# Patient Record
Sex: MALE | Employment: STUDENT | URBAN - METROPOLITAN AREA
[De-identification: names, ages, dates, MRNs, and addresses within clinical notes are randomized per-mention and may not be internally consistent; named-entity substitution may affect disease eponyms.]

---

## 2017-03-16 ENCOUNTER — TELEPHONE (OUTPATIENT)
Dept: INTERNAL MEDICINE CLINIC | Age: 22
End: 2017-03-16

## 2017-03-16 NOTE — TELEPHONE ENCOUNTER
Spoke to patients mother Sharee Ge (On HIPPA). Patient is out of town at [facility]. Patient has c/o chest discomfort \"like when she had pneumonia\". This started yesterday per Mom. She is also having headaches. Patient has an appt scheduled for 3/20/17. Tra Vickers that we recommend ER/UC with c/o chest discomfort/pain. Sharee Ge acknowledged that is the option if patient becomes worse stating the pain is more so in patients back. Advised er/uc for concerns.

## 2017-03-20 ENCOUNTER — OFFICE VISIT (OUTPATIENT)
Dept: INTERNAL MEDICINE CLINIC | Age: 22
End: 2017-03-20

## 2017-03-20 VITALS
OXYGEN SATURATION: 100 % | DIASTOLIC BLOOD PRESSURE: 76 MMHG | BODY MASS INDEX: 37.18 KG/M2 | HEIGHT: 69 IN | RESPIRATION RATE: 18 BRPM | TEMPERATURE: 98.5 F | WEIGHT: 251 LBS | SYSTOLIC BLOOD PRESSURE: 121 MMHG | HEART RATE: 81 BPM

## 2017-03-20 DIAGNOSIS — R05.9 COUGH: ICD-10-CM

## 2017-03-20 DIAGNOSIS — J20.9 ACUTE BRONCHITIS, UNSPECIFIED ORGANISM: Primary | ICD-10-CM

## 2017-03-20 RX ORDER — AZITHROMYCIN 250 MG/1
TABLET, FILM COATED ORAL
Qty: 6 TAB | Refills: 0 | Status: SHIPPED | OUTPATIENT
Start: 2017-03-20 | End: 2017-06-08 | Stop reason: ALTCHOICE

## 2017-03-20 RX ORDER — BENZONATATE 100 MG/1
100 CAPSULE ORAL
Qty: 21 CAP | Refills: 0 | Status: SHIPPED | OUTPATIENT
Start: 2017-03-20 | End: 2017-03-27

## 2017-03-20 NOTE — MR AVS SNAPSHOT
Visit Information Date & Time Provider Department Dept. Phone Encounter #  
 3/20/2017 10:10 AM Natalie Carrero [phone] [id number] Follow-up Instructions Return if symptoms worsen or fail to improve, for Scheduled appointment 12/20/17. Your Appointments 12/20/2017  8:00 AM  
ROUTINE CARE with MD Natalie Carrero [facility]) Appt Note: 1yr f/u; Annual physical exam, fasting labs [address] [address] [phone]  
  
   
 [address] [place] [facility] [address] Upcoming Health Maintenance Date Due  
 PAP AKA CERVICAL CYTOLOGY 2/11/2016 DTaP/Tdap/Td series (2 - Td) 9/19/2016 Allergies as of 3/20/2017  Review Complete On: 3/20/2017 By: Maribel Tristan MD  
 No Known Allergies Current Immunizations  Reviewed on 4/17/2015 Name Date Human Papillomavirus 10/23/2012, 5/29/2012, 3/27/2012 Influenza Vaccine 10/30/2013 Influenza Vaccine PF 1/9/2013 Meningococcal Vaccine 3/27/2012 TDAP Vaccine 9/19/2006 Not reviewed this visit You Were Diagnosed With   
  
 Codes Comments Acute bronchitis, unspecified organism    -  Primary ICD-10-CM: J20.9 ICD-9-CM: 466.0 Cough     ICD-10-CM: R05 ICD-9-CM: 381. 2 Vitals BP Pulse Temp Resp Height(growth percentile) Weight(growth percentile) 121/76 (BP 1 Location: Left arm, BP Patient Position: Sitting) 81 98.5 °F (36.9 °C) (Oral) 18 5' 9\" (1.753 m) 251 lb (113.9 kg) LMP SpO2 BMI OB Status Smoking Status 03/05/2017 100% 37.07 kg/m2 Having regular periods Never Smoker BMI and BSA Data Body Mass Index Body Surface Area  
 37.07 kg/m 2 2.35 m 2 Preferred Pharmacy Pharmacy Name Phone [facility] [address] [place], [place] - [address] [place] [address] Claudio [phone] Your Updated Medication List  
  
   
 This list is accurate as of: 3/20/17 10:45 AM.  Always use your most recent med list.  
  
  
  
  
 azithromycin 250 mg tablet Commonly known as:  Kerriso Aguilar Take two tablets today then one tablet daily  
  
 benzonatate 100 mg capsule Commonly known as:  TESSALON Take 1 Cap by mouth three (3) times daily as needed for Cough for up to 7 days. fexofenadine 180 mg tablet Commonly known as:  Erin Peat Take 1 Tab by mouth daily. Indications: ALLERGIC RHINITIS  
  
 VITAMIN D3 1,000 unit Cap Generic drug:  cholecalciferol Take  by mouth daily. Prescriptions Sent to Pharmacy Refills  
 benzonatate (TESSALON) 100 mg capsule 0 Sig: Take 1 Cap by mouth three (3) times daily as needed for Cough for up to 7 days. Class: Normal  
 Pharmacy: [address] Ph #: [phone] Route: Oral  
 azithromycin (ZITHROMAX Z-KEVIN) 250 mg tablet 0 Sig: Take two tablets today then one tablet daily Class: Normal  
 Pharmacy: [address] Ph #: [phone] Follow-up Instructions Return if symptoms worsen or fail to improve, for Scheduled appointment 12/20/17. To-Do List   
 03/20/2017 Imaging:  XR CHEST PA LAT Patient Instructions Bronchitis: Care Instructions Your Care Instructions Bronchitis is inflammation of the bronchial tubes, which carry air to the lungs. The tubes swell and produce mucus, or phlegm. The mucus and inflamed bronchial tubes make you cough. You may have trouble breathing. Most cases of bronchitis are caused by viruses like those that cause colds. Antibiotics usually do not help and they may be harmful. Bronchitis usually develops rapidly and lasts about 2 to 3 weeks in otherwise healthy people. Follow-up care is a key part of your treatment and safety.  Be sure to make and go to all appointments, and call your doctor if you are having problems. It's also a good idea to know your test results and keep a list of the medicines you take. How can you care for yourself at home? · Take all medicines exactly as prescribed. Call your doctor if you think you are having a problem with your medicine. · Get some extra rest. 
· Take an over-the-counter pain medicine, such as acetaminophen (Tylenol), ibuprofen (Advil, Motrin), or naproxen (Aleve) to reduce fever and relieve body aches. Read and follow all instructions on the label. · Do not take two or more pain medicines at the same time unless the doctor told you to. Many pain medicines have acetaminophen, which is Tylenol. Too much acetaminophen (Tylenol) can be harmful. · Take an over-the-counter cough medicine that contains dextromethorphan to help quiet a dry, hacking cough so that you can sleep. Avoid cough medicines that have more than one active ingredient. Read and follow all instructions on the label. · Breathe moist air from a humidifier, hot shower, or sink filled with hot water. The heat and moisture will thin mucus so you can cough it out. · Do not smoke. Smoking can make bronchitis worse. If you need help quitting, talk to your doctor about stop-smoking programs and medicines. These can increase your chances of quitting for good. When should you call for help? Call 911 anytime you think you may need emergency care. For example, call if: 
· You have severe trouble breathing. Call your doctor now or seek immediate medical care if: 
· You have new or worse trouble breathing. · You cough up dark brown or bloody mucus (sputum). · You have a new or higher fever. · You have a new rash. Watch closely for changes in your health, and be sure to contact your doctor if: 
· You cough more deeply or more often, especially if you notice more mucus or a change in the color of your mucus. · You are not getting better as expected. Where can you learn more? Go to http://william-kory.info/. Enter H333 in the search box to learn more about \"Bronchitis: Care Instructions. \" Current as of: May 23, 2016 Content Version: 11.1 © 8976-3734 Healthwise, Incorporated. Care instructions adapted under license by Aug[employer] (which disclaims liability or warranty for this information). If you have questions about a medical condition or this instruction, always ask your healthcare professional. [address] any warranty or liability for your use of this information. Introducing [facility] & HEALTH SERVICES! Dear Billy Lemus: Thank you for requesting a Squareknot account. Our records indicate that you already have an active Squareknot account. You can access your account anytime at [email or web address] Did you know that you can access your hospital and ER discharge instructions at any time in Squareknot? You can also review all of your test results from your hospital stay or ER visit. Additional Information If you have questions, please visit the Frequently Asked Questions section of the Squareknot website at [email or web address] Remember, Squareknot is NOT to be used for urgent needs. For medical emergencies, dial 911. Now available from your iPhone and Android! Please provide this summary of care documentation to your next provider. Your primary care clinician is listed as Phan Boland. If you have any questions after today's visit, please call [phone].

## 2017-03-20 NOTE — PATIENT INSTRUCTIONS
Bronchitis: Care Instructions  Your Care Instructions    Bronchitis is inflammation of the bronchial tubes, which carry air to the lungs. The tubes swell and produce mucus, or phlegm. The mucus and inflamed bronchial tubes make you cough. You may have trouble breathing. Most cases of bronchitis are caused by viruses like those that cause colds. Antibiotics usually do not help and they may be harmful. Bronchitis usually develops rapidly and lasts about 2 to 3 weeks in otherwise healthy people. Follow-up care is a key part of your treatment and safety. Be sure to make and go to all appointments, and call your doctor if you are having problems. It's also a good idea to know your test results and keep a list of the medicines you take. How can you care for yourself at home? · Take all medicines exactly as prescribed. Call your doctor if you think you are having a problem with your medicine. · Get some extra rest.  · Take an over-the-counter pain medicine, such as acetaminophen (Tylenol), ibuprofen (Advil, Motrin), or naproxen (Aleve) to reduce fever and relieve body aches. Read and follow all instructions on the label. · Do not take two or more pain medicines at the same time unless the doctor told you to. Many pain medicines have acetaminophen, which is Tylenol. Too much acetaminophen (Tylenol) can be harmful. · Take an over-the-counter cough medicine that contains dextromethorphan to help quiet a dry, hacking cough so that you can sleep. Avoid cough medicines that have more than one active ingredient. Read and follow all instructions on the label. · Breathe moist air from a humidifier, hot shower, or sink filled with hot water. The heat and moisture will thin mucus so you can cough it out. · Do not smoke. Smoking can make bronchitis worse. If you need help quitting, talk to your doctor about stop-smoking programs and medicines. These can increase your chances of quitting for good.   When should you call for help? Call 911 anytime you think you may need emergency care. For example, call if:  · You have severe trouble breathing. Call your doctor now or seek immediate medical care if:  · You have new or worse trouble breathing. · You cough up dark brown or bloody mucus (sputum). · You have a new or higher fever. · You have a new rash. Watch closely for changes in your health, and be sure to contact your doctor if:  · You cough more deeply or more often, especially if you notice more mucus or a change in the color of your mucus. · You are not getting better as expected. Where can you learn more? Go to http://william-kory.info/. Enter H333 in the search box to learn more about \"Bronchitis: Care Instructions. \"  Current as of: May 23, 2016  Content Version: 11.1  © 1889-5650 Applied BioCode, Incorporated. Care instructions adapted under license by Photonic Materials (which disclaims liability or warranty for this information). If you have questions about a medical condition or this instruction, always ask your healthcare professional. [address] any warranty or liability for your use of this information.

## 2017-03-20 NOTE — LETTER
NOTIFICATION RETURN TO WORK / SCHOOL 
 
3/20/2017 10:47 AM 
 
Ms. Jasiel [address] 89 
[id number] [employer] To Whom It May Concern: 
 
Jasiel Orosco is currently under the care of [facility]. She missed class on 3/16/17 related to illness. If there are questions or concerns please have the patient contact our office. Sincerely, Turner Dial MD

## 2017-03-20 NOTE — PROGRESS NOTES
CC:  Chief Complaint   Patient presents with    Other     discomfort in chest and back/cough    Headache     HISTORY OF PRESENT ILLNESS  Claribel Hazel is a 25 y.o. female. She complains of 2 week history of fatigue, headaches, non-productive cough worse at night time, sore throat, and mild dyspnea when moving. Then 4 days ago, had back and chest pain that felt similar to when she had pneumonia last year. Denies fevers, chills, hoarseness, sinus congestion, runny nose, sneezing, ear pains, wheezing, hemoptysis, chest pain, myalgias, abdominal pain, and diarrhea. Treatment to date: ibuprofen. Patient reports no ill contacts. She did not receive a flu vaccine. Soc Hx  Single. No children. 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 Never smoker. Is not sexually active. Homosexual.     Patient Active Problem List   Diagnosis Code    Obesity (BMI 35.0-39.9 without comorbidity) (RUSTca 75.) E66.9    Mild depression F32.0    Raynaud's phenomenon I73.00    Allergic rhinitis J30.9     Past Medical History:   Diagnosis Date    Migraines 2007    Mild depression 3/10/2015    Obesity 2007    RAD (reactive airway disease) 1702,3297    Rhinitis 2005    Allergic & Seasonal     No Known Allergies  Current Outpatient Prescriptions   Medication Sig Dispense Refill    fexofenadine (ALLEGRA) 180 mg tablet Take 1 Tab by mouth daily. Indications: ALLERGIC RHINITIS 30 Tab 3    Cholecalciferol, Vitamin D3, (VITAMIN D3) 1,000 unit Cap Take  by mouth daily. PHYSICAL EXAM  Visit Vitals    /76 (BP 1 Location: Left arm, BP Patient Position: Sitting)    Pulse 81    Temp 98.5 °F (36.9 °C) (Oral)    Resp 18    Ht 5' 9\" (1.753 m)    Wt 251 lb (113.9 kg)    LMP 03/05/2017    SpO2 100%    BMI 37.07 kg/m2       General: Obese, no distress. HEENT:  Head normocephalic/atraumatic, no scleral icterus or conjunctival injection.  Oropharynx benign. No sinus tenderness. TM's and ear canals normal bilaterally. Neck: Supple. No lymphadenopathy. Lungs:  Clear to ausculation bilaterally. Good air movement. Heart:  Regular rate and rhythm, normal S1 and S2, no murmur, gallop, or rub  Extremities: No clubbing, cyanosis, or edema. Neurological: Alert and oriented. Psychiatric: Normal mood and affect. Behavior is normal.       ASSESSMENT AND PLAN    ICD-10-CM ICD-9-CM    1. Acute bronchitis, unspecified organism J20.9 466.0 XR CHEST PA LAT      benzonatate (TESSALON) 100 mg capsule      azithromycin (ZITHROMAX Z-KEVIN) 250 mg tablet   2. Cough R05 786.2        Darius Moore was seen today for other and headache. Diagnoses and all orders for this visit:    Acute bronchitis, unspecified organism  -     XR CHEST PA LAT; Future  -     benzonatate (TESSALON) 100 mg capsule; Take 1 Cap by mouth three (3) times daily as needed for Cough for up to 7 days. -     azithromycin (ZITHROMAX Z-KEVIN) 250 mg tablet; Take two tablets today then one tablet daily    Cough    Follow-up Disposition:  Return if symptoms worsen or fail to improve, for Scheduled appointment 12/20/17. Provided patient and/or family with advanced directive information and answered pertinent questions. Encouraged patient to provide a copy of advanced directive to the office when available. I have discussed the diagnosis with the patient and the intended plan as seen in the above orders. Patient is in agreement. The patient has received an after-visit summary and questions were answered concerning future plans. I have discussed medication side effects and warnings with the patient as well.

## 2017-03-20 NOTE — PROGRESS NOTES
Reviewed record  In preparation for visit and have obtained necessary documentation. 1. Have you been to the ER, urgent care clinic since your last visit? Hospitalized since your last visit?no  2. Have you seen or consulted any other health care providers outside of the [address] [place] [address] since your last visit? Include any pap smears or colon screening. No  Patient declines information on advanced directives. Declines pap smear. Wants to wait on Tdap.

## 2017-04-04 ENCOUNTER — TELEPHONE (OUTPATIENT)
Dept: INTERNAL MEDICINE CLINIC | Age: 22
End: 2017-04-04

## 2017-04-04 DIAGNOSIS — J30.2 SEASONAL ALLERGIC RHINITIS, UNSPECIFIED ALLERGIC RHINITIS TRIGGER: Primary | ICD-10-CM

## 2017-04-04 RX ORDER — MONTELUKAST SODIUM 10 MG/1
10 TABLET ORAL DAILY
Qty: 30 TAB | Refills: 5 | Status: SHIPPED | OUTPATIENT
Start: 2017-04-04 | End: 2017-06-08 | Stop reason: ALTCHOICE

## 2017-04-04 NOTE — TELEPHONE ENCOUNTER
Pt takes Allegra every day. Pollen is high and she has been getting headaches and becoming really stuffy. Mom wants to know if she needs to come in here to get something additional to go with her Allegra or does she need to see an allergy specialist.  Pt's mom can be reached at [phone].

## 2017-04-04 NOTE — TELEPHONE ENCOUNTER
Patients mother reports patient does not do well with nasal sprays. Patient is having HA and pressure in face/nose. Patient does not feel sick just having issues with allergies. Patients mother would like something else for patient to take to help with her seasonal allergies.

## 2017-05-11 ENCOUNTER — TELEPHONE (OUTPATIENT)
Dept: INTERNAL MEDICINE CLINIC | Age: 22
End: 2017-05-11

## 2017-05-11 NOTE — TELEPHONE ENCOUNTER
Mother reports patient is c/o bad dreams and itching after using generic Singulair. Patient is having a lot of issues with allergies (seasonal) and would like alternative to Singulair. Please advise.

## 2017-05-11 NOTE — TELEPHONE ENCOUNTER
----- Message from Yumiko Cuevass sent at 5/11/2017 12:58 PM EDT -----  Regarding: Dr. Racheal Gardner  Pt called regarding another medication that she can take with her Rx Allegra the previous medication she is having side effects with that.  Pt best contact number [phone]

## 2017-05-12 NOTE — TELEPHONE ENCOUNTER
Inform patient or patient's mother that there is no substitute for Singulair that would not cause the same reaction. If Allegra alone is not helping allergies, Dr. Scot Ballesteros recommends Miss Coy Toney add a nasal spray like Flonase nasal spray. It is now sold over the counter, so she can get this from any pharmacy. The Emeka-Sinus nasal rinse, also called the [facility], is also very helpful for helping seasonal allergies.

## 2017-06-08 ENCOUNTER — OFFICE VISIT (OUTPATIENT)
Dept: INTERNAL MEDICINE CLINIC | Age: 22
End: 2017-06-08

## 2017-06-08 VITALS
WEIGHT: 252 LBS | OXYGEN SATURATION: 97 % | TEMPERATURE: 99 F | DIASTOLIC BLOOD PRESSURE: 77 MMHG | RESPIRATION RATE: 16 BRPM | SYSTOLIC BLOOD PRESSURE: 115 MMHG | HEART RATE: 88 BPM | BODY MASS INDEX: 37.33 KG/M2 | HEIGHT: 69 IN

## 2017-06-08 DIAGNOSIS — M65.9 SYNOVITIS OF FINGER: Primary | ICD-10-CM

## 2017-06-08 DIAGNOSIS — L30.9 ECZEMA, UNSPECIFIED TYPE: ICD-10-CM

## 2017-06-08 RX ORDER — IBUPROFEN 800 MG/1
800 TABLET ORAL
Qty: 15 TAB | Refills: 0 | Status: SHIPPED | OUTPATIENT
Start: 2017-06-08 | End: 2017-06-13

## 2017-06-08 NOTE — LETTER
6/19/2017 11:37 AM 
 
Ms. Merline Plumb [address] 
[id number] [employer] Dear Merline Plumb: 
 
Please find your most recent results below. Resulted Orders RA + CCP ABS Result Value Ref Range Rheumatoid factor <10.0 0.0 - 13.9 IU/mL  
 CCP Antibodies IgG/IgA 5 0 - 19 units Comment:  
                             Negative               <20 Weak positive      20 - 39 Moderate positive  40 - 59 Strong positive        >59 Narrative Performed at:  [address]  [id number] : Karen Riley MD, Phone:  [phone] RECOMMENDATIONS: 
Your blood tests for rheumatoid arthritis (RA) returned negative, meaning you do not have RA. Please call me if you have any questions: [phone] Sincerely, Chemo Bradford LPN

## 2017-06-08 NOTE — MR AVS SNAPSHOT
Visit Information Date & Time Provider Department Dept. Phone Encounter #  
 6/8/2017  9:10 AM MD Tim Oconnor [phone] [id number] Follow-up Instructions Return if symptoms worsen or fail to improve, for Scheduled appt on 12/20/17. Your Appointments 12/20/2017  8:00 AM  
ROUTINE CARE with MD Tim Oconnor [facility] Appt Note: 1yr f/u; Annual physical exam, fasting labs [address] [place] [address] [phone]  
  
   
 8 [facility] [address] Upcoming Health Maintenance Date Due  
 PAP AKA CERVICAL CYTOLOGY 2/11/2016 DTaP/Tdap/Td series (2 - Td) 9/19/2016 INFLUENZA AGE 9 TO ADULT 8/1/2017 Allergies as of 6/8/2017  Review Complete On: 6/8/2017 By: Clive Guzmán MD  
 No Known Allergies Current Immunizations  Reviewed on 4/17/2015 Name Date Human Papillomavirus 10/23/2012, 5/29/2012, 3/27/2012 Influenza Vaccine 10/30/2013 Influenza Vaccine PF 1/9/2013 Meningococcal Vaccine 3/27/2012 TDAP Vaccine 9/19/2006 Not reviewed this visit You Were Diagnosed With   
  
 Codes Comments Synovitis of finger    -  Primary ICD-10-CM: M65.9 ICD-9-CM: 727.05 Eczema, unspecified type     ICD-10-CM: L30.9 ICD-9-CM: 692.9 Vitals BP Pulse Temp Resp Height(growth percentile) Weight(growth percentile) 115/77 (BP 1 Location: Left arm, BP Patient Position: Sitting) 88 99 °F (37.2 °C) (Oral) 16 5' 9\" (1.753 m) 252 lb (114.3 kg) LMP SpO2 BMI OB Status Smoking Status 06/01/2017 97% 37.21 kg/m2 Having regular periods Never Smoker BMI and BSA Data Body Mass Index Body Surface Area  
 37.21 kg/m 2 2.36 m 2 Preferred Pharmacy Pharmacy Name Phone [facility] [address] [place], [place] - [address] [place] [address] [phone] Your Updated Medication List  
  
   
 This list is accurate as of: 6/8/17  9:17 AM.  Always use your most recent med list.  
  
  
  
  
 fexofenadine 180 mg tablet Commonly known as:  Conner [place] Take 1 Tab by mouth daily. Indications: ALLERGIC RHINITIS  
  
 ibuprofen 800 mg tablet Commonly known as:  MOTRIN Take 1 Tab by mouth every eight (8) hours as needed for Pain for up to 5 days. Take with food. VITAMIN D3 1,000 unit Cap Generic drug:  cholecalciferol Take  by mouth daily. Prescriptions Sent to Pharmacy Refills  
 ibuprofen (MOTRIN) 800 mg tablet 0 Sig: Take 1 Tab by mouth every eight (8) hours as needed for Pain for up to 5 days. Take with food. Class: Normal  
 Pharmacy: [address], [address] [place] [address] Ph #: [phone] Route: Oral  
  
We Performed the Following RA + CCP ABS [[id number] Custom] Follow-up Instructions Return if symptoms worsen or fail to improve, for Scheduled appt on 12/20/17. Introducing [facility] & HEALTH SERVICES! Dear Jimena Purchase: Thank you for requesting a Independent IP account. Our records indicate that you already have an active Independent IP account. You can access your account anytime at [email or web address] Did you know that you can access your hospital and ER discharge instructions at any time in Independent IP? You can also review all of your test results from your hospital stay or ER visit. Additional Information If you have questions, please visit the Frequently Asked Questions section of the Independent IP website at [email or web address] Remember, Independent IP is NOT to be used for urgent needs. For medical emergencies, dial 911. Now available from your iPhone and Android! Please provide this summary of care documentation to your next provider. Your primary care clinician is listed as Justin Mcnamara. If you have any questions after today's visit, please call [phone].

## 2017-06-08 NOTE — PROGRESS NOTES
CC:  Chief Complaint   Patient presents with    Other     bumps on fingers/right hand     [address] K[address] Ventura is a 25 y.o. female    She complains of bumps on her right middle finger for over a year; no itching or pain. Her mother has similar bumps. Two days ago, developed swelling and tenderness at joint of middle finger. Noticed in evening. Mild stiffness. Had never happened before. Complains of occasional left middle back pain when she takes in a deep breath; this is the area where she had pneumonia. She has mild depression, allergic rhinitis, Raynaud's phenomenon, history of pneumonia in 9/16, and obesity. Denies fevers, chills, dyspnea, CP, or arthralgias at other joints.     Soc Hx  Single. No children. Attends [employer] where she is a senior; has one more class to take in the fall before graduating. Never smoker. Is not sexually active.      Patient Active Problem List   Diagnosis Code    Obesity (BMI 35.0-39.9 without comorbidity) ([facility] E66.9    Mild depression ([facility] F32.0    Raynaud's phenomenon I73.00    Allergic rhinitis J30.9     Past Medical History:   Diagnosis Date    Migraines 2007    Mild depression ([facility] 3/10/2015    Obesity 2007    RAD (reactive airway disease) 4764,2729    Rhinitis 2005    Allergic & Seasonal     No Known Allergies  Current Outpatient Prescriptions   Medication Sig Dispense Refill    fexofenadine (ALLEGRA) 180 mg tablet Take 1 Tab by mouth daily. Indications: ALLERGIC RHINITIS 30 Tab 3    Cholecalciferol, Vitamin D3, (VITAMIN D3) 1,000 unit Cap Take  by mouth daily. PHYSICAL EXAM  Visit Vitals    /77 (BP 1 Location: Left arm, BP Patient Position: Sitting)    Pulse 88    Temp 99 °F (37.2 °C) (Oral)    Resp 16    Ht 5' 9\" (1.753 m)    Wt 252 lb (114.3 kg)    LMP 06/01/2017    SpO2 97%    BMI 37.21 kg/m2       General: Obese, no distress.   HEENT:  Head normocephalic/atraumatic, no scleral icterus  Lungs:  Clear to ausculation bilaterally. Good air movement. Heart:  Regular rate and rhythm, normal S1 and S2, no murmur, gallop, or rub  Extremities: No clubbing, cyanosis. Erythema with tenderness and no warmth at PIP joint of right 3rd finger. Two 2 mm flesh-colored papules on same finger. Neurological: Alert and oriented. Psychiatric: Normal mood and affect. Behavior is normal.       ASSESSMENT AND PLAN    ICD-10-CM ICD-9-CM    1. Synovitis of finger M65.9 727.05 ibuprofen (MOTRIN) 800 mg tablet      RA + CCP ABS   2. Eczema, unspecified type L30.9 692.9        Yancy Menjivar was seen today for other. Plan CXR if back pain persists. Diagnoses and all orders for this visit:    Synovitis of finger  -     Start ibuprofen (MOTRIN) 800 mg tablet; Take 1 Tab by mouth every eight (8) hours as needed for Pain for up to 5 days. Take with food. -     RA + CCP ABS    Eczema, unspecified type  Mild dyshidrotic eczema at right 3rd finger. Since it is causing no symptoms, will monitor for now. Follow-up Disposition:  Return if symptoms worsen or fail to improve, for Scheduled appt on 12/20/17. Provided patient and/or family with advanced directive information and answered pertinent questions. Encouraged patient to provide a copy of advanced directive to the office when available. I have discussed the diagnosis with the patient and the intended plan as seen in the above orders. Patient is in agreement. The patient has received an after-visit summary and questions were answered concerning future plans. I have discussed medication side effects and warnings with the patient as well.

## 2017-06-08 NOTE — PROGRESS NOTES
Reviewed record  In preparation for visit and have obtained necessary documentation. 1. Have you been to the ER, urgent care clinic since your last visit? Hospitalized since your last visit?no  2. Have you seen or consulted any other health care providers outside of the [facility] since your last visit? Include any pap smears or colon screening. no  Declines tdap today. Patient has been given information on advanced directives at a previous visit. Pap req reprinted.

## 2017-06-09 LAB
CCP IGA+IGG SERPL IA-ACNC: 5 UNITS (ref 0–19)
RHEUMATOID FACT SERPL-ACNC: <10 IU/ML (ref 0–13.9)

## 2017-06-19 ENCOUNTER — TELEPHONE (OUTPATIENT)
Dept: INTERNAL MEDICINE CLINIC | Age: 22
End: 2017-06-19

## 2017-06-19 DIAGNOSIS — M79.89 SWELLING OF RIGHT HAND: Primary | ICD-10-CM

## 2017-07-05 ENCOUNTER — OFFICE VISIT (OUTPATIENT)
Dept: INTERNAL MEDICINE CLINIC | Age: 22
End: 2017-07-05

## 2017-07-05 VITALS
RESPIRATION RATE: 18 BRPM | OXYGEN SATURATION: 98 % | HEART RATE: 98 BPM | HEIGHT: 69 IN | BODY MASS INDEX: 37.47 KG/M2 | DIASTOLIC BLOOD PRESSURE: 80 MMHG | WEIGHT: 253 LBS | SYSTOLIC BLOOD PRESSURE: 107 MMHG | TEMPERATURE: 98.7 F

## 2017-07-05 DIAGNOSIS — R20.0 LEFT ARM NUMBNESS: ICD-10-CM

## 2017-07-05 DIAGNOSIS — M89.8X1 SHOULDER BLADE PAIN: Primary | ICD-10-CM

## 2017-07-05 RX ORDER — CYCLOBENZAPRINE HCL 5 MG
5 TABLET ORAL
Qty: 21 TAB | Refills: 0 | Status: SHIPPED | OUTPATIENT
Start: 2017-07-05 | End: 2017-09-20 | Stop reason: ALTCHOICE

## 2017-07-05 RX ORDER — NAPROXEN 500 MG/1
500 TABLET ORAL 2 TIMES DAILY WITH MEALS
Qty: 20 TAB | Refills: 0 | Status: SHIPPED | OUTPATIENT
Start: 2017-07-05 | End: 2017-09-20 | Stop reason: ALTCHOICE

## 2017-07-05 NOTE — PROGRESS NOTES
Reviewed record  In preparation for visit and have obtained necessary documentation. 1. Have you been to the ER, urgent care clinic since your last visit? Hospitalized since your last visit?no  2. Have you seen or consulted any other health care providers outside of the [address] [place] [address] since your last visit? Include any pap smears or colon screening. No  Patient declines information on advanced directives. Has referral for pap and declines tdap.

## 2017-07-05 NOTE — PROGRESS NOTES
CC:   Chief Complaint   Patient presents with    Shoulder Pain     left shoulder follow up       [address] Kirt Ventura is a 25 y.o. female. She complains of left shoulder pain on and off in the winter; became more regular and intense in June. Pain usually ranges from 2-7/10, located at left shoulder blade; throbbing, sometimes radiates to chest, occasionally associated with numbness down left upper arm. Lifted suitcase recently that worsened pain. Worse when she sits up straight or makes sudden movements. Better with laying down or reclining. No relief with OTC ibuprofen. Denies trauma or injury at onset of symptoms. Hand bumps and fist pain, for which she was seen previously and called about, resolved. Soc Hx  Single. No children. Attends [employer] where she is a senior; has one more class to take in the fall 2017 before graduating, major is history, hopes to work in [employer] in the future. Never smoker. Is not sexually active.  Homosexual.    ROS  Constitutional: negative for fevers, chills, night sweats  ENT:   negative for sore throat, nasal congestion, ear pains, hoarseness  Respiratory:  negative for cough, hemoptysis, dyspnea,wheezing  CV:   negative for chest pain, palpitations, lower extremity edema  GI:   negative for heartburn, abd pain, nausea, vomiting, diarrhea, constipation  Genitourinary: negative for frequency, dysuria and hematuria  Integument:  negative for rash and pruritus  Musculoskel: negative for myalgias, arthralgias, back pain, muscle weakness, joint pain  Neurological:  negative for headaches, dizziness, vertigo, gait problems  Behavl/Psych: negative for feelings of anxiety, depression, mood change     Patient Active Problem List   Diagnosis Code    Obesity (BMI 35.0-39.9 without comorbidity) ([facility]) E66.9    Mild depression ([facility] F32.0    Raynaud's phenomenon I73.00    Allergic rhinitis J30.9     Past Medical History:   Diagnosis Date    Migraines 2007    [facility]    Obesity 2007    RAD (reactive airway disease) 4732,4466    Rhinitis 2005    Allergic & Seasonal     No Known Allergies  Current Outpatient Prescriptions   Medication Sig Dispense Refill    fexofenadine (ALLEGRA) 180 mg tablet Take 1 Tab by mouth daily. Indications: ALLERGIC RHINITIS 30 Tab 3    Cholecalciferol, Vitamin D3, (VITAMIN D3) 1,000 unit Cap Take  by mouth daily. PHYSICAL EXAM  Visit Vitals    /80 (BP 1 Location: Left arm, BP Patient Position: Sitting)    Pulse 98    Temp 98.7 °F (37.1 °C) (Oral)    Resp 18    Ht 5' 9\" (1.753 m)    Wt 253 lb (114.8 kg)    LMP 06/01/2017    SpO2 98%    BMI 37.36 kg/m2       General: Obese, no distress. HEENT:  Head normocephalic/atraumatic, no scleral icterus  Lungs:  Clear to ausculation bilaterally. Good air movement. Heart:  Regular rate and rhythm, normal S1 and S2, no murmur, gallop, or rub  Extremities: No clubbing, cyanosis, or edema. Tenderness along lower portion of left shoulder blade. Neurological: Alert and oriented. Psychiatric: Normal mood and affect. Behavior is normal.     Results for orders placed or performed in visit on 06/08/17   RA + CCP ABS   Result Value Ref Range    Rheumatoid factor <10.0 0.0 - 13.9 IU/mL    CCP Antibodies IgG/IgA 5 0 - 19 units         ASSESSMENT AND PLAN    ICD-10-CM ICD-9-CM    1. Shoulder blade pain M89.8X1 733.90 naproxen (NAPROSYN) 500 mg tablet      cyclobenzaprine (FLEXERIL) 5 mg tablet   2. Left arm numbness R20.0 782.0        [facility] pain. Diagnoses and all orders for this visit:    Shoulder blade pain  Most likely musculoskeletal.  -     Start naproxen (NAPROSYN) 500 mg tablet; Take 1 Tab by mouth two (2) times daily (with meals). -     Start cyclobenzaprine (FLEXERIL) 5 mg tablet; Take 1 Tab by mouth three (3) times daily as needed for Muscle Spasm(s). Muscle relaxer.     Left arm numbness      Follow-up Disposition:  Return if symptoms worsen or fail to improve, for Scheduled appt 12/20/17. Provided patient and/or family with advanced directive information and answered pertinent questions. Encouraged patient to provide a copy of advanced directive to the office when available. I have discussed the diagnosis with the patient and the intended plan as seen in the above orders. Patient is in agreement. The patient has received an after-visit summary and questions were answered concerning future plans. I have discussed medication side effects and warnings with the patient as well.

## 2017-07-05 NOTE — MR AVS SNAPSHOT
Visit Information Date & Time Provider Department Dept. Phone Encounter #  
 7/5/2017  9:30 AM Riana Fry, [facility] [phone] [id number] Follow-up Instructions Return if symptoms worsen or fail to improve, for Scheduled appt 12/20/17. Your Appointments 12/20/2017  8:00 AM  
ROUTINE CARE with Riana Fry MD  
[address] [facility]) Appt Note: 1yr f/u; Annual physical exam, fasting labs [address] [address] [phone]  
  
   
 [address] [facility] [address] Upcoming Health Maintenance Date Due  
 PAP AKA CERVICAL CYTOLOGY 2/11/2016 INFLUENZA AGE 9 TO ADULT 8/1/2017 DTaP/Tdap/Td series (3 - Td) 7/5/2027 Allergies as of 7/5/2017  Review Complete On: 7/5/2017 By: Riana Fry MD  
 No Known Allergies Current Immunizations  Reviewed on 4/17/2015 Name Date Human Papillomavirus 10/23/2012, 5/29/2012, 3/27/2012 Influenza Vaccine 10/30/2013 Influenza Vaccine PF 1/9/2013 Meningococcal Vaccine 3/27/2012 TDAP Vaccine 9/19/2006 Not reviewed this visit You Were Diagnosed With   
  
 Codes Comments Shoulder blade pain    -  Primary ICD-10-CM: H54.0I3 ICD-9-CM: 733.90 Left arm numbness     ICD-10-CM: R20.0 ICD-9-CM: 316. 0 Vitals BP Pulse Temp Resp Height(growth percentile) Weight(growth percentile) 107/80 (BP 1 Location: Left arm, BP Patient Position: Sitting) 98 98.7 °F (37.1 °C) (Oral) 18 5' 9\" (1.753 m) 253 lb (114.8 kg) LMP SpO2 BMI OB Status Smoking Status 06/01/2017 98% 37.36 kg/m2 Having regular periods Never Smoker Vitals History BMI and BSA Data Body Mass Index Body Surface Area  
 37.36 kg/m 2 2.36 m 2 Preferred Pharmacy Pharmacy Name Phone [facility] [address] [place], [place] - [address] [place] [address] [phone] Your Updated Medication List  
  
   
 This list is accurate as of: 7/5/17  9:56 AM.  Always use your most recent med list.  
  
  
  
  
 cyclobenzaprine 5 mg tablet Commonly known as:  FLEXERIL Take 1 Tab by mouth three (3) times daily as needed for Muscle Spasm(s). Muscle relaxer. fexofenadine 180 mg tablet Commonly known as:  Bertis Knights Take 1 Tab by mouth daily. Indications: ALLERGIC RHINITIS  
  
 naproxen 500 mg tablet Commonly known as:  NAPROSYN Take 1 Tab by mouth two (2) times daily (with meals). VITAMIN D3 1,000 unit Cap Generic drug:  cholecalciferol Take  by mouth daily. Prescriptions Sent to Pharmacy Refills  
 naproxen (NAPROSYN) 500 mg tablet 0 Sig: Take 1 Tab by mouth two (2) times daily (with meals). Class: Normal  
 Pharmacy: [address] [place] [address] Ph #: [phone] Route: Oral  
 cyclobenzaprine (FLEXERIL) 5 mg tablet 0 Sig: Take 1 Tab by mouth three (3) times daily as needed for Muscle Spasm(s). Muscle relaxer. Class: Normal  
 Pharmacy: [id number] [place] [address] Ph #: [phone] Route: Oral  
  
Follow-up Instructions Return if symptoms worsen or fail to improve, for Scheduled appt 12/20/17. Patient Instructions Shoulder Blade: Exercises Your Care Instructions Here are some examples of typical exercises for your condition. Start each exercise slowly. Ease off the exercise if you start to have pain. Your doctor or physical therapist will tell you when you can start these exercises and which ones will work best for you. How to do the exercises Shoulder roll 1. Stand tall with your chin slightly tucked. Imagine that a string at the top of your head is pulling you straight up. 2. Keep your arms relaxed. All motion will be in your shoulders. 3. Shrug your shoulders up toward your ears, then up and back.  [place] your shoulders down and back, like you're sliding your hands down into your back pants pockets. 4. Repeat the circles at least 2 to 4 times. This exercise is also helpful anytime you want to relax. Lower neck and upper back stretch 1. With your arms about shoulder height, clasp your hands in front of you. 2. Drop your chin toward your chest. 
3. Reach straight forward so you are rounding your upper back. Think about pulling your shoulder blades apart. Janny Nur feel a stretch across your upper back and shoulders. Hold for at least 6 seconds. 4. Repeat 2 to 4 times. Triceps stretch 1. Reach your arm straight up. 2. Keeping your elbow in place, bend your arm and reach your hand down behind your back. 3. With your other hand, apply gentle pressure to the bent elbow. Janny Nur feel a stretch at the back of your upper arm and shoulder. Hold about 6 seconds. 4. Repeat 2 to 4 times with each arm. Shoulder stretch 1. Relax your shoulders. 2. Raise one arm to shoulder height, and reach it across your chest. 
3. Pull the arm slightly toward you with your other arm. This will help you get a gentle stretch. Hold for about 6 seconds. 4. Repeat 2 to 4 times. Shoulder blade squeeze 1. Sit or stand up tall with your arms at your sides. 2. Keep your shoulders relaxed and down, not shrugged. 3. Squeeze your shoulder blades together. Hold for 6 seconds, then relax. 4. Repeat 8 to 12 times. Straight-arm shoulder blade squeeze 1. Sit or stand tall. Relax your shoulders. 2. With palms down, hold your elastic tubing or band straight out in front of you. 3. Start with slight tension in the tubing or band, with your hands about shoulder-width apart. 4. Slowly pull straight out to the sides, squeezing your shoulder blades together. Keep your arms straight and at shoulder height. Slowly release. 5. Repeat 8 to 12 times. Rowing 1. Switzer your elastic tubing or band at about waist height. Take one end in each hand. 2. Sit or stand with your feet hip-width apart. 3. Hold your arms straight in front of you. Adjust your distance to create slight tension in the tubing or band. 4. Slightly tuck your chin. Relax your shoulders. 5. Without shrugging your shoulders, pull straight back. Your elbows will pass alongside your waist. 
Pull-downs 1. Applegate your elastic tubing or band in the top of a closed door. Take one end in each hand. 2. Either sit or stand, depending on what is more comfortable. If you feel unsteady, sit on a chair. 3. Start with your arms up and comfortably apart, elbows straight. There should be a slight tension in the tubing or band. 4. Slightly tuck your chin, and look straight ahead. 5. Keeping your back straight, slowly pull down and back, bending your elbows. 6. Stop where your hands are level with your chin, in a \"goalpost\" position. 7. Repeat 8 to 12 times. Chest T stretch 1. Lie on your back. Raise your knees so they are bent. Plant your feet on the floor, hip-width apart. 2. Tuck your chin, and relax your shoulders. 3. Reach your arms straight out to the sides. If you don't feel a mild stretch in your shoulders and across your chest, use a foam roll or a tightly rolled blanket under your spine, from your tailbone to your head. 4. Relax in this position for at least 15 to 30 seconds while you breathe normally. Repeat 2 to 4 times. As you get used to this stretch, keep adding a little more time until you are able relax in this position for 2 or 3 minutes. When you can relax for at least 2 minutes, you only need to do the exercise 1 time per session. Chest goalpost stretch 1. Lie on your back. Raise your knees so they are bent. Plant your feet on the floor, hip-width apart. 2. Tuck your chin, and relax your shoulders. 3. Reach your arms straight out to the sides.  
4. Bend your arms at the elbows, with your hands pointed toward the top of your head. Your arms should make an L on either side of your head. Your palms should be facing up. 5. If you don't feel a mild stretch in your shoulders and across your chest, use a foam roll or tightly rolled blanket under your spine, from your tailbone to your head. 6. Relax in this position for at least 15 to 30 seconds while you breathe normally. Repeat 2 to 4 times. Each day you do this exercise, add a little more time until you can relax in this position for 2 or 3 minutes. When you can relax for at least 2 minutes, you only need to do the exercise 1 time per session. Follow-up care is a key part of your treatment and safety. Be sure to make and go to all appointments, and call your doctor if you are having problems. It's also a good idea to know your test results and keep a list of the medicines you take. Where can you learn more? Go to http://william-kory.info/. Enter ([phone] in the search box to learn more about \"Shoulder Blade: Exercises. \" Current as of: March 21, 2017 Content Version: 11.3 © 2675-9871 [employer]. Care instructions adapted under license by [employer] (which disclaims liability or warranty for this information). If you have questions about a medical condition or this instruction, always ask your healthcare professional. [address] any warranty or liability for your use of this information. Introducing [facility] & HEALTH SERVICES! Dear Marylin Cunningham: Thank you for requesting a [employer] account. Our records indicate that you already have an active [employer] account. You can access your account anytime at [email or web address] Did you know that you can access your hospital and ER discharge instructions at any time in [employer]? You can also review all of your test results from your hospital stay or ER visit. Additional Information If you have questions, please visit the Frequently Asked Questions section of the [employer] website at [email or web address] Remember, [employer] is NOT to be used for urgent needs. For medical emergencies, dial 911. Now available from your iPhone and Android! Please provide this summary of care documentation to your next provider. Your primary care clinician is listed as Rock Cowden. If you have any questions after today's visit, please call [phone].

## 2017-09-08 ENCOUNTER — APPOINTMENT (OUTPATIENT)
Dept: GENERAL RADIOLOGY | Age: 22
End: 2017-09-08
Attending: EMERGENCY MEDICINE
Payer: COMMERCIAL

## 2017-09-08 ENCOUNTER — APPOINTMENT (OUTPATIENT)
Dept: CT IMAGING | Age: 22
End: 2017-09-08
Attending: EMERGENCY MEDICINE
Payer: COMMERCIAL

## 2017-09-08 ENCOUNTER — HOSPITAL ENCOUNTER (EMERGENCY)
Age: 22
Discharge: HOME | End: 2017-09-08
Attending: EMERGENCY MEDICINE
Payer: COMMERCIAL

## 2017-09-08 VITALS
WEIGHT: 265.65 LBS | OXYGEN SATURATION: 99 % | HEIGHT: 69 IN | SYSTOLIC BLOOD PRESSURE: 137 MMHG | HEART RATE: 88 BPM | BODY MASS INDEX: 39.35 KG/M2 | RESPIRATION RATE: 17 BRPM | DIASTOLIC BLOOD PRESSURE: 75 MMHG | TEMPERATURE: 98.3 F

## 2017-09-08 DIAGNOSIS — R00.2 PALPITATIONS: Primary | ICD-10-CM

## 2017-09-08 LAB
ALBUMIN SERPL-MCNC: 3.4 G/DL (ref 3.5–5)
ALBUMIN/GLOB SERPL: 0.8 {RATIO} (ref 1.1–2.2)
ALP SERPL-CCNC: 101 U/L (ref 45–117)
ALT SERPL-CCNC: 26 U/L (ref 12–78)
ANION GAP SERPL CALC-SCNC: 7 MMOL/L (ref 5–15)
AST SERPL-CCNC: 14 U/L (ref 15–37)
ATRIAL RATE: 86 BPM
BASOPHILS # BLD: 0 K/UL (ref 0–0.1)
BASOPHILS NFR BLD: 0 % (ref 0–1)
BILIRUB SERPL-MCNC: 0.3 MG/DL (ref 0.2–1)
BUN SERPL-MCNC: 10 MG/DL (ref 6–20)
BUN/CREAT SERPL: 13 (ref 12–20)
CALCIUM SERPL-MCNC: 8.6 MG/DL (ref 8.5–10.1)
CALCULATED P AXIS, ECG09: 24 DEGREES
CALCULATED R AXIS, ECG10: 53 DEGREES
CALCULATED T AXIS, ECG11: 35 DEGREES
CHLORIDE SERPL-SCNC: 105 MMOL/L (ref 97–108)
CO2 SERPL-SCNC: 27 MMOL/L (ref 21–32)
CREAT SERPL-MCNC: 0.79 MG/DL (ref 0.55–1.02)
D DIMER PPP FEU-MCNC: 0.71 MG/L FEU (ref 0–0.65)
DIAGNOSIS, 93000: NORMAL
EOSINOPHIL # BLD: 0.1 K/UL (ref 0–0.4)
EOSINOPHIL NFR BLD: 2 % (ref 0–7)
ERYTHROCYTE [DISTWIDTH] IN BLOOD BY AUTOMATED COUNT: 13 % (ref 11.5–14.5)
GLOBULIN SER CALC-MCNC: 4.4 G/DL (ref 2–4)
GLUCOSE SERPL-MCNC: 93 MG/DL (ref 65–100)
HCG UR QL: NEGATIVE
HCT VFR BLD AUTO: 37.3 % (ref 35–47)
HGB BLD-MCNC: 12.7 G/DL (ref 11.5–16)
LYMPHOCYTES # BLD: 3.2 K/UL (ref 0.8–3.5)
LYMPHOCYTES NFR BLD: 37 % (ref 12–49)
MCH RBC QN AUTO: 30.8 PG (ref 26–34)
MCHC RBC AUTO-ENTMCNC: 34 G/DL (ref 30–36.5)
MCV RBC AUTO: 90.3 FL (ref 80–99)
MONOCYTES # BLD: 0.5 K/UL (ref 0–1)
MONOCYTES NFR BLD: 6 % (ref 5–13)
NEUTS SEG # BLD: 4.9 K/UL (ref 1.8–8)
NEUTS SEG NFR BLD: 55 % (ref 32–75)
P-R INTERVAL, ECG05: 144 MS
PLATELET # BLD AUTO: 332 K/UL (ref 150–400)
POTASSIUM SERPL-SCNC: 4 MMOL/L (ref 3.5–5.1)
PROT SERPL-MCNC: 7.8 G/DL (ref 6.4–8.2)
Q-T INTERVAL, ECG07: 368 MS
QRS DURATION, ECG06: 78 MS
QTC CALCULATION (BEZET), ECG08: 440 MS
RBC # BLD AUTO: 4.13 M/UL (ref 3.8–5.2)
SODIUM SERPL-SCNC: 139 MMOL/L (ref 136–145)
TROPONIN I SERPL-MCNC: <0.04 NG/ML
VENTRICULAR RATE, ECG03: 86 BPM
WBC # BLD AUTO: 8.8 K/UL (ref 3.6–11)

## 2017-09-08 PROCEDURE — 84484 ASSAY OF TROPONIN QUANT: CPT | Performed by: EMERGENCY MEDICINE

## 2017-09-08 PROCEDURE — 71275 CT ANGIOGRAPHY CHEST: CPT

## 2017-09-08 PROCEDURE — 81025 URINE PREGNANCY TEST: CPT

## 2017-09-08 PROCEDURE — 96374 THER/PROPH/DIAG INJ IV PUSH: CPT

## 2017-09-08 PROCEDURE — 36415 COLL VENOUS BLD VENIPUNCTURE: CPT | Performed by: EMERGENCY MEDICINE

## 2017-09-08 PROCEDURE — 85379 FIBRIN DEGRADATION QUANT: CPT | Performed by: EMERGENCY MEDICINE

## 2017-09-08 PROCEDURE — 99283 EMERGENCY DEPT VISIT LOW MDM: CPT

## 2017-09-08 PROCEDURE — 74011250636 HC RX REV CODE- 250/636: Performed by: EMERGENCY MEDICINE

## 2017-09-08 PROCEDURE — 71020 XR CHEST PA LAT: CPT

## 2017-09-08 PROCEDURE — 85025 COMPLETE CBC W/AUTO DIFF WBC: CPT | Performed by: EMERGENCY MEDICINE

## 2017-09-08 PROCEDURE — 80053 COMPREHEN METABOLIC PANEL: CPT | Performed by: EMERGENCY MEDICINE

## 2017-09-08 PROCEDURE — 96361 HYDRATE IV INFUSION ADD-ON: CPT

## 2017-09-08 PROCEDURE — 93005 ELECTROCARDIOGRAM TRACING: CPT

## 2017-09-08 PROCEDURE — 74011636320 HC RX REV CODE- 636/320: Performed by: EMERGENCY MEDICINE

## 2017-09-08 RX ORDER — LORAZEPAM 2 MG/ML
0.5 INJECTION INTRAMUSCULAR
Status: COMPLETED | OUTPATIENT
Start: 2017-09-08 | End: 2017-09-08

## 2017-09-08 RX ORDER — DIAZEPAM 5 MG/1
2 TABLET ORAL
Qty: 15 TAB | Refills: 0 | Status: SHIPPED | OUTPATIENT
Start: 2017-09-08 | End: 2017-09-20 | Stop reason: ALTCHOICE

## 2017-09-08 RX ORDER — SODIUM CHLORIDE 9 MG/ML
50 INJECTION, SOLUTION INTRAVENOUS
Status: COMPLETED | OUTPATIENT
Start: 2017-09-08 | End: 2017-09-08

## 2017-09-08 RX ORDER — DIAZEPAM 10 MG/2ML
2 INJECTION INTRAMUSCULAR
Status: DISCONTINUED | OUTPATIENT
Start: 2017-09-08 | End: 2017-09-08 | Stop reason: RX

## 2017-09-08 RX ORDER — SODIUM CHLORIDE 0.9 % (FLUSH) 0.9 %
10 SYRINGE (ML) INJECTION
Status: COMPLETED | OUTPATIENT
Start: 2017-09-08 | End: 2017-09-08

## 2017-09-08 RX ADMIN — SODIUM CHLORIDE 500 ML: 900 INJECTION, SOLUTION INTRAVENOUS at 09:10

## 2017-09-08 RX ADMIN — IOPAMIDOL 80 ML: 755 INJECTION, SOLUTION INTRAVENOUS at 10:59

## 2017-09-08 RX ADMIN — Medication 10 ML: at 10:59

## 2017-09-08 RX ADMIN — SODIUM CHLORIDE 50 ML/HR: 900 INJECTION, SOLUTION INTRAVENOUS at 10:59

## 2017-09-08 RX ADMIN — LORAZEPAM 0.5 MG: 2 INJECTION INTRAMUSCULAR; INTRAVENOUS at 09:07

## 2017-09-08 NOTE — ED NOTES
Patient arrived with complaints of intermittnet chest pain and shortness of breath for two months. Patient denies any other symptoms.  Call bell within reach and mother at bedside

## 2017-09-08 NOTE — Clinical Note
Thank you for allowing us to provide you with excellent care today. We hope we addressed all of your concerns and needs. We strive to provide excellent quality care in the Emergency Department. Please rate us as excellent, as anything less than exce llent does not meet our expectations. If you feel that you have not received excellent quality care or timely care, please ask to speak to the nurse manager. Please choose us in the future for your continued health care needs. The exam and treatm ent you received in the Emergency Department were for an urgent problem and are not intended as complete care. It is important that you follow-up with a doctor, nurse practitioner, or physician assistant to:  (1) confirm your diagnosis,  (2) re-evaluatio n of changes in your illness and treatment, and  (3) for ongoing care. If your symptoms become worse or you do not improve as expected and you are unable to reach your usual health care provider, you should return to the Emergency Department. We are klaus ilable 24 hours a day. Take this sheet with you when you go to your follow-up visit. If you have any problem arranging the follow-up visit, contact [address] [place] [address] 21 [phone]) Make an appointment with your Primary Care doctor for follow up of this visit. Re turn to the ER if you are unable to be seen in the time recommended on your discharge instructions.

## 2017-09-08 NOTE — ED PROVIDER NOTES
[address]  EMERGENCY DEPARTMENT HISTORY AND PHYSICAL EXAM       Date of Service: 9/8/2017   Patient Name: Alberto Bhagat   YOB: 1995  Medical Record Number: [id number]    History of Presenting Illness     Chief Complaint   Patient presents with    Chest Pain     for the past two months with intermittent SOB and palpitations. History Provided By:  patient    Additional History:   Alberto Bhagat is a 25 y.o. female with PMhx significant for migraines who presents ambulatory to the ED with cc of chest tightness x 2 months with intermittent palpitations, HA, and back pain. Pt states that she was seen recently by her PCP regarding symptoms, however, she states they are progressively worsening. She describes the pain as sharp, non-radiating, and moderate in severity. She reports use of prescription strength Ibuprofen as prescribed by her PCP without relief. She denies any exacerbating or alleviating factors. She specifically denies any fevers, chills, nausea, vomiting, shortness of breath, headache, rash, diarrhea, sweating or weight loss. Social Hx: - Tobacco, - EtOH, - Illicit Drugs    There are no other complaints, changes or physical findings at this time.     Primary Care Provider: Sofiya Church MD     Past History     Past Medical History:   Past Medical History:   Diagnosis Date    Migraines 2007    Mild depression ([facility] [address] 3/10/2015    Obesity 2007    RAD (reactive airway disease) 0365,1872    Rhinitis 2005    Allergic & Seasonal        Past Surgical History:   Past Surgical History:   Procedure Laterality Date    HX OTHER SURGICAL Right 4/2013    had piece of bone removed from right arm        Family History:   Family History   Problem Relation Age of Onset    Other Mother      MYASTHENIA GRAVIS    Hypertension Mother     Heart Disease Maternal Grandmother     Cancer Maternal Grandmother      breast cancer    Cancer Maternal Aunt      breast cancer    Diabetes Maternal Aunt     Diabetes Maternal Uncle         Social History:   Social History   Substance Use Topics    Smoking status: Never Smoker    Smokeless tobacco: Never Used    Alcohol use No        Allergies:   No Known Allergies      Review of Systems   Review of Systems   Constitutional: Negative. Negative for activity change, appetite change, chills, diaphoresis, fatigue, fever and unexpected weight change. HENT: Negative. Negative for congestion, hearing loss, rhinorrhea, sneezing and voice change. Eyes: Negative. Negative for pain and visual disturbance. Respiratory: Negative. Negative for apnea, cough, choking and shortness of breath. Cardiovascular: Positive for chest pain (tightness) and palpitations. Gastrointestinal: Negative. Negative for abdominal distention, abdominal pain, blood in stool, diarrhea, nausea and vomiting. Genitourinary: Negative. Negative for difficulty urinating, flank pain, frequency and urgency. No discharge   Musculoskeletal: Positive for back pain. Negative for arthralgias, myalgias and neck stiffness. Skin: Negative. Negative for color change and rash. Neurological: Positive for headaches. Negative for dizziness, seizures, syncope, speech difficulty, weakness and numbness. Hematological: Negative for adenopathy. Psychiatric/Behavioral: Negative. Negative for agitation, behavioral problems, dysphoric mood and suicidal ideas. The patient is not nervous/anxious. Physical Exam  Vitals:    09/08/17 0830 09/08/17 1024   BP: 131/75 137/75   Pulse: 91 88   Resp: 16 17   Temp: 98.3 °F (36.8 °C)    SpO2: 100% 99%   Weight: 120.5 kg (265 lb 10.5 oz)    Height: 5' 9\" (1.753 m)        Physical Exam   Constitutional: She is oriented to person, place, and time. She appears well-developed and well-nourished. No distress. HENT:   Head: Normocephalic and atraumatic.    Mouth/Throat: Oropharynx is clear and moist. No oropharyngeal exudate. Eyes: Conjunctivae and EOM are normal. Pupils are equal, round, and reactive to light. Right eye exhibits no discharge. Left eye exhibits no discharge. Neck: Normal range of motion. Neck supple. Cardiovascular: Normal rate, regular rhythm and intact distal pulses. Exam reveals no gallop and no friction rub. No murmur heard. Pulmonary/Chest: Effort normal and breath sounds normal. No respiratory distress. She has no wheezes. She has no rales. She exhibits no tenderness. Abdominal: Soft. Bowel sounds are normal. She exhibits no distension and no mass. There is no tenderness. There is no rebound and no guarding. Musculoskeletal: Normal range of motion. She exhibits no edema. Lymphadenopathy:     She has no cervical adenopathy. Neurological: She is alert and oriented to person, place, and time. No cranial nerve deficit. Coordination normal.   Skin: Skin is warm and dry. No rash noted. No erythema. Psychiatric: She has a normal mood and affect. Nursing note and vitals reviewed. Medical Decision Making   I am the first provider for this patient. I reviewed the vital signs, available nursing notes, past medical history, past surgical history, family history and social history. Old Medical Records: Old medical records. Provider Notes:   DDx: Musculoskeletal vs anxiety, stress. Will access with basic labs, UA, chest x-ray, D-dimer. ED Course:  8:51 AM   Initial assessment performed. The patients presenting problems have been discussed, and they are in agreement with the care plan formulated and outlined with them. I have encouraged them to ask questions as they arise throughout their visit.     Progress Notes:   10:09 AM  Pt has been updated on plan for CTA chest.     Diagnostic Study Results     Labs -      Recent Results (from the past 12 hour(s))   EKG, 12 LEAD, INITIAL    Collection Time: 09/08/17  8:25 AM   Result Value Ref Range    Ventricular Rate 86 BPM    Atrial Rate 86 BPM    P-R Interval 144 ms    QRS Duration 78 ms    Q-T Interval 368 ms    QTC Calculation (Bezet) 440 ms    Calculated P Axis 24 degrees    Calculated R Axis 53 degrees    Calculated T Axis 35 degrees    Diagnosis       ** Poor data quality, interpretation may be adversely affected  Recommend repeat  Normal sinus rhythm  Normal ECG  No previous ECGs available  Confirmed by Regi Ham MD, Larry Maddi ([id number]) on 9/8/2017 11:24:25 AM     CBC WITH AUTOMATED DIFF    Collection Time: 09/08/17  8:30 AM   Result Value Ref Range    WBC 8.8 3.6 - 11.0 K/uL    RBC 4.13 3.80 - 5.20 M/uL    HGB 12.7 11.5 - 16.0 g/dL    HCT 37.3 35.0 - 47.0 %    MCV 90.3 80.0 - 99.0 FL    MCH 30.8 26.0 - 34.0 PG    MCHC 34.0 30.0 - 36.5 g/dL    RDW 13.0 11.5 - 14.5 %    PLATELET 416 409 - 222 K/uL    NEUTROPHILS 55 32 - 75 %    LYMPHOCYTES 37 12 - 49 %    MONOCYTES 6 5 - 13 %    EOSINOPHILS 2 0 - 7 %    BASOPHILS 0 0 - 1 %    ABS. NEUTROPHILS 4.9 1.8 - 8.0 K/UL    ABS. LYMPHOCYTES 3.2 0.8 - 3.5 K/UL    ABS. MONOCYTES 0.5 0.0 - 1.0 K/UL    ABS. EOSINOPHILS 0.1 0.0 - 0.4 K/UL    ABS. BASOPHILS 0.0 0.0 - 0.1 K/UL   METABOLIC PANEL, COMPREHENSIVE    Collection Time: 09/08/17  8:30 AM   Result Value Ref Range    Sodium 139 136 - 145 mmol/L    Potassium 4.0 3.5 - 5.1 mmol/L    Chloride 105 97 - 108 mmol/L    CO2 27 21 - 32 mmol/L    Anion gap 7 5 - 15 mmol/L    Glucose 93 65 - 100 mg/dL    BUN 10 6 - 20 MG/DL    Creatinine 0.79 0.55 - 1.02 MG/DL    BUN/Creatinine ratio 13 12 - 20      GFR est AA >60 >60 ml/min/1.73m2    GFR est non-AA >60 >60 ml/min/1.73m2    Calcium 8.6 8.5 - 10.1 MG/DL    Bilirubin, total 0.3 0.2 - 1.0 MG/DL    ALT (SGPT) 26 12 - 78 U/L    AST (SGOT) 14 (L) 15 - 37 U/L    Alk.  phosphatase 101 45 - 117 U/L    Protein, total 7.8 6.4 - 8.2 g/dL    Albumin 3.4 (L) 3.5 - 5.0 g/dL    Globulin 4.4 (H) 2.0 - 4.0 g/dL    A-G Ratio 0.8 (L) 1.1 - 2.2     TROPONIN I    Collection Time: 09/08/17  8:30 AM   Result Value Ref Range    Troponin-I, Qt. <0.04 <0.05 ng/mL   D DIMER    Collection Time: 09/08/17  8:30 AM   Result Value Ref Range    D-dimer 0.71 (H) 0.00 - 0.65 mg/L FEU   HCG URINE, QL. - POC    Collection Time: 09/08/17 10:23 AM   Result Value Ref Range    Pregnancy test,urine (POC) NEGATIVE  NEG         Radiologic Studies -  The following have been ordered and reviewed:  CT Results  (Last 48 hours)               09/08/17 1105  CTA CHEST W OR W WO CONT Final result    Impression:  IMPRESSION:   No acute process. Narrative:  INDICATION:  Chest pain, normal EKG        EXAM:  CTA Chest with contrast for Pulmonary Embolus       COMPARISON:  None       TECHNIQUE:  Following the uneventful intravenous administration of 100 cc Isovue   210, thin helical axial images were obtained through the chest. 3D image   postprocessing was performed. CT dose reduction was achieved through use of a   standardized protocol tailored for this examination and automatic exposure   control for dose modulation. FINDINGS:       THYROID: Unremarkable. MEDIASTINUM/MAKENZIE: No lymphadenopathy. Residual final tissue anterior   mediastinum. HEART/PERICARDIUM: Unremarkable. PULMONARY ARTERIES:No pulmonary embolism. AORTA:  No aneurysm. LUNGS/PLEURA: No nodule, mass, or airspace disease. INCIDENTALLY IMAGED UPPER ABDOMEN: No significant abnormality. BONES: No destructive bone lesion. ADDITIONAL COMMENTS:  N/A               CXR Results  (Last 48 hours)               09/08/17 0946  XR CHEST PA LAT Final result    Impression:  IMPRESSION:   No acute process. Narrative:  INDICATION:   Chest Pain for 2 months with intermittent shortness of breath and   palpitations       COMPARISON: March 20, 2017       FINDINGS:       Frontal and lateral views of the chest demonstrate a normal cardiomediastinal   silhouette. The lungs are adequately expanded. There is no edema, effusion,   consolidation, or pneumothorax.  The osseous structures are unremarkable. Vital Signs-Reviewed the patient's vital signs. Patient Vitals for the past 12 hrs:   Temp Pulse Resp BP SpO2   09/08/17 1024 - 88 17 137/75 99 %   09/08/17 0830 98.3 °F (36.8 °C) 91 16 131/75 100 %       Medications Given in the ED:  Medications   sodium chloride 0.9 % bolus infusion 500 mL (0 mL IntraVENous IV Completed 9/8/17 1021)   LORazepam (ATIVAN) injection 0.5 mg (0.5 mg IntraVENous Given 9/8/17 0907)   0.9% sodium chloride infusion (50 mL/hr IntraVENous New Bag 9/8/17 1059)   sodium chloride (NS) flush 10 mL (10 mL IntraVENous Given 9/8/17 1059)   iopamidol (ISOVUE-370) 76 % injection 100 mL (80 mL IntraVENous Given 9/8/17 1059)       Pulse Oximetry Analysis - Normal 100% on RA     Cardiac Monitor:   Rate: 88  Rhythm: Normal Sinus Rhythm     EKG interpretation: (Preliminary) 0828  Rhythm: normal sinus rhythm; and regular . Rate (approx.): 86; Axis: normal; P wave: normal; QRS interval: normal ; ST/T wave: normal;   Written by Ida Clark ED Scribe, as dictated by Kamran Padilla. Juancarlos Palafox MD.    Diagnosis   Clinical Impression:   1. Palpitations         Disposition Note:  Discharge Note:  12:30 PM  The patient has been re-evaluated and is ready for discharge. Reviewed available results with patient. Counseled patient on diagnosis and care plan. Patient has expressed understanding, and all questions have been answered. Patient agrees with plan and agrees to follow up as recommended, or return to the ED if their symptoms worsen.  Discharge instructions have been provided and explained to the patient, along with reasons to return to the ED.    1:   Follow-up Information     Follow up With Details Comments [address], [place]   [address] [place] [address]      Cherise Schaeffer MD Call in 2 days As needed, If symptoms worsen [address]  [id number]  [address] [phone]          2:   Current Discharge Medication List START taking these medications    Details   diazePAM (VALIUM) 5 mg tablet Take 0.5 Tabs by mouth every eight (8) hours as needed (spasm). Max Daily Amount: 7.5 mg.  Qty: 15 Tab, Refills: 0           Return to ED if Worse    _______________________________   Attestations: This note is prepared by Keith Rangel, acting as Scribe for [employer] Sebastian Talbot, [address] [facility] Sebastian Talbot MD: The scribe's documentation has been prepared under my direction and personally reviewed by me in its entirety.  I confirm that the note above accurately reflects all work, treatment, procedures, and medical decision making performed by me.  _______________________________

## 2017-09-08 NOTE — DISCHARGE INSTRUCTIONS
Palpitations: Care Instructions  Your Care Instructions    Heart palpitations are the uncomfortable sensation that your heart is beating fast or irregularly. You might feel pounding or fluttering in your chest. It might feel like your heart is skipping a beat. Although palpitations may be caused by a heart problem, they also occur because of stress, fatigue, or use of alcohol, caffeine, or nicotine. Many medicines, including diet pills, antihistamines, decongestants, and some herbal products, can cause heart palpitations. Nearly everyone has palpitations from time to time. Depending on your symptoms, your doctor may need to do more tests to try to find the cause of your palpitations. Follow-up care is a key part of your treatment and safety. Be sure to make and go to all appointments, and call your doctor if you are having problems. It's also a good idea to know your test results and keep a list of the medicines you take. How can you care for yourself at home? · Avoid caffeine, nicotine, and excess alcohol. · Do not take illegal drugs, such as methamphetamines and cocaine. · Do not take weight loss or diet medicines unless you talk with your doctor first.  · Get plenty of sleep. · Do not overeat. · If you have palpitations again, take deep breaths and try to relax. This may slow a racing heart. · If you start to feel lightheaded, lie down to avoid injuries that might result if you pass out and fall down. · Keep a record of your palpitations and bring it to your next doctor's appointment. Write down:  ¨ The date and time. ¨ Your pulse. (If your heart is beating fast, it may be hard to count your pulse.)  ¨ What you were doing when the palpitations started. ¨ How long the palpitations lasted. ¨ Any other symptoms. · If an activity causes palpitations, slow down or stop. Talk to your doctor before you do that activity again. · Take your medicines exactly as prescribed.  Call your doctor if you think you are having a problem with your medicine. When should you call for help? Call 911 anytime you think you may need emergency care. For example, call if:  · You passed out (lost consciousness). · You have symptoms of a heart attack. These may include:  ¨ Chest pain or pressure, or a strange feeling in the chest.  ¨ Sweating. ¨ Shortness of breath. ¨ Pain, pressure, or a strange feeling in the back, neck, jaw, or upper belly or in one or both shoulders or arms. ¨ Lightheadedness or sudden weakness. ¨ A fast or irregular heartbeat. After you call 911, the  may tell you to chew 1 adult-strength or 2 to 4 low-dose aspirin. Wait for an ambulance. Do not try to drive yourself. · You have symptoms of a stroke. These may include:  ¨ Sudden numbness, tingling, weakness, or loss of movement in your face, arm, or leg, especially on only one side of your body. ¨ Sudden vision changes. ¨ Sudden trouble speaking. ¨ Sudden confusion or trouble understanding simple statements. ¨ Sudden problems with walking or balance. ¨ A sudden, severe headache that is different from past headaches. Call your doctor now or seek immediate medical care if:  · You have heart palpitations and:  ¨ Are dizzy or lightheaded, or you feel like you may faint. ¨ Have new or increased shortness of breath. Watch closely for changes in your health, and be sure to contact your doctor if:  · You continue to have heart palpitations. Where can you learn more? Go to http://william-kory.info/. Enter R508 in the search box to learn more about \"Palpitations: Care Instructions. \"  Current as of: April 3, 2017  Content Version: 11.3  © 7779-1727 [employer]. Care instructions adapted under license by [employer] (which disclaims liability or warranty for this information).  If you have questions about a medical condition or this instruction, always ask your healthcare professional. [employer] disclaims any warranty or liability for your use of this information.

## 2017-09-20 ENCOUNTER — OFFICE VISIT (OUTPATIENT)
Dept: INTERNAL MEDICINE CLINIC | Age: 22
End: 2017-09-20

## 2017-09-20 VITALS
DIASTOLIC BLOOD PRESSURE: 77 MMHG | SYSTOLIC BLOOD PRESSURE: 129 MMHG | HEIGHT: 69 IN | HEART RATE: 87 BPM | TEMPERATURE: 98 F | WEIGHT: 261 LBS | BODY MASS INDEX: 38.66 KG/M2 | RESPIRATION RATE: 18 BRPM

## 2017-09-20 DIAGNOSIS — F41.0 PANIC DISORDER WITHOUT AGORAPHOBIA: ICD-10-CM

## 2017-09-20 DIAGNOSIS — F33.1 MODERATE EPISODE OF RECURRENT MAJOR DEPRESSIVE DISORDER (HCC): Primary | ICD-10-CM

## 2017-09-20 RX ORDER — ALPRAZOLAM 0.5 MG/1
0.5 TABLET ORAL
Qty: 30 TAB | Refills: 0 | Status: SHIPPED | OUTPATIENT
Start: 2017-09-20 | End: 2018-08-21 | Stop reason: SDUPTHER

## 2017-09-20 RX ORDER — SERTRALINE HYDROCHLORIDE 50 MG/1
50 TABLET, FILM COATED ORAL DAILY
Qty: 30 TAB | Refills: 1 | Status: SHIPPED | OUTPATIENT
Start: 2017-09-20 | End: 2017-10-20 | Stop reason: DRUGHIGH

## 2017-09-20 NOTE — PATIENT INSTRUCTIONS

## 2017-09-20 NOTE — PROGRESS NOTES
Reviewed record  In preparation for visit and have obtained necessary documentation. 1. Have you been to the ER, urgent care clinic since your last visit? Hospitalized since your last visit?seen at [facility]  2. Have you seen or consulted any other health care providers outside of the [address] [place] [address] since your last visit? Include any pap smears or colon screening. Referred to Dr Adarsh Lott  Advanced directives: Patient has been given information on advanced directives at a previous visit. Patients vital signs discussed with physician. Has not had pap smear.

## 2017-09-20 NOTE — MR AVS SNAPSHOT
Visit Information Date & Time Provider Department Dept. Phone Encounter #  
 9/20/2017  1:20 PM MD Jordan Triana [phone] [id number] Follow-up Instructions Return in about 1 month (around 10/20/2017), or if symptoms worsen or fail to improve, for Anxiety, depression. Your Appointments 12/20/2017  8:00 AM  
ROUTINE CARE with MD Jordan Triana [facility]) Appt Note: 1yr f/u; Annual physical exam, fasting labs [address] [address] [phone]  
  
   
 [address] Upcoming Health Maintenance Date Due  
 PAP AKA CERVICAL CYTOLOGY 2/11/2016 DTaP/Tdap/Td series (3 - Td) 7/5/2027 Allergies as of 9/20/2017  Review Complete On: 9/20/2017 By: Monika Borjas MD  
 No Known Allergies Current Immunizations  Reviewed on 4/17/2015 Name Date Human Papillomavirus 10/23/2012, 5/29/2012, 3/27/2012 Influenza Vaccine 10/30/2013 Influenza Vaccine PF 1/9/2013 Meningococcal Vaccine 3/27/2012 TDAP Vaccine 9/19/2006 Not reviewed this visit You Were Diagnosed With   
  
 Codes Comments Moderate episode of recurrent major depressive disorder ([facility]    -  Primary ICD-10-CM: F33.1 ICD-9-CM: 296.32 Panic disorder without agoraphobia     ICD-10-CM: F41.0 ICD-9-CM: 300.01 Vitals BP Pulse Temp Resp Height(growth percentile) Weight(growth percentile) 129/77 (BP 1 Location: Left arm, BP Patient Position: Sitting) 87 98 °F (36.7 °C) (Oral) 18 5' 9\" (1.753 m) 261 lb (118.4 kg) LMP BMI OB Status Smoking Status 08/14/2017 38.54 kg/m2 Having regular periods Never Smoker BMI and BSA Data Body Mass Index Body Surface Area 38.54 kg/m 2 2.4 m 2 Preferred Pharmacy Pharmacy Name Phone [facility] [address] [place], [place] - [address] [place] [address] [place] [phone] Your Updated Medication List  
  
   
This list is accurate as of: 9/20/17  1:44 PM.  Always use your most recent med list.  
  
  
  
  
 ALPRAZolam 0.5 mg tablet Commonly known as:  Fairy Ny Take 1 Tab by mouth two (2) times daily as needed for Anxiety. Max Daily Amount: 1 mg. Indications: ANXIETY WITH DEPRESSION  
  
 fexofenadine 180 mg tablet Commonly known as:  Licha Barbara Take 1 Tab by mouth daily. Indications: ALLERGIC RHINITIS  
  
 sertraline 50 mg tablet Commonly known as:  ZOLOFT Take 1 Tab by mouth daily. Indications: ANXIETY WITH DEPRESSION  
  
 VITAMIN D3 1,000 unit Cap Generic drug:  cholecalciferol Take  by mouth daily. Prescriptions Printed Refills ALPRAZolam (XANAX) 0.5 mg tablet 0 Sig: Take 1 Tab by mouth two (2) times daily as needed for Anxiety. Max Daily Amount: 1 mg. Indications: ANXIETY WITH DEPRESSION Class: Print Route: Oral  
  
Prescriptions Sent to Pharmacy Refills  
 sertraline (ZOLOFT) 50 mg tablet 1 Sig: Take 1 Tab by mouth daily. Indications: ANXIETY WITH DEPRESSION Class: Normal  
 Pharmacy: [address] Ph #: [phone] Route: Oral  
  
Follow-up Instructions Return in about 1 month (around 10/20/2017), or if symptoms worsen or fail to improve, for Anxiety, depression. Patient Instructions Anxiety Disorder: Care Instructions Your Care Instructions Anxiety is a normal reaction to stress. Difficult situations can cause you to have symptoms such as sweaty palms and a nervous feeling. In an anxiety disorder, the symptoms are far more severe. Constant worry, muscle tension, trouble sleeping, nausea and diarrhea, and other symptoms can make normal daily activities difficult or impossible. These symptoms may occur for no reason, and they can affect your work, school, or social life. Medicines, counseling, and self-care can all help. Follow-up care is a key part of your treatment and safety. Be sure to make and go to all appointments, and call your doctor if you are having problems. It's also a good idea to know your test results and keep a list of the medicines you take. How can you care for yourself at home? · Take medicines exactly as directed. Call your doctor if you think you are having a problem with your medicine. · Go to your counseling sessions and follow-up appointments. · Recognize and accept your anxiety. Then, when you are in a situation that makes you anxious, say to yourself, \"This is not an emergency. I feel uncomfortable, but I am not in danger. I can keep going even if I feel anxious. \" · Be kind to your body: ¨ Relieve tension with exercise or a massage. ¨ Get enough rest. 
¨ Avoid alcohol, caffeine, nicotine, and illegal drugs. They can increase your anxiety level and cause sleep problems. ¨ Learn and do relaxation techniques. See below for more about these techniques. · Engage your mind. Get out and do something you enjoy. Go to a [employer] movie, or take a walk or hike. Plan your day. Having too much or too little to do can make you anxious. · Keep a record of your symptoms. Discuss your fears with a good friend or family member, or join a support group for people with similar problems. Talking to others sometimes relieves stress. · Get involved in social groups, or volunteer to help others. Being alone sometimes makes things seem worse than they are. · Get at least 30 minutes of exercise on most days of the week to relieve stress. Walking is a good choice. You also may want to do other activities, such as running, swimming, cycling, or playing tennis or team sports. Relaxation techniques Do relaxation exercises 10 to 20 minutes a day. You can play soothing, relaxing music while you do them, if you wish. · Tell others in your house that you are going to do your relaxation exercises. Ask them not to disturb you. · Find a comfortable place, away from all distractions and noise. · Lie down on your back, or sit with your back straight. · Focus on your breathing. Make it slow and steady. · Breathe in through your nose. Breathe out through either your nose or mouth. · Breathe deeply, filling up the area between your navel and your rib cage. Breathe so that your belly goes up and down. · Do not hold your breath. · Breathe like this for 5 to 10 minutes. Notice the feeling of calmness throughout your whole body. As you continue to breathe slowly and deeply, relax by doing the following for another 5 to 10 minutes: · Tighten and relax each muscle group in your body. You can begin at your toes and work your way up to your head. · Imagine your muscle groups relaxing and becoming heavy. · Empty your mind of all thoughts. · Let yourself relax more and more deeply. · Become aware of the state of calmness that surrounds you. · When your relaxation time is over, you can bring yourself back to alertness by moving your fingers and toes and then your hands and feet and then stretching and moving your entire body. Sometimes people fall asleep during relaxation, but they usually wake up shortly afterward. · Always give yourself time to return to full alertness before you drive a car or do anything that might cause an accident if you are not fully alert. Never play a relaxation tape while you drive a car. When should you call for help? Call 911 anytime you think you may need emergency care. For example, call if: 
· You feel you cannot stop from hurting yourself or someone else. Keep the numbers for these national suicide hotlines: 4-337-018-TALK (5-251-570-924.848.9695) and 6-376-OSEWSYF (9-341.129.4992). If you or someone you know talks about suicide or feeling hopeless, get help right away. Watch closely for changes in your health, and be sure to contact your doctor if: 
· You have anxiety or fear that affects your life. · You have symptoms of anxiety that are new or different from those you had before. Where can you learn more? Go to http://william-kory.info/. Enter P754 in the search box to learn more about \"Anxiety Disorder: Care Instructions. \" Current as of: July 26, 2016 Content Version: 11.3 © 3991-4171 [employer]. Care instructions adapted under license by [employer] (which disclaims liability or warranty for this information). If you have questions about a medical condition or this instruction, always ask your healthcare professional. [address] any warranty or liability for your use of this information. Introducing \[facility]\"" & HEALTH SERVICES! Dear Justin Jaime: Thank you for requesting a Av[employer] account. Our records indicate that you already have an active Av[employer] account. You can access your account anytime at [email or web address] Did you know that you can access your hospital and ER discharge instructions at any time in Avatar Reality? You can also review all of your test results from your hospital stay or ER visit. Additional Information If you have questions, please visit the Frequently Asked Questions section of the [employer] website at [email or web address] Remember, [employer] is NOT to be used for urgent needs. For medical emergencies, dial 911. Now available from your iPhone and Android! Please provide this summary of care documentation to your next provider. Your primary care clinician is listed as Kirsten Garcia. If you have any questions after today's visit, please call [phone].

## 2017-09-20 NOTE — PROGRESS NOTES
CC:   Chief Complaint   Patient presents with    Other     ER follow up for anxiety/sob/palpations    Panic Attack     seeing therapist now       [address] [facility]  Josy Nogueira is a 25 y.o. female. Presents for ED follow-up evaluation. Seen at [facility] ED on 9/8/17 with palpitations. Cardiac work-up negative; was determined to have anxiety and discharged on diazepam. Today she complains of feeling depressed and anxious since February. Recently worsened when she started having palpitations. She presently has panic attacks 4-5 times a week. She experiences anxiety shortness of breath and palpitations with her panic attacks. Feels like she has not been functioning well. Reduced her school load to only one class this quarter. Her mother had to accompany her to her class recently because she felt too anxious. She has started following with a therapist at [facility]. She reports that she was on clonazepam in the past but it made her feel anxious and affected her memory and thinking. The same thing happened when she took diazepam recently. Soc Hx  Single. No children. 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 Never smoker. Is not sexually active. ROS  A complete review of systems was performed and is negative except for those mentioned in the HPI. Patient Active Problem List   Diagnosis Code    Obesity (BMI 35.0-39.9 without comorbidity) E66.9    Mild depression ([address] F32.0    Raynaud's phenomenon I73.00    Allergic rhinitis J30.9     Past Medical History:   Diagnosis Date    Migraines 2007    Mild depression ([address] 3/10/2015    Obesity 2007    RAD (reactive airway disease) 7291,1712    Rhinitis 2005    Allergic & Seasonal     No Known Allergies  Current Outpatient Prescriptions   Medication Sig Dispense Refill    fexofenadine (ALLEGRA) 180 mg tablet Take 1 Tab by mouth daily.  Indications: ALLERGIC RHINITIS 30 Tab 3    Cholecalciferol, Vitamin D3, (VITAMIN D3) 1,000 unit Cap Take  by mouth daily. PHYSICAL EXAM  Visit Vitals    /77 (BP 1 Location: Left arm, BP Patient Position: Sitting)    Pulse 87    Temp 98 °F (36.7 °C) (Oral)    Resp 18    Ht 5' 9\" (1.753 m)    Wt 261 lb (118.4 kg)    LMP 08/14/2017    BMI 38.54 kg/m2       General: Obese, no distress. HEENT:  Head normocephalic/atraumatic, no scleral icterus  Lungs:  Clear to ausculation bilaterally. Good air movement. Heart:  Regular rate and rhythm, normal S1 and S2, no murmur, gallop, or rub  Extremities: No clubbing, cyanosis, or edema. Neurological: Alert and oriented. Psychiatric: Normal mood and affect. Behavior is normal.     Results for orders placed or performed during the hospital encounter of 09/08/17   CBC WITH AUTOMATED DIFF   Result Value Ref Range    WBC 8.8 3.6 - 11.0 K/uL    RBC 4.13 3.80 - 5.20 M/uL    HGB 12.7 11.5 - 16.0 g/dL    HCT 37.3 35.0 - 47.0 %    MCV 90.3 80.0 - 99.0 FL    MCH 30.8 26.0 - 34.0 PG    MCHC 34.0 30.0 - 36.5 g/dL    RDW 13.0 11.5 - 14.5 %    PLATELET 208 709 - 568 K/uL    NEUTROPHILS 55 32 - 75 %    LYMPHOCYTES 37 12 - 49 %    MONOCYTES 6 5 - 13 %    EOSINOPHILS 2 0 - 7 %    BASOPHILS 0 0 - 1 %    ABS. NEUTROPHILS 4.9 1.8 - 8.0 K/UL    ABS. LYMPHOCYTES 3.2 0.8 - 3.5 K/UL    ABS. MONOCYTES 0.5 0.0 - 1.0 K/UL    ABS. EOSINOPHILS 0.1 0.0 - 0.4 K/UL    ABS. BASOPHILS 0.0 0.0 - 0.1 K/UL   METABOLIC PANEL, COMPREHENSIVE   Result Value Ref Range    Sodium 139 136 - 145 mmol/L    Potassium 4.0 3.5 - 5.1 mmol/L    Chloride 105 97 - 108 mmol/L    CO2 27 21 - 32 mmol/L    Anion gap 7 5 - 15 mmol/L    Glucose 93 65 - 100 mg/dL    BUN 10 6 - 20 MG/DL    Creatinine 0.79 0.55 - 1.02 MG/DL    BUN/Creatinine ratio 13 12 - 20      GFR est AA >60 >60 ml/min/1.73m2    GFR est non-AA >60 >60 ml/min/1.73m2    Calcium 8.6 8.5 - 10.1 MG/DL    Bilirubin, total 0.3 0.2 - 1.0 MG/DL    ALT (SGPT) 26 12 - 78 U/L    AST (SGOT) 14 (L) 15 - 37 U/L    Alk.  phosphatase 101 45 - 117 U/L    Protein, total 7.8 6.4 - 8.2 g/dL    Albumin 3.4 (L) 3.5 - 5.0 g/dL    Globulin 4.4 (H) 2.0 - 4.0 g/dL    A-G Ratio 0.8 (L) 1.1 - 2.2     TROPONIN I   Result Value Ref Range    Troponin-I, Qt. <0.04 <0.05 ng/mL   D DIMER   Result Value Ref Range    D-dimer 0.71 (H) 0.00 - 0.65 mg/L FEU   HCG URINE, QL. - POC   Result Value Ref Range    Pregnancy test,urine (POC) NEGATIVE  NEG     EKG, 12 LEAD, INITIAL   Result Value Ref Range    Ventricular Rate 86 BPM    Atrial Rate 86 BPM    P-R Interval 144 ms    QRS Duration 78 ms    Q-T Interval 368 ms    QTC Calculation (Bezet) 440 ms    Calculated P Axis 24 degrees    Calculated R Axis 53 degrees    Calculated T Axis 35 degrees    Diagnosis       ** Poor data quality, interpretation may be adversely affected  Recommend repeat  Normal sinus rhythm  Normal ECG  No previous ECGs available  Confirmed by Micah Mackey MD, Melvin Felipe ([id number]) on 9/8/2017 11:24:25 AM           ASSESSMENT AND PLAN    ICD-10-CM ICD-9-CM    1. Moderate episode of recurrent major depressive disorder (HCC) F33.1 296.32 sertraline (ZOLOFT) 50 mg tablet      ALPRAZolam (XANAX) 0.5 mg tablet   2. Panic disorder without agoraphobia F41.0 300.01 ALPRAZolam (XANAX) 0.5 mg tablet       Diagnoses and all orders for this visit:    1. Moderate episode of recurrent major depressive disorder (HCC)  -     Start sertraline (ZOLOFT) 50 mg tablet; Take 1 Tab by mouth daily. Indications: ANXIETY WITH DEPRESSION    2. Panic disorder without agoraphobia  -     Start ALPRAZolam (XANAX) 0.5 mg tablet; Take 1 Tab by mouth two (2) times daily as needed for Anxiety. Max Daily Amount: 1 mg. Indications: ANXIETY WITH DEPRESSION    Due for Pap smear. Follow-up Disposition:  Return in about 1 month (around 10/20/2017), or if symptoms worsen or fail to improve, for Anxiety, depression. Provided patient and/or family with advanced directive information and answered pertinent questions.   Encouraged patient to provide a copy of advanced directive to the office when available. I have discussed the diagnosis with the patient and the intended plan as seen in the above orders. Patient is in agreement. The patient has received an after-visit summary and questions were answered concerning future plans. I have discussed medication side effects and warnings with the patient as well.

## 2017-09-26 PROBLEM — F41.0 PANIC DISORDER: Status: ACTIVE | Noted: 2017-09-26

## 2017-09-26 PROBLEM — F64.0 GENDER DYSPHORIA IN ADULT: Status: ACTIVE | Noted: 2017-09-26

## 2017-10-20 ENCOUNTER — OFFICE VISIT (OUTPATIENT)
Dept: INTERNAL MEDICINE CLINIC | Age: 22
End: 2017-10-20

## 2017-10-20 VITALS
RESPIRATION RATE: 18 BRPM | WEIGHT: 261 LBS | HEART RATE: 89 BPM | OXYGEN SATURATION: 98 % | HEIGHT: 69 IN | TEMPERATURE: 98.1 F | DIASTOLIC BLOOD PRESSURE: 76 MMHG | BODY MASS INDEX: 38.66 KG/M2 | SYSTOLIC BLOOD PRESSURE: 121 MMHG

## 2017-10-20 DIAGNOSIS — F33.1 MODERATE EPISODE OF RECURRENT MAJOR DEPRESSIVE DISORDER (HCC): Primary | ICD-10-CM

## 2017-10-20 DIAGNOSIS — F64.0 GENDER DYSPHORIA IN ADULT: ICD-10-CM

## 2017-10-20 DIAGNOSIS — F41.0 PANIC DISORDER: ICD-10-CM

## 2017-10-20 RX ORDER — SERTRALINE HYDROCHLORIDE 100 MG/1
100 TABLET, FILM COATED ORAL DAILY
Qty: 30 TAB | Refills: 5 | Status: SHIPPED | OUTPATIENT
Start: 2017-10-20 | End: 2018-04-30 | Stop reason: ALTCHOICE

## 2017-10-20 NOTE — PROGRESS NOTES
CC:   Chief Complaint   Patient presents with    Anxiety     follow up    Depression       [address] is a 25 y.o. female. Presents for 1 month follow up evaluation. At last clinic, she complained of depression and anxiety since Feb. 2017. Was started on sertraline 50 mg daily and Xanax 0.5 mg BID as needed. Reports compliance with sertraline, but has not taken Xanax. States that her sertraline has helped the anxiety but no so much the depression. Still has depressed mood, lack of motivation, loss of interest in normally enjoyable activities such as reading, and poor appetite. Energy levels better, sleeps well. Denies SI/HI. Currently taking only one class this semester. Continues to follow with therapist at [facility]. States that she discussed with therapist starting hormone therapy to transition from female to male. Therapist plans to order labs on hormone levels first.     Other Providers: Brittny Dubon ([facility])    Soc Hx  Single. No children. 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 Never smoker. Is not sexually active. Wants to become transgender.     ROS  A complete review of systems was performed and is negative except for those mentioned in the HPI. Patient Active Problem List   Diagnosis Code    Obesity (BMI 35.0-39.9 without comorbidity) E66.9    Moderate episode of recurrent major depressive disorder ([address] F33.1    Raynaud's phenomenon I73.00    Allergic rhinitis J30.9    Panic disorder F41.0    Gender dysphoria in adult F61.0     Past Medical History:   Diagnosis Date    Migraines 2007    Mild depression ([facility] 3/10/2015    Obesity 2007    RAD (reactive airway disease) 0650,4430    Rhinitis 2005    Allergic & Seasonal     No Known Allergies  Current Outpatient Prescriptions   Medication Sig Dispense Refill    sertraline (ZOLOFT) 50 mg tablet Take 1 Tab by mouth daily.  Indications: ANXIETY WITH DEPRESSION 30 Tab 1  ALPRAZolam (XANAX) 0.5 mg tablet Take 1 Tab by mouth two (2) times daily as needed for Anxiety. Max Daily Amount: 1 mg. Indications: ANXIETY WITH DEPRESSION 30 Tab 0    fexofenadine (ALLEGRA) 180 mg tablet Take 1 Tab by mouth daily. Indications: ALLERGIC RHINITIS 30 Tab 3    Cholecalciferol, Vitamin D3, (VITAMIN D3) 1,000 unit Cap Take  by mouth daily. PHYSICAL EXAM  Visit Vitals    /76 (BP 1 Location: Left arm, BP Patient Position: Sitting)    Pulse 89    Temp 98.1 °F (36.7 °C) (Oral)    Resp 18    Ht 5' 9\" (1.753 m)    Wt 261 lb (118.4 kg)    LMP 10/19/2017    SpO2 98%    BMI 38.54 kg/m2       General: Obese, no distress. HEENT:  Head normocephalic/atraumatic, no scleral icterus  Neck: Supple. No carotid bruits, JVD, lymphadenopathy, or thyromegaly. Lungs:  Clear to ausculation bilaterally. Good air movement. Heart:  Regular rate and rhythm, normal S1 and S2, no murmur, gallop, or rub  Extremities: No clubbing, cyanosis, or edema. Neurological: Alert and oriented. Psychiatric: Normal mood and affect. Behavior is normal.         ASSESSMENT AND PLAN    ICD-10-CM ICD-9-CM    1. Moderate episode of recurrent major depressive disorder (HCC) F33.1 296.32 sertraline (ZOLOFT) 100 mg tablet   2. Panic disorder F41.0 300.01 sertraline (ZOLOFT) 100 mg tablet   3. Gender dysphoria in adult F64.0 302.85        Diagnoses and all orders for this visit:    1. Moderate episode of recurrent major depressive disorder (HCC)  Anxiety better, but still with depression. Increase sertraline dose from 50 to 100 mg daily. -     sertraline (ZOLOFT) 100 mg tablet; Take 1 Tab by mouth daily. Indications: ANXIETY WITH DEPRESSION    2. Panic disorder  -     sertraline (ZOLOFT) 100 mg tablet; Take 1 Tab by mouth daily. Indications: ANXIETY WITH DEPRESSION    3.  Gender dysphoria in adult      Follow-up Disposition:  Return in about 1 month (around 11/20/2017), or if symptoms worsen or fail to improve, for Depression, anxiety. Provided patient and/or family with advanced directive information and answered pertinent questions. Encouraged patient to provide a copy of advanced directive to the office when available. I have discussed the diagnosis with the patient and the intended plan as seen in the above orders. Patient is in agreement. The patient has received an after-visit summary and questions were answered concerning future plans. I have discussed medication side effects and warnings with the patient as well.

## 2017-10-20 NOTE — PROGRESS NOTES
Reviewed record  In preparation for visit and have obtained necessary documentation. 1. Have you been to the ER, urgent care clinic since your last visit? Hospitalized since your last visit?no  2. Have you seen or consulted any other health care providers outside of the [address] [place] [address] since your last visit? Include any pap smears or colon screening. no  Advanced directives: Patient has been given information on advanced directives at a previous visit. Patients vital signs discussed with physician. Patient does not want a pap smear.

## 2017-10-20 NOTE — PATIENT INSTRUCTIONS
Depression Treatment: Care Instructions  Your Care Instructions  Depression is a condition that affects the way you feel, think, and act. It causes symptoms such as low energy, loss of interest in daily activities, and sadness or grouchiness that goes on for a long time. Depression is very common and affects men and women of all ages. Depression is a medical illness caused by changes in the natural chemicals in your brain. It is not a character flaw, and it does not mean that you are a bad or weak person. It does not mean that you are going crazy. It is important to know that depression can be treated. Medicines, counseling, and self-care can all help. Many people do not get help because they are embarrassed or think that they will get over the depression on their own. But some people do not get better without treatment. Follow-up care is a key part of your treatment and safety. Be sure to make and go to all appointments, and call your doctor if you are having problems. It's also a good idea to know your test results and keep a list of the medicines you take. How can you care for yourself at home? Learn about antidepressant medicines  Antidepressant medicines can improve or end the symptoms of depression. You may need to take the medicine for at least 6 months, and often longer. Keep taking your medicine even if you feel better. If you stop taking it too soon, your symptoms may come back or get worse. You may start to feel better within 1 to 3 weeks of taking antidepressant medicine. But it can take as many as 6 to 8 weeks to see more improvement. Talk to your doctor if you have problems with your medicine or if you do not notice any improvement after 3 weeks. Antidepressants can make you feel tired, dizzy, or nervous. Some people have dry mouth, constipation, headaches, sexual problems, an upset stomach, or diarrhea.  Many of these side effects are mild and go away on their own after you take the medicine for a few weeks. Some may last longer. Talk to your doctor if side effects bother you too much. You might be able to try a different medicine. If you are pregnant or breastfeeding, talk to your doctor about what medicines you can take. Learn about counseling  In many cases, counseling can work as well as medicines to treat mild to moderate depression. Counseling is done by licensed mental health providers, such as psychologists, social workers, and some types of nurses. It can be done in one-on-one sessions or in a group setting. Many people find group sessions helpful. Cognitive-behavioral therapy is a type of counseling. In this treatment therapy, you learn how to see and change unhelpful thinking styles that may be adding to your depression. Counseling and medicines often work well when used together. To manage depression  · Be physically active. Getting 30 minutes of exercise each day is good for your body and your mind. Begin slowly if it is hard for you to get started. If you already exercise, keep it up. · Plan something pleasant for yourself every day. Include activities that you have enjoyed in the past.  · Get enough sleep. Talk to your doctor if you have problems sleeping. · Eat a balanced diet. If you do not feel hungry, eat small snacks rather than large meals. · Do not drink alcohol, use illegal drugs, or take medicines that your doctor has not prescribed for you. They may interfere with your treatment. · Spend time with family and friends. It may help to speak openly about your depression with people you trust.  · Take your medicines exactly as prescribed. Call your doctor if you think you are having a problem with your medicine. · Do not make major life decisions while you are depressed. Depression may change the way you think. You will be able to make better decisions after you feel better. · Think positively.  Challenge negative thoughts with statements such as \"I am hopeful\"; \"Things will get better\"; and \"I can ask for the help I need. \" Write down these statements and read them often, even if you don't believe them yet. · Be patient with yourself. It took time for your depression to develop, and it will take time for your symptoms to improve. Do not take on too much or be too hard on yourself. · Learn all you can about depression from written and online materials. · Check out behavioral health classes to learn more about dealing with depression. · Keep the numbers for these national suicide hotlines: 4-033-805-TALK (2-149.110.8270) and 3-800-HYTZWMR (1-366.248.4208). If you or someone you know talks about suicide or feeling hopeless, get help right away. When should you call for help? Call 911 anytime you think you may need emergency care. For example, call if:  · You feel you cannot stop from hurting yourself or someone else. Call your doctor now or seek immediate medical care if:  · You hear voices. · You feel much more depressed. Watch closely for changes in your health, and be sure to contact your doctor if:  · You are having problems with your depression medicine. · You are not getting better as expected. Where can you learn more? Go to http://william-kory.info/. Enter M746 in the search box to learn more about \"Depression Treatment: Care Instructions. \"  Current as of: July 26, 2016  Content Version: 11.3  © 8837-6068 [employer]. Care instructions adapted under license by [employer] (which disclaims liability or warranty for this information). If you have questions about a medical condition or this instruction, always ask your healthcare professional. [address] any warranty or liability for your use of this information.

## 2017-10-20 NOTE — MR AVS SNAPSHOT
Visit Information Date & Time Provider Department Dept. Phone Encounter #  
 10/20/2017  9:50 AM Antony Paz [facility] [phone] [id number] Follow-up Instructions Return in about 1 month (around 11/20/2017), or if symptoms worsen or fail to improve, for Depression, anxiety. Your Appointments 12/20/2017  8:00 AM  
ROUTINE CARE with MD Antony aPz [facility] [address] [facility]) Appt Note: 1yr f/u; Annual physical exam, fasting labs [address] [facility] [address] [phone]  
  
   
 [address] [facility] [address] Upcoming Health Maintenance Date Due  
 PAP AKA CERVICAL CYTOLOGY 10/20/2020 DTaP/Tdap/Td series (3 - Td) 7/5/2027 Allergies as of 10/20/2017  Review Complete On: 10/20/2017 By: Munira Foreman MD  
 No Known Allergies Current Immunizations  Reviewed on 4/17/2015 Name Date Human Papillomavirus 10/23/2012, 5/29/2012, 3/27/2012 Influenza Vaccine 10/30/2013 Influenza Vaccine PF 1/9/2013 Meningococcal Vaccine 3/27/2012 TDAP Vaccine 9/19/2006 Not reviewed this visit You Were Diagnosed With   
  
 Codes Comments Moderate episode of recurrent [facility]    -  Primary ICD-10-CM: F33.1 ICD-9-CM: 296.32 Panic disorder     ICD-10-CM: F41.0 ICD-9-CM: 300.01 Gender dysphoria in adult     ICD-10-CM: F64.0 ICD-9-CM: 302.85 Vitals BP Pulse Temp Resp Height(growth percentile) Weight(growth percentile) 121/76 (BP 1 Location: Left arm, BP Patient Position: Sitting) 89 98.1 °F (36.7 °C) (Oral) 18 5' 9\" (1.753 m) 261 lb (118.4 kg) LMP SpO2 BMI OB Status Smoking Status 10/19/2017 98% 38.54 kg/m2 Having regular periods Never Smoker BMI and BSA Data Body Mass Index Body Surface Area 38.54 kg/m 2 2.4 m 2 Preferred Pharmacy Pharmacy Name Phone [facility] [address] [place], [place] - [address] [place] [address] Reggie Perkins [phone] Your Updated Medication List  
  
   
This list is accurate as of: 10/20/17 10:01 AM.  Always use your most recent med list.  
  
  
  
  
 ALPRAZolam 0.5 mg tablet Commonly known as:  Humza Sequin Take 1 Tab by mouth two (2) times daily as needed for Anxiety. Max Daily Amount: 1 mg. Indications: ANXIETY WITH DEPRESSION  
  
 fexofenadine 180 mg tablet Commonly known as:  Avonne Day Take 1 Tab by mouth daily. Indications: ALLERGIC RHINITIS  
  
 sertraline 100 mg tablet Commonly known as:  ZOLOFT Take 1 Tab by mouth daily. Indications: ANXIETY WITH DEPRESSION  
  
 VITAMIN D3 1,000 unit Cap Generic drug:  cholecalciferol Take  by mouth daily. Prescriptions Sent to Pharmacy Refills  
 sertraline (ZOLOFT) 100 mg tablet 5 Sig: Take 1 Tab by mouth daily. Indications: ANXIETY WITH DEPRESSION Class: Normal  
 Pharmacy: [facility] [address] [facility], [address] [place] [address] Ph #: [phone] Route: Oral  
  
Follow-up Instructions Return in about 1 month (around 11/20/2017), or if symptoms worsen or fail to improve, for Depression, anxiety. Patient Instructions Depression Treatment: Care Instructions Your Care Instructions Depression is a condition that affects the way you feel, think, and act. It causes symptoms such as low energy, loss of interest in daily activities, and sadness or grouchiness that goes on for a long time. Depression is very common and affects men and women of all ages. Depression is a medical illness caused by changes in the natural chemicals in your brain. It is not a character flaw, and it does not mean that you are a bad or weak person. It does not mean that you are going crazy. It is important to know that depression can be treated. Medicines, counseling, and self-care can all help.  Many people do not get help because they are embarrassed or think that they will get over the depression on their own. But some people do not get better without treatment. Follow-up care is a key part of your treatment and safety. Be sure to make and go to all appointments, and call your doctor if you are having problems. It's also a good idea to know your test results and keep a list of the medicines you take. How can you care for yourself at home? Learn about antidepressant medicines Antidepressant medicines can improve or end the symptoms of depression. You may need to take the medicine for at least 6 months, and often longer. Keep taking your medicine even if you feel better. If you stop taking it too soon, your symptoms may come back or get worse. You may start to feel better within 1 to 3 weeks of taking antidepressant medicine. But it can take as many as 6 to 8 weeks to see more improvement. Talk to your doctor if you have problems with your medicine or if you do not notice any improvement after 3 weeks. Antidepressants can make you feel tired, dizzy, or nervous. Some people have dry mouth, constipation, headaches, sexual problems, an upset stomach, or diarrhea. Many of these side effects are mild and go away on their own after you take the medicine for a few weeks. Some may last longer. Talk to your doctor if side effects bother you too much. You might be able to try a different medicine. If you are pregnant or breastfeeding, talk to your doctor about what medicines you can take. Learn about counseling In many cases, counseling can work as well as medicines to treat mild to moderate depression. Counseling is done by licensed mental health providers, such as psychologists, social workers, and some types of nurses. It can be done in one-on-one sessions or in a group setting. Many people find group sessions helpful. Cognitive-behavioral therapy is a type of counseling.  In this treatment therapy, you learn how to see and change unhelpful thinking styles that may be adding to your depression. Counseling and medicines often work well when used together. To manage depression · Be physically active. Getting 30 minutes of exercise each day is good for your body and your mind. Begin slowly if it is hard for you to get started. If you already exercise, keep it up. · Plan something pleasant for yourself every day. Include activities that you have enjoyed in the past. 
· Get enough sleep. Talk to your doctor if you have problems sleeping. · Eat a balanced diet. If you do not feel hungry, eat small snacks rather than large meals. · Do not drink alcohol, use illegal drugs, or take medicines that your doctor has not prescribed for you. They may interfere with your treatment. · Spend time with family and friends. It may help to speak openly about your depression with people you trust. 
· Take your medicines exactly as prescribed. Call your doctor if you think you are having a problem with your medicine. · Do not make major life decisions while you are depressed. Depression may change the way you think. You will be able to make better decisions after you feel better. · Think positively. Challenge negative thoughts with statements such as \"I am hopeful\"; \"Things will get better\"; and \"I can ask for the help I need. \" Write down these statements and read them often, even if you don't believe them yet. · Be patient with yourself. It took time for your depression to develop, and it will take time for your symptoms to improve. Do not take on too much or be too hard on yourself. · Learn all you can about depression from written and online materials. · Check out behavioral health classes to learn more about dealing with depression. · Keep the numbers for these national suicide hotlines: 0-012-606-TALK (9-307.562.7929) and 1-779-ZTAKAMY (0-120.539.3245).  If you or someone you know talks about suicide or feeling hopeless, get help right away. When should you call for help? Call 911 anytime you think you may need emergency care. For example, call if: 
· You feel you cannot stop from hurting yourself or someone else. Call your doctor now or seek immediate medical care if: 
· You hear voices. · You feel much more depressed. Watch closely for changes in your health, and be sure to contact your doctor if: 
· You are having problems with your depression medicine. · You are not getting better as expected. Where can you learn more? Go to http://william-kory.info/. Enter T089 in the search box to learn more about \"Depression Treatment: Care Instructions. \" Current as of: July 26, 2016 Content Version: 11.3 © 7639-3458 [employer]. Care instructions adapted under license by [employer] (which disclaims liability or warranty for this information). If you have questions about a medical condition or this instruction, always ask your healthcare professional. [place] [address] any warranty or liability for your use of this information. Introducing [facility]! Dear Richmond Camacho: Thank you for requesting a [employer] account. Our records indicate that you already have an active [employer] account. You can access your account anytime at [email or web address] Did you know that you can access your hospital and ER discharge instructions at any time in [employer]? You can also review all of your test results from your hospital stay or ER visit. Additional Information If you have questions, please visit the Frequently Asked Questions section of the [employer] website at [email or web address] Remember, [employer] is NOT to be used for urgent needs. For medical emergencies, dial 911. Now available from your iPhone and Android! Please provide this summary of care documentation to your next provider. Your primary care clinician is listed as Jovana Green. If you have any questions after today's visit, please call [phone].

## 2017-11-13 ENCOUNTER — OFFICE VISIT (OUTPATIENT)
Dept: INTERNAL MEDICINE CLINIC | Age: 22
End: 2017-11-13

## 2017-11-13 VITALS
BODY MASS INDEX: 37.92 KG/M2 | SYSTOLIC BLOOD PRESSURE: 115 MMHG | WEIGHT: 256 LBS | HEIGHT: 69 IN | OXYGEN SATURATION: 98 % | HEART RATE: 88 BPM | RESPIRATION RATE: 16 BRPM | DIASTOLIC BLOOD PRESSURE: 72 MMHG | TEMPERATURE: 99.2 F

## 2017-11-13 DIAGNOSIS — E66.9 OBESITY (BMI 35.0-39.9 WITHOUT COMORBIDITY): ICD-10-CM

## 2017-11-13 DIAGNOSIS — F64.0 GENDER DYSPHORIA IN ADULT: ICD-10-CM

## 2017-11-13 DIAGNOSIS — Z13.220 LIPID SCREENING: ICD-10-CM

## 2017-11-13 DIAGNOSIS — Z13.1 SCREENING FOR DIABETES MELLITUS: ICD-10-CM

## 2017-11-13 DIAGNOSIS — F33.1 MODERATE EPISODE OF RECURRENT MAJOR DEPRESSIVE DISORDER (HCC): Primary | ICD-10-CM

## 2017-11-13 NOTE — PROGRESS NOTES
CC:   Chief Complaint   Patient presents with    Depression    Anxiety       HISTORY OF PRESENT ILLNESS  Viridiana Salazar is a 25 y.o. female. Presents for 1 month follow up evaluation of depression and anxiety. At last clinic, sertraline dose was increased from 50 to 100 mg daily. Today she reports both depression and anxiety better controlled. Has not used Xanax at all. Her mother has been understanding in terms of her plans to become transgender. Her mother will accompany her to an appointment at [facility] on 11/18/17.     Other Providers: Nicolasa De Los Santos ([facility])- next appointment on 11/15/17     Soc Hx  Single. No children. 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 Never smoker. Is not sexually active. Wants to become transgender.      ROS  A complete review of systems was performed and is negative except for those mentioned in the HPI. Patient Active Problem List   Diagnosis Code    Obesity (BMI 35.0-39.9 without comorbidity) E66.9    Moderate episode of recurrent major depressive disorder ([facility] F33.1    Raynaud's phenomenon I73.00    Allergic rhinitis J30.9    Panic disorder F41.0    Gender dysphoria in adult F61.0     Past Medical History:   Diagnosis Date    Migraines 2007    Mild depression ([facility] 3/10/2015    Obesity 2007    RAD (reactive airway disease) 1160,2952    Rhinitis 2005    Allergic & Seasonal     No Known Allergies     Current Outpatient Prescriptions   Medication Sig Dispense Refill    sertraline (ZOLOFT) 100 mg tablet Take 1 Tab by mouth daily. Indications: ANXIETY WITH DEPRESSION 30 Tab 5    ALPRAZolam (XANAX) 0.5 mg tablet Take 1 Tab by mouth two (2) times daily as needed for Anxiety. Max Daily Amount: 1 mg. Indications: ANXIETY WITH DEPRESSION 30 Tab 0    fexofenadine (ALLEGRA) 180 mg tablet Take 1 Tab by mouth daily.  Indications: ALLERGIC RHINITIS 30 Tab 3    Cholecalciferol, Vitamin D3, (VITAMIN D3) 1,000 unit Cap Take by mouth daily. PHYSICAL EXAM  Visit Vitals    /72 (BP 1 Location: Left arm, BP Patient Position: Sitting)    Pulse 88    Temp 99.2 °F (37.3 °C) (Oral)    Resp 16    Ht 5' 9\" (1.753 m)    Wt 256 lb (116.1 kg)    LMP 10/19/2017    SpO2 98%    BMI 37.8 kg/m2       General: Obese, no distress. HEENT:  Head normocephalic/atraumatic, no scleral icterus  Lungs:  Clear to ausculation bilaterally. Good air movement. Heart:  Regular rate and rhythm, normal S1 and S2, no murmur, gallop, or rub  Extremities: No clubbing, cyanosis, or edema. Neurological: Alert and oriented. Psychiatric: Normal mood and affect. Behavior is normal.         ASSESSMENT AND PLAN    ICD-10-CM ICD-9-CM    1. Moderate episode of recurrent major depressive disorder (HCC) Q40.1 728.59 METABOLIC PANEL, COMPREHENSIVE      CBC WITH AUTOMATED DIFF      TSH RFX ON ABNORMAL TO FREE T4   2. Lipid screening Z13.220 V77.91 LIPID PANEL   3. Screening for diabetes mellitus Z13.1 V77.1 HEMOGLOBIN A1C WITH EAG   4. Gender dysphoria in adult F64.0 302.85    5. Obesity (BMI 35.0-39.9 without comorbidity) E66.9 278.00        Diagnoses and all orders for this visit:    1. Moderate episode of recurrent major depressive disorder (HCC)  Controlled on Wellbutrin.  -     METABOLIC PANEL, COMPREHENSIVE  -     CBC WITH AUTOMATED DIFF  -     TSH RFX ON ABNORMAL TO FREE T4    2. Lipid screening  -     LIPID PANEL    3. Screening for diabetes mellitus  -     HEMOGLOBIN A1C WITH EAG    4. Gender dysphoria in adult  To start receiving [facility] injections through [facility]. 5. Obesity (BMI 35.0-39.9 without comorbidity)  Discussed the patient's BMI with her. The BMI follow up plan is as follows: dietary management education, guidance, and counseling; encourage exercise; monitor weight; prescribed dietary intake. Follow up BMI in 6 months. She would like a copy of her labs mailed to her to take to [facility].     Follow-up Disposition:  Return in about 6 months (around 5/13/2018), or if symptoms worsen or fail to improve, for Depression, anxiety. Provided patient and/or family with advanced directive information and answered pertinent questions. Encouraged patient to provide a copy of advanced directive to the office when available. I have discussed the diagnosis with the patient and the intended plan as seen in the above orders. Patient is in agreement. The patient has received an after-visit summary and questions were answered concerning future plans. I have discussed medication side effects and warnings with the patient as well.

## 2017-11-13 NOTE — PROGRESS NOTES
Reviewed record  In preparation for visit and have obtained necessary documentation. 1. Have you been to the ER, urgent care clinic since your last visit? Hospitalized since your last visit?no  2. Have you seen or consulted any other health care providers outside of the [address] [place] [address] since your last visit? Include any pap smears or colon screening. no  Advanced directives: Patient has been given information on advanced directives at a previous visit. Patients vital signs discussed with physician.

## 2017-11-13 NOTE — MR AVS SNAPSHOT
Visit Information Date & Time Provider Department Dept. Phone Encounter #  
 11/13/2017  9:50 AM Daniel Burger, 40 [facility] [phone] [id number] Follow-up Instructions Return in about 6 months (around 5/13/2018), or if symptoms worsen or fail to improve, for Depression, anxiety. Your Appointments 12/20/2017  8:00 AM  
ROUTINE CARE with Daniel Burger MD  
40 [facility]) Appt Note: 1yr f/u; Annual physical exam, fasting labs [address] 100[address] [facility] [address] [phone]  
  
   
 [address] Upcoming Health Maintenance Date Due  
 PAP AKA CERVICAL CYTOLOGY 10/20/2020 DTaP/Tdap/Td series (3 - Td) 7/5/2027 Allergies as of 11/13/2017  Review Complete On: 11/13/2017 By: Daniel Burger MD  
 No Known Allergies Current Immunizations  Reviewed on 4/17/2015 Name Date Human Papillomavirus 10/23/2012, 5/29/2012, 3/27/2012 Influenza Vaccine 10/30/2013 Influenza Vaccine PF 1/9/2013 Meningococcal Vaccine 3/27/2012 TDAP Vaccine 9/19/2006 Not reviewed this visit You Were Diagnosed With   
  
 Codes Comments Moderate episode of recurrent major depressive disorder ([facility]    -  Primary ICD-10-CM: F33.1 ICD-9-CM: 296.32 Lipid screening     ICD-10-CM: A85.052 ICD-9-CM: V77.91 Screening for diabetes mellitus     ICD-10-CM: Z13.1 ICD-9-CM: V77.1 Vitals BP Pulse Temp Resp Height(growth percentile) Weight(growth percentile) 115/72 (BP 1 Location: Left arm, BP Patient Position: Sitting) 88 99.2 °F (37.3 °C) (Oral) 16 5' 9\" (1.753 m) 256 lb (116.1 kg) LMP SpO2 BMI OB Status Smoking Status 10/19/2017 98% 37.8 kg/m2 Having regular periods Never Smoker BMI and BSA Data Body Mass Index Body Surface Area  
 37.8 kg/m 2 2.38 m 2 Preferred Pharmacy Pharmacy Name Phone [facility] [address] [place], [place] - [address] [place] [address] Anika Garcia [phone] Your Updated Medication List  
  
   
This list is accurate as of: 11/13/17 10:09 AM.  Always use your most recent med list.  
  
  
  
  
 ALPRAZolam 0.5 mg tablet Commonly known as:  Sonam Bee Take 1 Tab by mouth two (2) times daily as needed for Anxiety. Max Daily Amount: 1 mg. Indications: ANXIETY WITH DEPRESSION  
  
 fexofenadine 180 mg tablet Commonly known as:  Severiano Currie Take 1 Tab by mouth daily. Indications: ALLERGIC RHINITIS  
  
 sertraline 100 mg tablet Commonly known as:  ZOLOFT Take 1 Tab by mouth daily. Indications: ANXIETY WITH DEPRESSION  
  
 VITAMIN D3 1,000 unit Cap Generic drug:  cholecalciferol Take  by mouth daily. We Performed the Following CBC WITH AUTOMATED DIFF [27094 CPT(R)] HEMOGLOBIN A1C WITH EAG [57553 CPT(R)] LIPID PANEL [50320 CPT(R)] METABOLIC PANEL, COMPREHENSIVE [91349 CPT(R)] TSH RFX ON ABNORMAL TO FREE T4 [[id number] Custom] Follow-up Instructions Return in about 6 months (around 5/13/2018), or if symptoms worsen or fail to improve, for Depression, anxiety. Introducing [facility] & HEALTH SERVICES! Dear Millie Gave: Thank you for requesting a SingleFeed account. Our records indicate that you already have an active SingleFeed account. You can access your account anytime at [email or web address] Did you know that you can access your hospital and ER discharge instructions at any time in SingleFeed? You can also review all of your test results from your hospital stay or ER visit. Additional Information If you have questions, please visit the Frequently Asked Questions section of the SingleFeed website at [email or web address] Remember, SingleFeed is NOT to be used for urgent needs. For medical emergencies, dial 911. Now available from your iPhone and Android! Please provide this summary of care documentation to your next provider. Your primary care clinician is listed as Lalito Gilliland. If you have any questions after today's visit, please call [phone].

## 2017-11-14 LAB
ALBUMIN SERPL-MCNC: 4.4 G/DL (ref 3.5–5.5)
ALBUMIN/GLOB SERPL: 1.4 {RATIO} (ref 1.2–2.2)
ALP SERPL-CCNC: 100 IU/L (ref 39–117)
ALT SERPL-CCNC: 18 IU/L (ref 0–32)
AST SERPL-CCNC: 14 IU/L (ref 0–40)
BASOPHILS # BLD AUTO: 0 X10E3/UL (ref 0–0.2)
BASOPHILS NFR BLD AUTO: 0 %
BILIRUB SERPL-MCNC: 0.4 MG/DL (ref 0–1.2)
BUN SERPL-MCNC: 11 MG/DL (ref 6–20)
BUN/CREAT SERPL: 15 (ref 9–23)
CALCIUM SERPL-MCNC: 9.8 MG/DL (ref 8.7–10.2)
CHLORIDE SERPL-SCNC: 100 MMOL/L (ref 96–106)
CHOLEST SERPL-MCNC: 177 MG/DL (ref 100–199)
CO2 SERPL-SCNC: 24 MMOL/L (ref 18–29)
CREAT SERPL-MCNC: 0.75 MG/DL (ref 0.57–1)
EOSINOPHIL # BLD AUTO: 0.1 X10E3/UL (ref 0–0.4)
EOSINOPHIL NFR BLD AUTO: 1 %
ERYTHROCYTE [DISTWIDTH] IN BLOOD BY AUTOMATED COUNT: 14 % (ref 12.3–15.4)
EST. AVERAGE GLUCOSE BLD GHB EST-MCNC: 94 MG/DL
GFR SERPLBLD CREATININE-BSD FMLA CKD-EPI: 114 ML/MIN/1.73
GFR SERPLBLD CREATININE-BSD FMLA CKD-EPI: 131 ML/MIN/1.73
GLOBULIN SER CALC-MCNC: 3.2 G/DL (ref 1.5–4.5)
GLUCOSE SERPL-MCNC: 81 MG/DL (ref 65–99)
HBA1C MFR BLD: 4.9 % (ref 4.8–5.6)
HCT VFR BLD AUTO: 37.3 % (ref 34–46.6)
HDLC SERPL-MCNC: 43 MG/DL
HGB BLD-MCNC: 12.5 G/DL (ref 11.1–15.9)
IMM GRANULOCYTES # BLD: 0 X10E3/UL (ref 0–0.1)
IMM GRANULOCYTES NFR BLD: 0 %
INTERPRETATION, 910389: NORMAL
LDLC SERPL CALC-MCNC: 122 MG/DL (ref 0–99)
LYMPHOCYTES # BLD AUTO: 2 X10E3/UL (ref 0.7–3.1)
LYMPHOCYTES NFR BLD AUTO: 24 %
MCH RBC QN AUTO: 30.6 PG (ref 26.6–33)
MCHC RBC AUTO-ENTMCNC: 33.5 G/DL (ref 31.5–35.7)
MCV RBC AUTO: 91 FL (ref 79–97)
MONOCYTES # BLD AUTO: 0.4 X10E3/UL (ref 0.1–0.9)
MONOCYTES NFR BLD AUTO: 5 %
NEUTROPHILS # BLD AUTO: 5.8 X10E3/UL (ref 1.4–7)
NEUTROPHILS NFR BLD AUTO: 70 %
PLATELET # BLD AUTO: 325 X10E3/UL (ref 150–379)
POTASSIUM SERPL-SCNC: 4.6 MMOL/L (ref 3.5–5.2)
PROT SERPL-MCNC: 7.6 G/DL (ref 6–8.5)
RBC # BLD AUTO: 4.09 X10E6/UL (ref 3.77–5.28)
SODIUM SERPL-SCNC: 140 MMOL/L (ref 134–144)
TRIGL SERPL-MCNC: 61 MG/DL (ref 0–149)
TSH SERPL DL<=0.005 MIU/L-ACNC: 1.82 UIU/ML (ref 0.45–4.5)
VLDLC SERPL CALC-MCNC: 12 MG/DL (ref 5–40)
WBC # BLD AUTO: 8.3 X10E3/UL (ref 3.4–10.8)

## 2017-11-14 NOTE — PATIENT INSTRUCTIONS
Body Mass Index: Care Instructions  Your Care Instructions    Body mass index (BMI) can help you see if your weight is raising your risk for health problems. It uses a formula to compare how much you weigh with how tall you are. · A BMI lower than 18.5 is considered underweight. · A BMI between 18.5 and 24.9 is considered healthy. · A BMI between 25 and 29.9 is considered overweight. A BMI of 30 or higher is considered obese. If your BMI is in the normal range, it means that you have a lower risk for weight-related health problems. If your BMI is in the overweight or obese range, you may be at increased risk for weight-related health problems, such as high blood pressure, heart disease, stroke, arthritis or joint pain, and diabetes. If your BMI is in the underweight range, you may be at increased risk for health problems such as fatigue, lower protection (immunity) against illness, muscle loss, bone loss, hair loss, and hormone problems. BMI is just one measure of your risk for weight-related health problems. You may be at higher risk for health problems if you are not active, you eat an unhealthy diet, or you drink too much alcohol or use tobacco products. Follow-up care is a key part of your treatment and safety. Be sure to make and go to all appointments, and call your doctor if you are having problems. It's also a good idea to know your test results and keep a list of the medicines you take. How can you care for yourself at home? · Practice healthy eating habits. This includes eating plenty of fruits, vegetables, whole grains, lean protein, and low-fat dairy. · If your doctor recommends it, get more exercise. Walking is a good choice. Bit by bit, increase the amount you walk every day. Try for at least 30 minutes on most days of the week. · Do not smoke. Smoking can increase your risk for health problems. If you need help quitting, talk to your doctor about stop-smoking programs and medicines. These can increase your chances of quitting for good. · Limit alcohol to 2 drinks a day for men and 1 drink a day for women. Too much alcohol can cause health problems. If you have a BMI higher than 25  · Your doctor may do other tests to check your risk for weight-related health problems. This may include measuring the distance around your waist. A waist measurement of more than 40 inches in men or 35 inches in women can increase the risk of weight-related health problems. · Talk with your doctor about steps you can take to stay healthy or improve your health. You may need to make lifestyle changes to lose weight and stay healthy, such as changing your diet and getting regular exercise. If you have a BMI lower than 18.5  · Your doctor may do other tests to check your risk for health problems. · Talk with your doctor about steps you can take to stay healthy or improve your health. You may need to make lifestyle changes to gain or maintain weight and stay healthy, such as getting more healthy foods in your diet and doing exercises to build muscle. Where can you learn more? Go to http://william-kory.info/. Enter S176 in the search box to learn more about \"Body Mass Index: Care Instructions. \"  Current as of: October 13, 2016  Content Version: 11.4  © 0564-2064 Healthwise, Incorporated. Care instructions adapted under license by [employer] ([employer]) (which disclaims liability or warranty for this information). If you have questions about a medical condition or this instruction, always ask your healthcare professional. [address] any warranty or liability for your use of this information.

## 2017-11-15 NOTE — PROGRESS NOTES
All your labs returned normal. This includes normal kidney and liver tests, blood counts, thyroid, diabetes screening test, and total cholesterol. Keep up the good work! Dr. Renuka Pichardo remembers that you requested a copy of your labs results. You can download a copy of your lab results through My Chart or we can mail you a copy.

## 2017-11-28 ENCOUNTER — TELEPHONE (OUTPATIENT)
Dept: INTERNAL MEDICINE CLINIC | Age: 22
End: 2017-11-28

## 2017-11-28 NOTE — TELEPHONE ENCOUNTER
Mother/pateint notified of dr Андрей Chao note and advised they can  letter for patient missing school today

## 2017-11-28 NOTE — TELEPHONE ENCOUNTER
Patient reports increase of zoloft has helped but she had a break through depressive state today at 1am. Patient was very anxious and reports it felt like a mental breakdown. Patient is scared of taking xanax and will not see therapist until tomorrow. Reviewed coping strategies with patient and educated about xanax. Informed it was ok to take if she knew she was not going to drive, do not take with alcohol or other illicit drugs, and she could try 1/2 of the dose to start if she was anxious about trying it. Patient would like to know if zoloft should be increased or changed r/t this breakthrough depression this morning. Patient denies current SI/HI at this time.

## 2017-11-28 NOTE — TELEPHONE ENCOUNTER
----- Message from Rondal Fleischer sent at 11/28/2017  9:21 AM EST -----  Regarding: Dr. Rivera Read Telephone  Patient's mother, Jann Scheuermann, would like a call back to [phone] or [phone].   Patient had a bad anxiety attack last night and mother wants to know if medications should be adjusted or if this is normal.

## 2017-12-22 ENCOUNTER — OFFICE VISIT (OUTPATIENT)
Dept: INTERNAL MEDICINE CLINIC | Age: 22
End: 2017-12-22

## 2017-12-22 VITALS
HEART RATE: 86 BPM | DIASTOLIC BLOOD PRESSURE: 72 MMHG | RESPIRATION RATE: 16 BRPM | TEMPERATURE: 98.4 F | OXYGEN SATURATION: 98 % | WEIGHT: 258 LBS | BODY MASS INDEX: 38.21 KG/M2 | HEIGHT: 69 IN | SYSTOLIC BLOOD PRESSURE: 109 MMHG

## 2017-12-22 DIAGNOSIS — F41.1 GENERALIZED ANXIETY DISORDER: ICD-10-CM

## 2017-12-22 DIAGNOSIS — F33.1 MODERATE EPISODE OF RECURRENT MAJOR DEPRESSIVE DISORDER (HCC): Primary | ICD-10-CM

## 2017-12-22 RX ORDER — BUPROPION HYDROCHLORIDE 150 MG/1
150 TABLET, EXTENDED RELEASE ORAL 2 TIMES DAILY
Qty: 60 TAB | Refills: 3 | Status: SHIPPED | OUTPATIENT
Start: 2017-12-22 | End: 2018-04-30 | Stop reason: ALTCHOICE

## 2017-12-22 RX ORDER — TESTOSTERONE CYPIONATE 200 MG/ML
INJECTION INTRAMUSCULAR
Refills: 0 | COMMUNITY
Start: 2017-11-22 | End: 2020-01-21 | Stop reason: ALTCHOICE

## 2017-12-22 RX ORDER — BUPROPION HYDROCHLORIDE 150 MG/1
TABLET, EXTENDED RELEASE ORAL
Qty: 60 TAB | Refills: 0 | Status: SHIPPED | OUTPATIENT
Start: 2017-12-22 | End: 2018-04-30 | Stop reason: ALTCHOICE

## 2017-12-22 NOTE — MR AVS SNAPSHOT
Visit Information Date & Time Provider Department Dept. Phone Encounter #  
 12/22/2017  7:45 AM Brandy Salmon [phone] [id number] Follow-up Instructions Return in about 2 months (around 2/22/2018), or if symptoms worsen or fail to improve, for Depression and anxiety. Your Appointments 5/22/2018  8:30 AM  
ESTABLISHED PATIENT with MD Brandy Salmon [facility] Appt Note: 6mth f/u; Depression, anxiety [address] [facility] [address] [phone]  
  
   
 [address] [facility] [address] [facility] [address] Upcoming Health Maintenance Date Due  
 PAP AKA CERVICAL CYTOLOGY 10/20/2020 DTaP/Tdap/Td series (3 - Td) 7/5/2027 Allergies as of 12/22/2017  Review Complete On: 12/22/2017 By: Janny Morales MD  
 No Known Allergies Current Immunizations  Reviewed on 4/17/2015 Name Date Human Papillomavirus 10/23/2012, 5/29/2012, 3/27/2012 Influenza Vaccine 10/30/2013 Influenza Vaccine PF 1/9/2013 Meningococcal Vaccine 3/27/2012 TDAP Vaccine 9/19/2006 Not reviewed this visit You Were Diagnosed With   
  
 Codes Comments Moderate episode of recurrent major depressive disorder ([facility]    -  Primary ICD-10-CM: F33.1 ICD-9-CM: 296.32 Generalized anxiety disorder     ICD-10-CM: F41.1 ICD-9-CM: 300.02 Vitals BP Pulse Temp Resp Height(growth percentile) Weight(growth percentile) 109/72 (BP 1 Location: Left arm, BP Patient Position: Sitting) 86 98.4 °F (36.9 °C) (Oral) 16 5' 9\" (1.753 m) 258 lb (117 kg) LMP SpO2 BMI OB Status Smoking Status 11/19/2017 98% 38.1 kg/m2 Having regular periods Never Smoker BMI and BSA Data Body Mass Index Body Surface Area  
 38.1 kg/m 2 2.39 m 2 Preferred Pharmacy Pharmacy Name Phone [facility] [address] [place], [place] - [address] [place] [address] [phone] Your Updated Medication List  
  
   
This list is accurate as of: 12/22/17  8:01 AM.  Always use your most recent med list.  
  
  
  
  
 ALPRAZolam 0.5 mg tablet Commonly known as:  Melene Reels Take 1 Tab by mouth two (2) times daily as needed for Anxiety. Max Daily Amount: 1 mg. Indications: ANXIETY WITH DEPRESSION  
  
 * buPROPion  mg SR tablet Commonly known as:  Friddnimesh Christine Take 1 tab by mouth daily for 7 days. Then increase to 1 tab twice daily. Stay on this dose. Indications: ANXIETY WITH DEPRESSION  
  
 * buPROPion  mg SR tablet Commonly known as:  Friddie Christine Take 1 Tab by mouth two (2) times a day. Indications: ANXIETY WITH DEPRESSION  
  
 fexofenadine 180 mg tablet Commonly known as:  Jordan Kb Take 1 Tab by mouth daily. Indications: ALLERGIC RHINITIS  
  
 sertraline 100 mg tablet Commonly known as:  ZOLOFT Take 1 Tab by mouth daily. Indications: ANXIETY WITH DEPRESSION  
  
 testosterone cypionate 200 mg/mL injection Commonly known as:  DEPOTESTOTERONE CYPIONATE INJECT 0.25 ML INTO THE MUSCLE Q WEEK  
  
 VITAMIN D3 1,000 unit Cap Generic drug:  cholecalciferol Take  by mouth daily. * Notice: This list has 2 medication(s) that are the same as other medications prescribed for you. Read the directions carefully, and ask your doctor or other care provider to review them with you. Prescriptions Printed Refills buPROPion SR (WELLBUTRIN SR) 150 mg SR tablet 3 Sig: Take 1 Tab by mouth two (2) times a day. Indications: ANXIETY WITH DEPRESSION Class: Print Route: Oral  
  
Prescriptions Sent to Pharmacy Refills buPROPion SR (WELLBUTRIN SR) 150 mg SR tablet 0 Sig: Take 1 tab by mouth daily for 7 days. Then increase to 1 tab twice daily. Stay on this dose. Indications: ANXIETY WITH DEPRESSION Class: Normal  
 Pharmacy: [address], [address] [place] [address] #: [phone] Follow-up Instructions Return in about 2 months (around 2/22/2018), or if symptoms worsen or fail to improve, for Depression and anxiety. Introducing [facility] & HEALTH SERVICES! Dear Ambar Rehman: Thank you for requesting a OmniGuide account. Our records indicate that you already have an active OmniGuide account. You can access your account anytime at [email or web address] Did you know that you can access your hospital and ER discharge instructions at any time in OmniGuide? You can also review all of your test results from your hospital stay or ER visit. Additional Information If you have questions, please visit the Frequently Asked Questions section of the OmniGuide website at [email or web address] Remember, OmniGuide is NOT to be used for urgent needs. For medical emergencies, dial 911. Now available from your iPhone and Android! Please provide this summary of care documentation to your next provider. Your primary care clinician is listed as Socorro Ramos. If you have any questions after today's visit, please call [phone].

## 2017-12-22 NOTE — PROGRESS NOTES
Reviewed record  In preparation for visit and have obtained necessary documentation. 1. Have you been to the ER, urgent care clinic since your last visit? Hospitalized since your last visit?no  2. Have you seen or consulted any other health care providers outside of the [address] [place] [address] since your last visit? Include any pap smears or colon screening. [facility] for testosterone  Advanced directives: Patient has been given information on advanced directives at a previous visit. Patients vital signs discussed with physician.

## 2018-02-19 ENCOUNTER — TELEPHONE (OUTPATIENT)
Dept: INTERNAL MEDICINE CLINIC | Age: 23
End: 2018-02-19

## 2018-02-19 ENCOUNTER — DOCUMENTATION ONLY (OUTPATIENT)
Dept: INTERNAL MEDICINE CLINIC | Age: 23
End: 2018-02-19

## 2018-02-19 NOTE — TELEPHONE ENCOUNTER
COPY of letter sent via My Chart on 2/8/18:    Fox Bobby,     As you know I'm transgender and currently on Tesosterone. I have a consultation on the 20th with a surgeon to have top surgery (removing the breasts). For insurance purposes, I need a letter to confirm that I have gender dysphoria, and that it's important to have this surgery. Please let me know how I can go about getting a letter from Dr. Mehrdad Lemus.

## 2018-02-19 NOTE — TELEPHONE ENCOUNTER
Pt's mother is calling about the pt's message she sent to Dr Latasha Pelaez regarding a letter. She would like to know when it could be completed. Let pt's mother know this is Dr Renetta Cope first day back after 6 weeks of leave and it may be delayed in response. Pt's mother can be reached at [phone].

## 2018-04-16 ENCOUNTER — TELEPHONE (OUTPATIENT)
Dept: INTERNAL MEDICINE CLINIC | Facility: CLINIC | Age: 23
End: 2018-04-16

## 2018-04-16 NOTE — TELEPHONE ENCOUNTER
----- Message from Jack Ritter sent at 4/16/2018 10:32 AM EDT -----  Regarding: Dr. Bailey Aguilera Telephone   :  Tanika Fernandez (mom) calling requesting a call back regarding the pt wanting a stronger medication for her allergies. Pt is at work so mom has permission to speak with someone.  Best contact [phone]

## 2018-04-17 ENCOUNTER — TELEPHONE (OUTPATIENT)
Dept: INTERNAL MEDICINE CLINIC | Facility: CLINIC | Age: 23
End: 2018-04-17

## 2018-04-17 RX ORDER — LEVOCETIRIZINE DIHYDROCHLORIDE 5 MG/1
5 TABLET, FILM COATED ORAL DAILY
Qty: 30 TAB | Refills: 3 | Status: SHIPPED | OUTPATIENT
Start: 2018-04-17 | End: 2018-04-30 | Stop reason: ALTCHOICE

## 2018-04-17 NOTE — TELEPHONE ENCOUNTER
Spoke to patients mother (on HIPPA). A message was left yesterday requesting more information but patients mother denies receiving message. Patient is having increased sneezing,itching and coughing. Patient has allergy eye drops that are working for eyes(she did not know the name of this medication) and is taking allegra which is not helping and is requesting an alternative. Please send to [facility].

## 2018-04-17 NOTE — TELEPHONE ENCOUNTER
I sent prescription for Xyzal, but it is available OTC and insurance may not cover it.   I also recommend she use OTC saline nasal spray twice daily

## 2018-04-17 NOTE — TELEPHONE ENCOUNTER
Per Serafin:     Dr. Jeffries Body Telephone  Received:  Today       Connie [address] Office                     Patient's mother, Edie Marier, would like a call back back to [phone].  Patient would like to know what else she can take for allergies besides Allegra.  She is sneezing a lot and itching.  She called yesterday but has not heard back.

## 2018-04-30 ENCOUNTER — OFFICE VISIT (OUTPATIENT)
Dept: INTERNAL MEDICINE CLINIC | Facility: CLINIC | Age: 23
End: 2018-04-30

## 2018-04-30 ENCOUNTER — TELEPHONE (OUTPATIENT)
Dept: INTERNAL MEDICINE CLINIC | Facility: CLINIC | Age: 23
End: 2018-04-30

## 2018-04-30 VITALS
RESPIRATION RATE: 18 BRPM | HEART RATE: 89 BPM | BODY MASS INDEX: 39.55 KG/M2 | DIASTOLIC BLOOD PRESSURE: 82 MMHG | OXYGEN SATURATION: 96 % | HEIGHT: 69 IN | SYSTOLIC BLOOD PRESSURE: 126 MMHG | TEMPERATURE: 98.8 F | WEIGHT: 267 LBS

## 2018-04-30 DIAGNOSIS — H10.9 BACTERIAL CONJUNCTIVITIS OF LEFT EYE: Primary | ICD-10-CM

## 2018-04-30 RX ORDER — CIPROFLOXACIN HYDROCHLORIDE 3.5 MG/ML
SOLUTION/ DROPS TOPICAL
Qty: 2.5 ML | Refills: 0 | Status: SHIPPED | OUTPATIENT
Start: 2018-04-30 | End: 2018-05-22 | Stop reason: ALTCHOICE

## 2018-04-30 NOTE — TELEPHONE ENCOUNTER
Pt has mucus in her eyes and they are red.  Pt wanted to know if she could speak with dr Danielle Low to get a recommendation to an Allergist.

## 2018-04-30 NOTE — MR AVS SNAPSHOT
[address] [address] [phone] Patient: Erik Brooke MRN: [id number] :1995 Visit Information Date & Time Provider Department Dept. Phone Encounter #  
 2018  3:20 PM [facility] of [address] [place] [address] [id number] Follow-up Instructions Return if symptoms worsen or fail to improve, for Scheduled appt on 18. Your Appointments 2018  8:30 AM  
ESTABLISHED PATIENT with MD Ananda Wu Mcmahan [facility]) Appt Note: 6mth f/u; Depression, anxiety; 6mth f/u; Depression, anxiety [address] [phone]  
  
   
 [address] [facility] Upcoming Health Maintenance Date Due Influenza Age 5 to Adult 2018 PAP AKA CERVICAL CYTOLOGY 10/20/2020 DTaP/Tdap/Td series (3 - Td) 2027 Allergies as of 2018  Review Complete On: 2018 By: Kraig Ortega MD  
 No Known Allergies Current Immunizations  Reviewed on 2015 Name Date Human Papillomavirus 10/23/2012, 2012, 3/27/2012 Influenza Vaccine 10/30/2013 Influenza Vaccine PF 2013 Meningococcal Vaccine 3/27/2012 TDAP Vaccine 2006 Not reviewed this visit You Were Diagnosed With   
  
 Codes Comments Bacterial conjunctivitis of left eye    -  Primary ICD-10-CM: H10.9 ICD-9-CM: 372.39, 041.9 Vitals BP Pulse Temp Resp Height(growth percentile) Weight(growth percentile) 126/82 (BP 1 Location: Left arm, BP Patient Position: Sitting) 89 98.8 °F (37.1 °C) 18 5' 9\" (1.753 m) 267 lb (121.1 kg) LMP SpO2 BMI OB Status Smoking Status 2018 96% 39.43 kg/m2 Having regular periods Never Smoker Vitals History BMI and BSA Data Body Mass Index Body Surface Area  
 39.43 kg/m 2 2.43 m 2 Preferred Pharmacy Pharmacy Name Phone [facility] [address] [facility], [address] [place] [phone] Your Updated Medication List  
  
   
This list is accurate as of 4/30/18  3:46 PM.  Always use your most recent med list.  
  
  
  
  
 ALPRAZolam 0.5 mg tablet Commonly known as:  Mahnaz Soriano Take 1 Tab by mouth two (2) times daily as needed for Anxiety. Max Daily Amount: 1 mg. Indications: ANXIETY WITH DEPRESSION  
  
 ciprofloxacin HCl 0.3 % ophthalmic solution Commonly known as:  Jordan Tahira Use 1 drop to left eye every 2 hrs while awake for 2 days then every 4 hrs while awake for the next 5 days  
  
 testosterone cypionate 200 mg/mL injection Commonly known as:  DEPOTESTOTERONE CYPIONATE INJECT 0.5 ML INTO THE MUSCLE Q WEEK  
  
 VITAMIN D3 1,000 unit Cap Generic drug:  cholecalciferol Take  by mouth daily. Prescriptions Sent to Pharmacy Refills  
 ciprofloxacin HCl (CILOXAN) 0.3 % ophthalmic solution 0 Sig: Use 1 drop to left eye every 2 hrs while awake for 2 days then every 4 hrs while awake for the next 5 days Class: Normal  
 Pharmacy: [facility] DR RAFI CORBETT [address] [facility], [address] [place] [address] #: [phone] Follow-up Instructions Return if symptoms worsen or fail to improve, for Scheduled appt on 5/22/18. Introducing [facility] & HEALTH SERVICES! Dear Hanna Valle: Thank you for requesting a Inventergy account. Our records indicate that you already have an active Inventergy account. You can access your account anytime at [email or web address] Links Did you know that you can access your hospital and ER discharge instructions at any time in Inventergy? You can also review all of your test results from your hospital stay or ER visit. Additional Information If you have questions, please visit the Frequently Asked Questions section of the Inventergy website at [email or web address] Remember, Siterrahart is NOT to be used for urgent needs. For medical emergencies, dial 911. Now available from your iPhone and Android! Please provide this summary of care documentation to your next provider. Your primary care clinician is listed as Libertad Pierre. If you have any questions after today's visit, please call [phone].

## 2018-04-30 NOTE — PROGRESS NOTES
Escobar Aanya  Identified pt with two pt identifiers(name and ). Chief Complaint   Patient presents with    Eye Problem     left eye/drainage       1. Have you been to the ER, urgent care clinic since your last visit? Hospitalized since your last visit? NO    2. Have you seen or consulted any other health care providers outside of the [facility] since your last visit? Include any pap smears or colon screening. Dr Margarita Miguel(psych)     scheduled for surgery 2018 bilateral mascetomy    Today's provider has been notified of reason for visit, vitals and flowsheets obtained on patients. Patient received paperwork for advance directive during previous visit but has not completed at this time     Reviewed record In preparation for visit, huddled with provider and have obtained necessary documentation      There are no preventive care reminders to display for this patient.     Wt Readings from Last 3 Encounters:   18 267 lb (121.1 kg)   17 258 lb (117 kg)   17 256 lb (116.1 kg)     Temp Readings from Last 3 Encounters:   18 98.8 °F (37.1 °C)   17 98.4 °F (36.9 °C) (Oral)   17 99.2 °F (37.3 °C) (Oral)     BP Readings from Last 3 Encounters:   18 126/82   17 109/72   17 115/72     Pulse Readings from Last 3 Encounters:   18 89   17 86   17 88     Vitals:    18 1520   BP: 126/82   Pulse: 89   Resp: 18   Temp: 98.8 °F (37.1 °C)   SpO2: 96%   Weight: 267 lb (121.1 kg)   Height: 5' 9\" (1.753 m)   PainSc:   5   LMP: 2018         Learning Assessment:  :     Learning Assessment 3/10/2015   PRIMARY LEARNER Patient   HIGHEST LEVEL OF EDUCATION - PRIMARY LEARNER  2 YEARS OF COLLEGE   BARRIERS PRIMARY LEARNER NONE   CO-LEARNER CAREGIVER No   PRIMARY LANGUAGE ENGLISH   LEARNER PREFERENCE PRIMARY LISTENING   ANSWERED BY patient   RELATIONSHIP SELF       Depression Screening:  :     PHQ over the last two weeks 3/10/2015 Little interest or pleasure in doing things Nearly every day   Feeling down, depressed or hopeless Nearly every day   Total Score PHQ 2 6   Trouble falling or staying asleep, or sleeping too much Not at all   Feeling tired or having little energy Several days   Poor appetite or overeating Not at all   Feeling bad about yourself - or that you are a failure or have let yourself or your family down Not at all   Trouble concentrating on things such as school, work, reading or watching TV Not at all   Moving or speaking so slowly that other people could have noticed; or the opposite being so fidgety that others notice Several days   Thoughts of being better off dead, or hurting yourself in some way Not at all   PHQ 9 Score 8   How difficult have these problems made it for you to do your work, take care of your home and get along with others Somewhat difficult       Fall Risk Assessment:  :     No flowsheet data found. Abuse Screening:  :     No flowsheet data found. ADL Screening:  :     ADL Assessment 3/10/2015   Feeding yourself No Help Needed   Getting from bed to chair No Help Needed   Getting dressed No Help Needed   Bathing or showering No Help Needed   Walk across the room (includes cane/walker) No Help Needed   Using the telphone No Help Needed   Taking your medications No Help Needed   Preparing meals No Help Needed   Managing money (expenses/bills) No Help Needed   Moderately strenuous housework (laundry) No Help Needed   Shopping for personal items (toiletries/medicines) No Help Needed   Shopping for groceries No Help Needed   Driving No Help Needed   Climbing a flight of stairs No Help Needed   Getting to places beyond walking distances No Help Needed                 Medication reconciliation up to date and corrected with patient at this time.

## 2018-04-30 NOTE — PROGRESS NOTES
CC:   Chief Complaint   Patient presents with    Eye Problem     left eye/drainage       HISTORY OF PRESENT ILLNESS  Merle Rico is a 21 y.o. female. Patient complains of left eye drainage for past month that worsened yesterday. Initially clear-colored drainage. Starting yesterday having purulent drainage, eye redness, crusting of right eyelids in the morning, mild eye discomfort, no visual changes. Follows with Dr. Leah Little (Psychology); receiving testosterone injections. Is now in a transgender support group. Scheduled for bilateral mastectomy on 9/26/18. Patient Active Problem List   Diagnosis Code    Obesity (BMI 35.0-39.9 without comorbidity) E66.9    Moderate episode of recurrent major depressive disorder ([facility] F33.1    Raynaud's phenomenon I73.00    Allergic rhinitis J30.9    Panic disorder F41.0    Gender dysphoria in adult F61.0     Past Medical History:   Diagnosis Date    Migraines 2007    Mild depression ([facility] [address] 3/10/2015    Obesity 2007    RAD (reactive airway disease) 3092,0610    Rhinitis 2005    Allergic & Seasonal     No Known Allergies    Current Outpatient Prescriptions   Medication Sig Dispense Refill    testosterone cypionate (DEPOTESTOTERONE CYPIONATE) 200 mg/mL injection INJECT 0.5 ML INTO THE MUSCLE Q WEEK  0    ALPRAZolam (XANAX) 0.5 mg tablet Take 1 Tab by mouth two (2) times daily as needed for Anxiety. Max Daily Amount: 1 mg. Indications: ANXIETY WITH DEPRESSION 30 Tab 0    Cholecalciferol, Vitamin D3, (VITAMIN D3) 1,000 unit Cap Take  by mouth daily. PHYSICAL EXAM  Visit Vitals    /82 (BP 1 Location: Left arm, BP Patient Position: Sitting)    Pulse 89    Temp 98.8 °F (37.1 °C)    Resp 18    Ht 5' 9\" (1.753 m)    Wt 267 lb (121.1 kg)    LMP 01/30/2018    SpO2 96%    BMI 39.43 kg/m2       General: Obese, no distress. HEENT:  Head normocephalic/atraumatic, no scleral icterus.  Mild right eye conjunctival injection with mild right upper lid edema. Trace drainage at medial aspect of eye. Neurological: Alert and oriented. Psychiatric: Normal mood and affect. Behavior is normal.         ASSESSMENT AND PLAN    ICD-10-CM ICD-9-CM    1. Bacterial conjunctivitis of left eye H10.9 372.39 ciprofloxacin HCl (CILOXAN) 0.3 % ophthalmic solution     041. 9        Diagnoses and all orders for this visit:    1. Bacterial conjunctivitis of left eye  -     Start ciprofloxacin HCl (CILOXAN) 0.3 % ophthalmic solution; Use 1 drop to left eye every 2 hrs while awake for 2 days then every 4 hrs while awake for the next 5 days      Follow-up Disposition:  Return if symptoms worsen or fail to improve, for Scheduled appt on 5/22/18. Provided patient and/or family with advanced directive information and answered pertinent questions. Encouraged patient to provide a copy of advanced directive to the office when available. I have discussed the diagnosis with the patient and the intended plan as seen in the above orders. Patient is in agreement. The patient has received an after-visit summary and questions were answered concerning future plans. I have discussed medication side effects and warnings with the patient as well.

## 2018-05-22 ENCOUNTER — OFFICE VISIT (OUTPATIENT)
Dept: INTERNAL MEDICINE CLINIC | Facility: CLINIC | Age: 23
End: 2018-05-22

## 2018-05-22 VITALS
DIASTOLIC BLOOD PRESSURE: 79 MMHG | TEMPERATURE: 98.7 F | WEIGHT: 262 LBS | HEIGHT: 69 IN | HEART RATE: 82 BPM | BODY MASS INDEX: 38.8 KG/M2 | OXYGEN SATURATION: 98 % | SYSTOLIC BLOOD PRESSURE: 117 MMHG | RESPIRATION RATE: 18 BRPM

## 2018-05-22 DIAGNOSIS — E66.9 OBESITY (BMI 35.0-39.9 WITHOUT COMORBIDITY): ICD-10-CM

## 2018-05-22 DIAGNOSIS — F33.1 MODERATE EPISODE OF RECURRENT MAJOR DEPRESSIVE DISORDER (HCC): Primary | ICD-10-CM

## 2018-05-22 DIAGNOSIS — J30.89 NON-SEASONAL ALLERGIC RHINITIS, UNSPECIFIED TRIGGER: ICD-10-CM

## 2018-05-22 DIAGNOSIS — F41.0 PANIC DISORDER: ICD-10-CM

## 2018-05-22 DIAGNOSIS — F64.0 GENDER DYSPHORIA IN ADULT: ICD-10-CM

## 2018-05-22 DIAGNOSIS — Z00.00 ROUTINE GENERAL MEDICAL EXAMINATION AT A HEALTH CARE FACILITY: ICD-10-CM

## 2018-05-22 RX ORDER — QUETIAPINE FUMARATE 50 MG/1
50 TABLET, FILM COATED ORAL
Status: ON HOLD | COMMUNITY
Start: 2018-05-09 | End: 2018-09-06

## 2018-05-22 RX ORDER — DESVENLAFAXINE SUCCINATE 50 MG/1
50 TABLET, EXTENDED RELEASE ORAL DAILY
COMMUNITY
Start: 2018-05-09 | End: 2020-01-21 | Stop reason: SDUPTHER

## 2018-05-22 NOTE — PROGRESS NOTES
Cristofersherman Merari  Identified pt with two pt identifiers(name and ). Chief Complaint   Patient presents with    Depression    Anxiety       1. Have you been to the ER, urgent care clinic since your last visit? Hospitalized since your last visit? NO    2. Have you seen or consulted any other health care providers outside of the [address] [place] [address] since your last visit? Include any pap smears or colon screening. NO    Today's provider has been notified of reason for visit, vitals and flowsheets obtained on patients. Patient received paperwork for advance directive during previous visit but has not completed at this time     Reviewed record In preparation for visit, huddled with provider and have obtained necessary documentation      There are no preventive care reminders to display for this patient.     Wt Readings from Last 3 Encounters:   18 262 lb (118.8 kg)   18 267 lb (121.1 kg)   17 258 lb (117 kg)     Temp Readings from Last 3 Encounters:   18 98.7 °F (37.1 °C) (Oral)   18 98.8 °F (37.1 °C)   17 98.4 °F (36.9 °C) (Oral)     BP Readings from Last 3 Encounters:   18 117/79   18 126/82   17 109/72     Pulse Readings from Last 3 Encounters:   18 82   18 89   17 86     Vitals:    18 0824   BP: 117/79   Pulse: 82   Resp: 18   Temp: 98.7 °F (37.1 °C)   TempSrc: Oral   SpO2: 98%   Weight: 262 lb (118.8 kg)   Height: 5' 9\" (1.753 m)   PainSc:   0 - No pain         Learning Assessment:  :     Learning Assessment 3/10/2015   PRIMARY LEARNER Patient   HIGHEST LEVEL OF EDUCATION - PRIMARY LEARNER  2 YEARS OF COLLEGE   BARRIERS PRIMARY LEARNER NONE   CO-LEARNER CAREGIVER No   PRIMARY LANGUAGE ENGLISH   LEARNER PREFERENCE PRIMARY LISTENING   ANSWERED BY patient   RELATIONSHIP SELF       Depression Screening:  :     PHQ over the last two weeks 3/10/2015   Little interest or pleasure in doing things Nearly every day   Feeling down, depressed or hopeless Nearly every day   Total Score PHQ 2 6   Trouble falling or staying asleep, or sleeping too much Not at all   Feeling tired or having little energy Several days   Poor appetite or overeating Not at all   Feeling bad about yourself - or that you are a failure or have let yourself or your family down Not at all   Trouble concentrating on things such as school, work, reading or watching TV Not at all   Moving or speaking so slowly that other people could have noticed; or the opposite being so fidgety that others notice Several days   Thoughts of being better off dead, or hurting yourself in some way Not at all   PHQ 9 Score 8   How difficult have these problems made it for you to do your work, take care of your home and get along with others Somewhat difficult       Fall Risk Assessment:  :     No flowsheet data found. Abuse Screening:  :     No flowsheet data found. ADL Screening:  :     ADL Assessment 4/30/2018   Feeding yourself No Help Needed   Getting from bed to chair No Help Needed   Getting dressed No Help Needed   Bathing or showering No Help Needed   Walk across the room (includes cane/walker) No Help Needed   Using the telphone No Help Needed   Taking your medications No Help Needed   Preparing meals No Help Needed   Managing money (expenses/bills) No Help Needed   Moderately strenuous housework (laundry) No Help Needed   Shopping for personal items (toiletries/medicines) No Help Needed   Shopping for groceries No Help Needed   Driving No Help Needed   Climbing a flight of stairs No Help Needed   Getting to places beyond walking distances No Help Needed                 Medication reconciliation up to date and corrected with patient at this time.

## 2018-05-22 NOTE — PATIENT INSTRUCTIONS

## 2018-05-22 NOTE — MR AVS SNAPSHOT
[address] [place] [address] [phone] Patient: Cedric Lake MRN: [id number] :1995 Visit Information Date & Time Provider Department Dept. Phone Encounter #  
 2018  8:30 AM [facility] [address] [id number] Follow-up Instructions Return in about 6 months (around 2018), or if symptoms worsen or fail to improve, for Depresion, anxiety, weight. Upcoming Health Maintenance Date Due Influenza Age 5 to Adult 2018 PAP AKA CERVICAL CYTOLOGY 10/20/2020 DTaP/Tdap/Td series (3 - Td) 2027 Allergies as of 2018  Review Complete On: 2018 By: Mercy Roa MD  
 No Known Allergies Current Immunizations  Reviewed on 2015 Name Date Human Papillomavirus 10/23/2012, 2012, 3/27/2012 Influenza Vaccine 10/30/2013 Influenza Vaccine PF 2013 Meningococcal Vaccine 3/27/2012 TDAP Vaccine 2006 Not reviewed this visit You Were Diagnosed With   
  
 Codes Comments Moderate episode of recurrent major depressive disorder [facility]    -  Primary ICD-10-CM: F33.1 ICD-9-CM: 296.32 Routine general medical examination at a health care facility     ICD-10-CM: Z00.00 ICD-9-CM: V70.0 Non-seasonal allergic rhinitis, unspecified trigger     ICD-10-CM: J30.89 ICD-9-CM: 477.8 Panic disorder     ICD-10-CM: F41.0 ICD-9-CM: 300.01 Gender dysphoria in adult     ICD-10-CM: F64.0 ICD-9-CM: 302.85 Obesity (BMI 35.0-39.9 without comorbidity)     ICD-10-CM: B39.1 ICD-9-CM: 278.00 Vitals BP Pulse Temp Resp Height(growth percentile) Weight(growth percentile) 117/79 (BP 1 Location: Right arm, BP Patient Position: Sitting) 82 98.7 °F (37.1 °C) (Oral) 18 5' 9\" (1.753 m) 262 lb (118.8 kg) SpO2 BMI OB Status Smoking Status 98% 38.69 kg/m2 Having regular periods Never Smoker BMI and BSA Data Body Mass Index Body Surface Area  
 38.69 kg/m 2 2.4 m 2 Preferred Pharmacy Pharmacy Name Phone [facility] [address] [phone] Your Updated Medication List  
  
   
This list is accurate as of 5/22/18  8:44 AM.  Always use your most recent med list.  
  
  
  
  
 ALPRAZolam 0.5 mg tablet Commonly known as:  Will Torito Take 1 Tab by mouth two (2) times daily as needed for Anxiety. Max Daily Amount: 1 mg. Indications: ANXIETY WITH DEPRESSION Desvenlafaxine 100 mg Tb24 Take 100 mg by mouth daily. QUEtiapine 50 mg tablet Commonly known as:  SEROquel Take 50 mg by mouth nightly as needed. testosterone cypionate 200 mg/mL injection Commonly known as:  DEPOTESTOTERONE CYPIONATE INJECT 0.5 ML INTO THE MUSCLE Q WEEK  
  
 VITAMIN D3 1,000 unit Cap Generic drug:  cholecalciferol Take  by mouth daily. We Performed the Following CBC WITH AUTOMATED DIFF [54108 CPT(R)] HEMOGLOBIN A1C WITH EAG [51353 CPT(R)] LIPID PANEL [91009 CPT(R)] METABOLIC PANEL, COMPREHENSIVE [49203 CPT(R)] TSH RFX ON ABNORMAL TO FREE T4 [[id number] Custom] Follow-up Instructions Return in about 6 months (around 11/22/2018), or if symptoms worsen or fail to improve, for Depresion, anxiety, weight. Patient Instructions Well Visit, Ages 25 to 48: Care Instructions Your Care Instructions Physical exams can help you stay healthy. Your doctor has checked your overall health and may have suggested ways to take good care of yourself. He or she also may have recommended tests. At home, you can help prevent illness with healthy eating, regular exercise, and other steps. Follow-up care is a key part of your treatment and safety. Be sure to make and go to all appointments, and call your doctor if you are having problems.  It's also a good idea to know your test results and keep a list of the medicines you take. How can you care for yourself at home? · Reach and stay at a healthy weight. This will lower your risk for many problems, such as obesity, diabetes, heart disease, and high blood pressure. · Get at least 30 minutes of physical activity on most days of the week. Walking is a good choice. You also may want to do other activities, such as running, swimming, cycling, or playing tennis or team sports. Discuss any changes in your exercise program with your doctor. · Do not smoke or allow others to smoke around you. If you need help quitting, talk to your doctor about stop-smoking programs and medicines. These can increase your chances of quitting for good. · Talk to your doctor about whether you have any risk factors for sexually transmitted infections (STIs). Having one sex partner (who does not have STIs and does not have sex with anyone else) is a good way to avoid these infections. · Use birth control if you do not want to have children at this time. Talk with your doctor about the choices available and what might be best for you. · Protect your skin from too much sun. When you're outdoors from 10 a.m. to 4 p.m., stay in the shade or cover up with clothing and a hat with a wide brim. Wear sunglasses that block UV rays. Even when it's cloudy, put broad-spectrum sunscreen (SPF 30 or higher) on any exposed skin. · See a dentist one or two times a year for checkups and to have your teeth cleaned. · Wear a seat belt in the car. · Drink alcohol in moderation, if at all. That means no more than 2 drinks a day for men and 1 drink a day for women. Follow your doctor's advice about when to have certain tests. These tests can spot problems early. For everyone · Cholesterol. Have the fat (cholesterol) in your blood tested after age 21. Your doctor will tell you how often to have this done based on your age, family history, or other things that can increase your risk for heart disease. · Blood pressure. Have your blood pressure checked during a routine doctor visit. Your doctor will tell you how often to check your blood pressure based on your age, your blood pressure results, and other factors. · Vision. Talk with your doctor about how often to have a glaucoma test. 
· Diabetes. Ask your doctor whether you should have tests for diabetes. · Colon cancer. Have a test for colon cancer at age 48. You may have one of several tests. If you are younger than 48, you may need a test earlier if you have any risk factors. Risk factors include whether you already had a precancerous polyp removed from your colon or whether your parent, brother, sister, or child has had colon cancer. For women · Breast exam and mammogram. Talk to your doctor about when you should have a clinical breast exam and a mammogram. Medical experts differ on whether and how often women under 50 should have these tests. Your doctor can help you decide what is right for you. · Pap test and pelvic exam. Begin Pap tests at age 24. A Pap test is the best way to find cervical cancer. The test often is part of a pelvic exam. Ask how often to have this test. 
· Tests for sexually transmitted infections (STIs). Ask whether you should have tests for STIs. You may be at risk if you have sex with more than one person, especially if your partners do not wear condoms. For men · Tests for sexually transmitted infections (STIs). Ask whether you should have tests for STIs. You may be at risk if you have sex with more than one person, especially if you do not wear a condom. · Testicular cancer exam. Ask your doctor whether you should check your testicles regularly. · Prostate exam. Talk to your doctor about whether you should have a blood test (called a PSA test) for prostate cancer.  Experts differ on whether and when men should have this test. Some experts suggest it if you are older than 39 and are -American or have a father or brother who got prostate cancer when he was younger than 72. When should you call for help? Watch closely for changes in your health, and be sure to contact your doctor if you have any problems or symptoms that concern you. Where can you learn more? Go to http://william-kory.info/. Enter P072 in the search box to learn more about \"Well Visit, Ages 25 to 48: Care Instructions. \" Current as of: May 12, 2017 Content Version: 11.4 © 6234-8763 [employer]. Care instructions adapted under license by [employer] (which disclaims liability or warranty for this information). If you have questions about a medical condition or this instruction, always ask your healthcare professional. [address] any warranty or liability for your use of this information. Introducing [facility] & HEALTH SERVICES! Dear Al Sutton: Thank you for requesting a [employer] account. Our records indicate that you already have an active [employer] account. You can access your account anytime at [email or web address] Did you know that you can access your hospital and ER discharge instructions at any time in [employer] You can also review all of your test results from your hospital stay or ER visit. Additional Information If you have questions, please visit the Frequently Asked Questions section of the [employer] website at [email or web address] Remember, [employer] is NOT to be used for urgent needs. For medical emergencies, dial 911. Now available from your iPhone and Android! Please provide this summary of care documentation to your next provider. Your primary care clinician is listed as Vivian Muss. If you have any questions after today's visit, please call [phone].

## 2018-05-22 NOTE — PROGRESS NOTES
CC:   Chief Complaint   Patient presents with    Depression    [address] [place] [address] Deja Brooke is a 21 y.o. female. Presents for routine evaluation. She has no complaints today. Now on Pristiq and Seroquel prescribed by Dr. Pk Patel (Psychiatry) for about 3 weeks. Not having much anxiety. Less depressed mood but still with low energy and not feeling motivated. Gets exercise through walking at work. Not eating much fruits and vegetables. Has intolerance to apple and bananas.     Is transgender and in the process of changing from female to male features. On testosterone injections for the past 4-5 months. Scheduled for breast reduction surgery on 9/6/18.     Other Providers: Wendy Rueda ([facility]), Dr. Pk Patel (Psychiatry)      Soc Hx  Single. Lives with her mother. No children. Works at working at the [employer]. Never smoker. Denies alcohol or recreational drug use. Is not sexually active. Is transgender.      ROS  A complete review of systems was performed and is negative except for those mentioned in the HPI. Patient Active Problem List   Diagnosis Code    Obesity (BMI 35.0-39.9 without comorbidity) E66.9    Moderate episode of recurrent major depressive disorder ([facility] F33.1    Raynaud's phenomenon I73.00    Allergic rhinitis J30.9    Panic disorder F41.0    Gender dysphoria in adult F61.0     Past Medical History:   Diagnosis Date    Migraines 2007    Mild depression ([facility] [address] 3/10/2015    Obesity 2007    RAD (reactive airway disease) 1574,5274    Rhinitis 2005    Allergic & Seasonal     No Known Allergies    Current Outpatient Prescriptions   Medication Sig Dispense Refill    Desvenlafaxine 100 mg Tb24 Take 100 mg by mouth daily.  QUEtiapine (SEROQUEL) 50 mg tablet Take 50 mg by mouth nightly as needed.       testosterone cypionate (DEPOTESTOTERONE CYPIONATE) 200 mg/mL injection INJECT 0.5 ML INTO THE MUSCLE Q WEEK  0    ALPRAZolam Juanita Day) 0.5 mg tablet Take 1 Tab by mouth two (2) times daily as needed for Anxiety. Max Daily Amount: 1 mg. Indications: ANXIETY WITH DEPRESSION 30 Tab 0    Cholecalciferol, Vitamin D3, (VITAMIN D3) 1,000 unit Cap Take  by mouth daily. PHYSICAL EXAM  Visit Vitals    /79 (BP 1 Location: Right arm, BP Patient Position: Sitting)    Pulse 82    Temp 98.7 °F (37.1 °C) (Oral)    Resp 18    Ht 5' 9\" (1.753 m)    Wt 262 lb (118.8 kg)    SpO2 98%    BMI 38.69 kg/m2       General: Obese, no distress. HEENT:  Head normocephalic/atraumatic, no scleral icterus  Neck: Supple. No carotid bruits, JVD, lymphadenopathy, or thyromegaly. Lungs:  Clear to ausculation bilaterally. Good air movement. Heart:  Regular rate and rhythm, normal S1 and S2, no murmur, gallop, or rub  Abdomen: Soft, non-distended, normal bowel sounds, no tenderness, no guarding, masses, rebound tenderness, or HSM. Extremities: No clubbing, cyanosis, or edema. 2+ DP's. Neurological: Alert and oriented. Non-focal.  Psychiatric: Normal mood and affect. Behavior is normal.           ASSESSMENT AND PLAN    ICD-10-CM ICD-9-CM    1. Moderate episode of recurrent major depressive disorder (HCC) F33.1 296.32    2. Routine general medical examination at a health care facility Z00.00 V70.0 LIPID PANEL      METABOLIC PANEL, COMPREHENSIVE      CBC WITH AUTOMATED DIFF      HEMOGLOBIN A1C WITH EAG      TSH RFX ON ABNORMAL TO FREE T4   3. Non-seasonal allergic rhinitis, unspecified trigger J30.89 477.8    4. Panic disorder F41.0 300.01    5. Gender dysphoria in adult F64.0 302.85    6. Obesity (BMI 35.0-39.9 without comorbidity) E66.9 278.00        Diagnoses and all orders for this visit:    1. Moderate episode of recurrent major depressive disorder (HCC)  Continue Pristiq and Seroquel. Follow up with Psychiatry.     2. Routine general medical examination at a health care facility  -     LIPID PANEL  -     METABOLIC PANEL, COMPREHENSIVE  -     CBC WITH AUTOMATED DIFF  -     HEMOGLOBIN A1C WITH EAG  -     TSH RFX ON ABNORMAL TO FREE T4    3. Non-seasonal allergic rhinitis, unspecified trigger    4. Panic disorder    5. Gender dysphoria in adult    6. Obesity (BMI 35.0-39.9 without comorbidity)  Discussed the patient's BMI with her. The BMI follow up plan is as follows: dietary management education, guidance, and counseling; encourage exercise; monitor weight; prescribed dietary intake. Follow up BMI in 6 months. Follow-up Disposition:  Return in about 6 months (around 11/22/2018), or if symptoms worsen or fail to improve, for Depresion, anxiety, weight. Provided patient and/or family with advanced directive information and answered pertinent questions. Encouraged patient to provide a copy of advanced directive to the office when available. I have discussed the diagnosis with the patient and the intended plan as seen in the above orders. Patient is in agreement. The patient has received an after-visit summary and questions were answered concerning future plans. I have discussed medication side effects and warnings with the patient as well.

## 2018-05-23 LAB
ALBUMIN SERPL-MCNC: 4.5 G/DL (ref 3.5–5.5)
ALBUMIN/GLOB SERPL: 1.5 {RATIO} (ref 1.2–2.2)
ALP SERPL-CCNC: 99 IU/L (ref 39–117)
ALT SERPL-CCNC: 21 IU/L (ref 0–32)
AST SERPL-CCNC: 26 IU/L (ref 0–40)
BASOPHILS # BLD AUTO: 0 X10E3/UL (ref 0–0.2)
BASOPHILS NFR BLD AUTO: 0 %
BILIRUB SERPL-MCNC: 0.6 MG/DL (ref 0–1.2)
BUN SERPL-MCNC: 10 MG/DL (ref 6–20)
BUN/CREAT SERPL: 11 (ref 9–23)
CALCIUM SERPL-MCNC: 9.9 MG/DL (ref 8.7–10.2)
CHLORIDE SERPL-SCNC: 99 MMOL/L (ref 96–106)
CHOLEST SERPL-MCNC: 177 MG/DL (ref 100–199)
CO2 SERPL-SCNC: 26 MMOL/L (ref 18–29)
CREAT SERPL-MCNC: 0.89 MG/DL (ref 0.57–1)
EOSINOPHIL # BLD AUTO: 0.1 X10E3/UL (ref 0–0.4)
EOSINOPHIL NFR BLD AUTO: 2 %
ERYTHROCYTE [DISTWIDTH] IN BLOOD BY AUTOMATED COUNT: 16.7 % (ref 12.3–15.4)
EST. AVERAGE GLUCOSE BLD GHB EST-MCNC: 105 MG/DL
GFR SERPLBLD CREATININE-BSD FMLA CKD-EPI: 106 ML/MIN/1.73
GFR SERPLBLD CREATININE-BSD FMLA CKD-EPI: 92 ML/MIN/1.73
GLOBULIN SER CALC-MCNC: 3.1 G/DL (ref 1.5–4.5)
GLUCOSE SERPL-MCNC: 83 MG/DL (ref 65–99)
HBA1C MFR BLD: 5.3 % (ref 4.8–5.6)
HCT VFR BLD AUTO: 42.2 % (ref 34–46.6)
HDLC SERPL-MCNC: 36 MG/DL
HGB BLD-MCNC: 14 G/DL (ref 11.1–15.9)
IMM GRANULOCYTES # BLD: 0 X10E3/UL (ref 0–0.1)
IMM GRANULOCYTES NFR BLD: 0 %
LDLC SERPL CALC-MCNC: 128 MG/DL (ref 0–99)
LYMPHOCYTES # BLD AUTO: 1.9 X10E3/UL (ref 0.7–3.1)
LYMPHOCYTES NFR BLD AUTO: 34 %
MCH RBC QN AUTO: 29 PG (ref 26.6–33)
MCHC RBC AUTO-ENTMCNC: 33.2 G/DL (ref 31.5–35.7)
MCV RBC AUTO: 87 FL (ref 79–97)
MONOCYTES # BLD AUTO: 0.4 X10E3/UL (ref 0.1–0.9)
MONOCYTES NFR BLD AUTO: 6 %
NEUTROPHILS # BLD AUTO: 3.2 X10E3/UL (ref 1.4–7)
NEUTROPHILS NFR BLD AUTO: 58 %
PLATELET # BLD AUTO: 341 X10E3/UL (ref 150–379)
POTASSIUM SERPL-SCNC: 4.9 MMOL/L (ref 3.5–5.2)
PROT SERPL-MCNC: 7.6 G/DL (ref 6–8.5)
RBC # BLD AUTO: 4.83 X10E6/UL (ref 3.77–5.28)
SODIUM SERPL-SCNC: 138 MMOL/L (ref 134–144)
TRIGL SERPL-MCNC: 63 MG/DL (ref 0–149)
TSH SERPL DL<=0.005 MIU/L-ACNC: 2.11 UIU/ML (ref 0.45–4.5)
VLDLC SERPL CALC-MCNC: 13 MG/DL (ref 5–40)
WBC # BLD AUTO: 5.6 X10E3/UL (ref 3.4–10.8)

## 2018-05-25 NOTE — PROGRESS NOTES
Spoke to [place] and pt is at work and will look at my chart for lab results and call with any questions.

## 2018-07-09 DIAGNOSIS — H10.9 BACTERIAL CONJUNCTIVITIS OF LEFT EYE: ICD-10-CM

## 2018-07-09 RX ORDER — CIPROFLOXACIN HYDROCHLORIDE 3.5 MG/ML
SOLUTION/ DROPS TOPICAL
Qty: 5 ML | Refills: 0 | Status: SHIPPED | OUTPATIENT
Start: 2018-07-09 | End: 2018-08-21 | Stop reason: ALTCHOICE

## 2018-08-15 RX ORDER — FEXOFENADINE HCL AND PSEUDOEPHEDRINE HCI 60; 120 MG/1; MG/1
1 TABLET, EXTENDED RELEASE ORAL DAILY
COMMUNITY
End: 2018-11-20

## 2018-08-15 NOTE — PERIOP NOTES
[address] [facility], [address]    MAIN OR [phone]    MAIN PRE OP [phone]    AMBULATORY PRE OP [phone]    PRE-ADMISSION TESTING [phone]       Surgery Date:   9/6/2018        Is surgery arrival time given by surgeon? NO  If NO, [facility] staff will call you between 3 and 7pm the day before your surgery with your arrival time. (If your surgery is on a Monday, we will call you the Friday before.)    Call [phone] after 7pm Monday-Friday if you did not receive your arrival time. Answers to Common Questions   When You  Arrive   Arrive at the [address] [facility] on the day of your surgery  Have your insurance card, photo ID, and any copayment (if needed)     Food   and   Drink   NO food or drink after midnight the night before surgery    This means NO water, gum, mints, coffee, juice, etc.  No alcohol (beer, wine, liquor) 24 hours before and after surgery     Medicine to   TAKE   Morning of Surgery   MEDICATIONS TO TAKE THE MORNING OF SURGERY WITH A SIP OF WATER:    Allegra, Desvenlafaxine, Xanax if needed. Stop your vitamin D3 7 days before surgery.      Medicine  To  STOP   FOR PAIN   NO Aspirin for pain    NO Non-Steroidal Anti-Inflammatory Drugs (NSAIDs:   for example, Ibuprofen (Advil, Motrin), Naproxen (Aleve)   STOP herbal supplements and vitamins 1 week before surgery   You can take Tylenol  follow instructions on the bottle     Blood  Thinners    If you take Aspirin, Plavix, Coumadin, blood-thinning or anti-clot medicine, talk to your surgeon and/or follow the instructions from the doctor who told you to take that medicine     Clothing  [facility] [address] Wear loose, comfortable clothes   Wear glasses instead of contacts   Leave money, jewelry and valuables at home   No make-up, particularly mascara, the day of surgery   REMOVE ALL piercings, rings, and jewelry - leave at home   Wear hair loose or down; no pony-tails, buns, or metal hair clips    BATHING   Follow all special bath instructions (for total joint replacement, spine and bowel surgeries.)   If you shower the morning of surgery, please do not apply any lotions, powders, or deodorants afterwards. Do not shave or trim anywhere 24 hours before surgery. Going Home  or  Spending the Night    SAME-DAY SURGERY: You must have a responsible adult drive you home and stay with you 24 hours after surgery   ADMITS: If your doctor is keeping you into the hospital after surgery, leave personal belongings/luggage in your car until you have a hospital room number. Hospital discharge time is 12 noon  Drivers must be here before 12 noon unless you are told differently         Follow all instructions so your surgery wont be cancelled. Please, be on time. If a situation occurs and you are delayed the day of surgery, call [phone] or [phone]. If your physical condition changes (like a fever, cold, flu, etc.) call your surgeon as soon as possible. The Preadmission Testing staff can be reached at 21 [phone]. OTHER SPECIAL INSTRUCTIONS:  Free  parking 7am-5pm    The patient was contacted  via phone. She  verbalize  understanding of all instructions and does not  need reinforcement.

## 2018-08-21 ENCOUNTER — OFFICE VISIT (OUTPATIENT)
Dept: INTERNAL MEDICINE CLINIC | Facility: CLINIC | Age: 23
End: 2018-08-21

## 2018-08-21 VITALS
DIASTOLIC BLOOD PRESSURE: 78 MMHG | TEMPERATURE: 98.8 F | OXYGEN SATURATION: 98 % | RESPIRATION RATE: 16 BRPM | HEIGHT: 69 IN | WEIGHT: 257.8 LBS | SYSTOLIC BLOOD PRESSURE: 128 MMHG | BODY MASS INDEX: 38.18 KG/M2 | HEART RATE: 94 BPM

## 2018-08-21 DIAGNOSIS — J02.9 SORE THROAT: ICD-10-CM

## 2018-08-21 DIAGNOSIS — J20.9 ACUTE BRONCHITIS, UNSPECIFIED ORGANISM: Primary | ICD-10-CM

## 2018-08-21 DIAGNOSIS — F41.0 PANIC DISORDER WITHOUT AGORAPHOBIA: ICD-10-CM

## 2018-08-21 DIAGNOSIS — F33.1 MODERATE EPISODE OF RECURRENT MAJOR DEPRESSIVE DISORDER (HCC): ICD-10-CM

## 2018-08-21 PROBLEM — E66.01 SEVERE OBESITY (BMI 35.0-39.9): Status: ACTIVE | Noted: 2018-08-21

## 2018-08-21 LAB
S PYO AG THROAT QL: NEGATIVE
VALID INTERNAL CONTROL?: YES

## 2018-08-21 RX ORDER — AZITHROMYCIN 250 MG/1
TABLET, FILM COATED ORAL
Qty: 6 TAB | Refills: 0 | Status: SHIPPED | OUTPATIENT
Start: 2018-08-21 | End: 2018-09-07

## 2018-08-21 RX ORDER — ALPRAZOLAM 0.5 MG/1
0.5 TABLET ORAL
Qty: 30 TAB | Refills: 0 | Status: SHIPPED | OUTPATIENT
Start: 2018-08-21 | End: 2020-01-21 | Stop reason: ALTCHOICE

## 2018-08-21 RX ORDER — BENZONATATE 100 MG/1
100 CAPSULE ORAL
Qty: 21 CAP | Refills: 0 | Status: SHIPPED | OUTPATIENT
Start: 2018-08-21 | End: 2018-08-28

## 2018-08-21 NOTE — PATIENT INSTRUCTIONS
Bronchitis: Care Instructions  Your Care Instructions    Bronchitis is inflammation of the bronchial tubes, which carry air to the lungs. The tubes swell and produce mucus, or phlegm. The mucus and inflamed bronchial tubes make you cough. You may have trouble breathing. Most cases of bronchitis are caused by viruses like those that cause colds. Antibiotics usually do not help and they may be harmful. Bronchitis usually develops rapidly and lasts about 2 to 3 weeks in otherwise healthy people. Follow-up care is a key part of your treatment and safety. Be sure to make and go to all appointments, and call your doctor if you are having problems. It's also a good idea to know your test results and keep a list of the medicines you take. How can you care for yourself at home? · Take all medicines exactly as prescribed. Call your doctor if you think you are having a problem with your medicine. · Get some extra rest.  · Take an over-the-counter pain medicine, such as acetaminophen (Tylenol), ibuprofen (Advil, Motrin), or naproxen (Aleve) to reduce fever and relieve body aches. Read and follow all instructions on the label. · Do not take two or more pain medicines at the same time unless the doctor told you to. Many pain medicines have acetaminophen, which is Tylenol. Too much acetaminophen (Tylenol) can be harmful. · Take an over-the-counter cough medicine that contains dextromethorphan to help quiet a dry, hacking cough so that you can sleep. Avoid cough medicines that have more than one active ingredient. Read and follow all instructions on the label. · Breathe moist air from a humidifier, hot shower, or sink filled with hot water. The heat and moisture will thin mucus so you can cough it out. · Do not smoke. Smoking can make bronchitis worse. If you need help quitting, talk to your doctor about stop-smoking programs and medicines. These can increase your chances of quitting for good.   When should you call for help? Call 911 anytime you think you may need emergency care. For example, call if:    · You have severe trouble breathing.    Call your doctor now or seek immediate medical care if:    · You have new or worse trouble breathing.     · You cough up dark brown or bloody mucus (sputum).     · You have a new or higher fever.     · You have a new rash.    Watch closely for changes in your health, and be sure to contact your doctor if:    · You cough more deeply or more often, especially if you notice more mucus or a change in the color of your mucus.     · You are not getting better as expected. Where can you learn more? Go to http://william-kory.info/. Enter H333 in the search box to learn more about \"Bronchitis: Care Instructions. \"  Current as of: December 6, 2017  Content Version: 11.7  © 8284-3929 [employer]. Care instructions adapted under license by [employer] (which disclaims liability or warranty for this information). If you have questions about a medical condition or this instruction, always ask your healthcare professional. [address] any warranty or liability for your use of this information.

## 2018-08-21 NOTE — PROGRESS NOTES
Claribel Hazel  Identified pt with two pt identifiers(name and ). Chief Complaint   Patient presents with    Cough       1. Have you been to the ER, urgent care clinic since your last visit? Hospitalized since your last visit? NO    2. Have you seen or consulted any other health care providers outside of the [address] [place] [address] since your last visit? Include any pap smears or colon screening. NO    Today's provider has been notified of reason for visit, vitals and flowsheets obtained on patients.      Patient received paperwork for advance directive during previous visit but has not completed at this time     Reviewed record In preparation for visit, huddled with provider and have obtained necessary documentation      Health Maintenance Due   Topic    Influenza Age 5 to Adult        Wt Readings from Last 3 Encounters:   18 257 lb 12.8 oz (116.9 kg)   18 262 lb (118.8 kg)   18 267 lb (121.1 kg)     Temp Readings from Last 3 Encounters:   18 98.8 °F (37.1 °C) (Oral)   18 98.7 °F (37.1 °C) (Oral)   18 98.8 °F (37.1 °C)     BP Readings from Last 3 Encounters:   18 128/78   18 117/79   18 126/82     Pulse Readings from Last 3 Encounters:   18 94   18 82   18 89     Vitals:    18 1314   BP: 128/78   Pulse: 94   Resp: 16   Temp: 98.8 °F (37.1 °C)   TempSrc: Oral   SpO2: 98%   Weight: 257 lb 12.8 oz (116.9 kg)   Height: 5' 9\" (1.753 m)   PainSc:   5         Learning Assessment:  :     Learning Assessment 3/10/2015   PRIMARY LEARNER Patient   HIGHEST LEVEL OF EDUCATION - PRIMARY LEARNER  2 YEARS OF COLLEGE   BARRIERS PRIMARY LEARNER NONE   CO-LEARNER CAREGIVER No   PRIMARY LANGUAGE ENGLISH   LEARNER PREFERENCE PRIMARY LISTENING   ANSWERED BY patient   RELATIONSHIP SELF       Depression Screening:  :     PHQ over the last two weeks 3/10/2015   Little interest or pleasure in doing things Nearly every day   Feeling down, depressed, irritable, or hopeless Nearly every day   Total Score PHQ 2 6   Trouble falling or staying asleep, or sleeping too much Not at all   Feeling tired or having little energy Several days   Poor appetite, weight loss, or overeating Not at all   Feeling bad about yourself - or that you are a failure or have let yourself or your family down Not at all   Trouble concentrating on things such as school, work, reading, or watching TV Not at all   Moving or speaking so slowly that other people could have noticed; or the opposite being so fidgety that others notice Several days   Thoughts of being better off dead, or hurting yourself in some way Not at all   PHQ 9 Score 8   How difficult have these problems made it for you to do your work, take care of your home and get along with others Somewhat difficult       Fall Risk Assessment:  :     No flowsheet data found. Abuse Screening:  :     No flowsheet data found. ADL Screening:  :     ADL Assessment 4/30/2018   Feeding yourself No Help Needed   Getting from bed to chair No Help Needed   Getting dressed No Help Needed   Bathing or showering No Help Needed   Walk across the room (includes cane/walker) No Help Needed   Using the telphone No Help Needed   Taking your medications No Help Needed   Preparing meals No Help Needed   Managing money (expenses/bills) No Help Needed   Moderately strenuous housework (laundry) No Help Needed   Shopping for personal items (toiletries/medicines) No Help Needed   Shopping for groceries No Help Needed   Driving No Help Needed   Climbing a flight of stairs No Help Needed   Getting to places beyond walking distances No Help Needed         Per Dr. Jose Nayak verbal order read back orders placed for strep test.        Medication reconciliation up to date and corrected with patient at this time.

## 2018-08-21 NOTE — MR AVS SNAPSHOT
[address] [address] [phone] Patient: Meredith Ganser MRN: [id number] :1995 Visit Information Date & Time Provider Department Dept. Phone Encounter #  
 2018  1:20 PM MD [facility] [address] [id number] Follow-up Instructions Return if symptoms worsen or fail to improve, for Scheduled appointment on 18. Your Appointments 2018  8:30 AM  
ESTABLISHED PATIENT with Joyce Da Silva MD  
[place]) Appt Note: 6 months (around 2018), or if symptoms worsen or fail to improve, for Depresion, anxiety, weight Constantin [phone]  
  
   
 [facility] Upcoming Health Maintenance Date Due Influenza Age 5 to Adult 2018 PAP AKA CERVICAL CYTOLOGY 10/20/2020 DTaP/Tdap/Td series (3 - Td) 2027 Allergies as of 2018  Review Complete On: 2018 By: Joyce Da Silva MD  
  
 Severity Noted Reaction Type Reactions Latex  08/15/2018    Hives Current Immunizations  Reviewed on 2015 Name Date Human Papillomavirus 10/23/2012, 2012, 3/27/2012 Influenza Vaccine 10/30/2013 Influenza Vaccine PF 2013 Meningococcal Vaccine 3/27/2012 TDAP Vaccine 2006 Not reviewed this visit You Were Diagnosed With   
  
 Codes Comments Acute bronchitis, unspecified organism    -  Primary ICD-10-CM: J20.9 ICD-9-CM: 466.0 Sore throat     ICD-10-CM: J02.9 ICD-9-CM: 777 Panic disorder without agoraphobia     ICD-10-CM: F41.0 ICD-9-CM: 300.01 Moderate episode of recurrent major depressive disorder (HCC)     ICD-10-CM: F33.1 ICD-9-CM: 296.32 Vitals BP Pulse Temp Resp Height(growth percentile) Weight(growth percentile) 128/78 (BP 1 Location: Left arm, BP Patient Position: Sitting) 94 98.8 °F (37.1 °C) (Oral) 16 5' 9\" (1.753 m) 257 lb 12.8 oz (116.9 kg) SpO2 BMI OB Status Smoking Status 98% 38.07 kg/m2 Unknown Never Smoker BMI and BSA Data Body Mass Index Body Surface Area 38.07 kg/m 2 2.39 m 2 Preferred Pharmacy Pharmacy Name Phone [facility] [address] [place] [address] Hodan Miller [phone] Your Updated Medication List  
  
   
This list is accurate as of 8/21/18  1:58 PM.  Always use your most recent med list. ALLEGRA-D 12 HOUR  mg Tb12 Generic drug:  fexofenadine-pseudoephedrine Take 1 Tab by mouth daily. ALPRAZolam 0.5 mg tablet Commonly known as:  Dawn Fell Take 1 Tab by mouth two (2) times daily as needed for Anxiety. Max Daily Amount: 1 mg. Indications: ANXIETY WITH DEPRESSION  
  
 azithromycin 250 mg tablet Commonly known as:  Bennetta Plum Take 2 tabs by mouth on day 1 and 1 tab daily on days 2-5.  
  
 benzonatate 100 mg capsule Commonly known as:  TESSALON Take 1 Cap by mouth three (3) times daily as needed for Cough for up to 7 days. Desvenlafaxine 100 mg Tb24 Take 100 mg by mouth daily. QUEtiapine 50 mg tablet Commonly known as:  SEROquel Take 50 mg by mouth nightly as needed. testosterone cypionate 200 mg/mL injection Commonly known as:  DEPOTESTOTERONE CYPIONATE INJECT 0.5 ML INTO THE MUSCLE Q WEEK  
  
 VITAMIN D3 1,000 unit Cap Generic drug:  cholecalciferol Take 1,000 Units by mouth daily. Prescriptions Printed Refills ALPRAZolam (XANAX) 0.5 mg tablet 0 Sig: Take 1 Tab by mouth two (2) times daily as needed for Anxiety. Max Daily Amount: 1 mg. Indications: ANXIETY WITH DEPRESSION Class: Print Route: Oral  
  
Prescriptions Sent to Pharmacy  Refills  
 azithromycin (ZITHROMAX) 250 mg tablet 0  
 Sig: Take 2 tabs by mouth on day 1 and 1 tab daily on days 2-5. Class: Normal  
 Pharmacy: [address] [place] [address] Ph #: [phone]  
 benzonatate (TESSALON) 100 mg capsule 0 Sig: Take 1 Cap by mouth three (3) times daily as needed for Cough for up to 7 days. Class: Normal  
 Pharmacy: [facility] DR RAFI CORBETT [address] [place] [address], [address] [place] [address] Ph #: [phone] Route: Oral  
  
We Performed the Following AMB POC RAPID STREP A [32882 CPT(R)] Follow-up Instructions Return if symptoms worsen or fail to improve, for Scheduled appointment on 11/20/18. Patient Instructions Bronchitis: Care Instructions Your Care Instructions Bronchitis is inflammation of the bronchial tubes, which carry air to the lungs. The tubes swell and produce mucus, or phlegm. The mucus and inflamed bronchial tubes make you cough. You may have trouble breathing. Most cases of bronchitis are caused by viruses like those that cause colds. Antibiotics usually do not help and they may be harmful. Bronchitis usually develops rapidly and lasts about 2 to 3 weeks in otherwise healthy people. Follow-up care is a key part of your treatment and safety. Be sure to make and go to all appointments, and call your doctor if you are having problems. It's also a good idea to know your test results and keep a list of the medicines you take. How can you care for yourself at home? · Take all medicines exactly as prescribed. Call your doctor if you think you are having a problem with your medicine. · Get some extra rest. 
· Take an over-the-counter pain medicine, such as acetaminophen (Tylenol), ibuprofen (Advil, Motrin), or naproxen (Aleve) to reduce fever and relieve body aches. Read and follow all instructions on the label. · Do not take two or more pain medicines at the same time unless the doctor told you to.  Many pain medicines have acetaminophen, which is Tylenol. Too much acetaminophen (Tylenol) can be harmful. · Take an over-the-counter cough medicine that contains dextromethorphan to help quiet a dry, hacking cough so that you can sleep. Avoid cough medicines that have more than one active ingredient. Read and follow all instructions on the label. · Breathe moist air from a humidifier, hot shower, or sink filled with hot water. The heat and moisture will thin mucus so you can cough it out. · Do not smoke. Smoking can make bronchitis worse. If you need help quitting, talk to your doctor about stop-smoking programs and medicines. These can increase your chances of quitting for good. When should you call for help? Call 911 anytime you think you may need emergency care. For example, call if: 
  · You have severe trouble breathing.  
 Call your doctor now or seek immediate medical care if: 
  · You have new or worse trouble breathing.  
  · You cough up dark brown or bloody mucus (sputum).  
  · You have a new or higher fever.  
  · You have a new rash.  
 Watch closely for changes in your health, and be sure to contact your doctor if: 
  · You cough more deeply or more often, especially if you notice more mucus or a change in the color of your mucus.  
  · You are not getting better as expected. Where can you learn more? Go to http://william-kory.info/. Enter H333 in the search box to learn more about \"Bronchitis: Care Instructions. \" Current as of: December 6, 2017 Content Version: 11.7 © 6220-6215 [employer]. Care instructions adapted under license by [employer] (which disclaims liability or warranty for this information). If you have questions about a medical condition or this instruction, always ask your healthcare professional. [address] [address] any warranty or liability for your use of this information. Introducing [facility]!    
 Dear Keyla Dangelo: 
 Thank you for requesting a Unicotrip account. Our records indicate that you already have an active Unicotrip account. You can access your account anytime at [email or web address] Did you know that you can access your hospital and ER discharge instructions at any time in Unicotrip? You can also review all of your test results from your hospital stay or ER visit. Additional Information If you have questions, please visit the Frequently Asked Questions section of the Unicotrip website at [email or web address] Remember, Unicotrip is NOT to be used for urgent needs. For medical emergencies, dial 911. Now available from your iPhone and Android! Please provide this summary of care documentation to your next provider. Your primary care clinician is listed as Aminah Avila. If you have any questions after today's visit, please call [phone].

## 2018-08-21 NOTE — PROGRESS NOTES
CC:   Chief Complaint   Patient presents with    Cough     room 3A    Sore Throat     strep test performed       HISTORY OF PRESENT ILLNESS  Adwoa Feliciano is a 21 y.o. female. Patient complains of 5 day history of cold-like symptoms. Started with sore throat, mild dyspnea and wheezing. Cough started 3 days ago; productive of yellowish sputum, once was brownish; also has hoarseness. Denies fevers, chills, runny nose, headaches, sinus congestion, ear pains, chest congestion, hemoptysis, chest pain, or myalgias. Treatment to date: none. Patient reports no ill contacts. Patient Active Problem List   Diagnosis Code    Obesity (BMI 35.0-39.9 without comorbidity) E66.9    Moderate episode of recurrent major depressive disorder ([facility] [place] [address] F33.1    Raynaud's phenomenon I73.00    Allergic rhinitis J30.9    Panic disorder F41.0    Gender dysphoria in adult F64.0    Severe obesity (BMI 35.0-39.9) ([facility] [place] [address] E66.01     Past Medical History:   Diagnosis Date    Anxiety     Migraines 2007    patient states no longer having migraines as of 8/15/18    Mild depression ([facility] [place] [address] 3/10/2015    Obesity 2007    RAD (reactive airway disease) 8550,8275    patient denies having this currently as of 8/15/18    Rhinitis 2005    Allergic & Seasonal     Allergies   Allergen Reactions    Latex Hives       Current Outpatient Prescriptions   Medication Sig Dispense Refill    fexofenadine-pseudoephedrine (ALLEGRA-D 12 HOUR)  mg Tb12 Take 1 Tab by mouth daily.  Desvenlafaxine 100 mg Tb24 Take 100 mg by mouth daily.  QUEtiapine (SEROQUEL) 50 mg tablet Take 50 mg by mouth nightly as needed.  testosterone cypionate (DEPOTESTOTERONE CYPIONATE) 200 mg/mL injection INJECT 0.5 ML INTO THE MUSCLE Q WEEK  0    ALPRAZolam (XANAX) 0.5 mg tablet Take 1 Tab by mouth two (2) times daily as needed for Anxiety. Max Daily Amount: 1 mg.  Indications: ANXIETY WITH DEPRESSION 30 Tab 0    Cholecalciferol, Vitamin D3, (VITAMIN D3) 1,000 unit Cap Take 1,000 Units by mouth daily. PHYSICAL EXAM  Visit Vitals    /78 (BP 1 Location: Left arm, BP Patient Position: Sitting)    Pulse 94    Temp 98.8 °F (37.1 °C) (Oral)    Resp 16    Ht 5' 9\" (1.753 m)    Wt 257 lb 12.8 oz (116.9 kg)    SpO2 98%    BMI 38.07 kg/m2       General: Obese, no distress. Coughs occasionally. HEENT:  Head normocephalic/atraumatic, no scleral icterus or conjunctival injection. Normal nasal mucosa. Oropharynx benign. No sinus tenderness. TM's and ear canals normal bilaterally. Neck: Supple. No lymphadenopathy. Lungs:  Clear to ausculation bilaterally. Good air movement. Heart:  Regular rate and rhythm, normal S1 and S2, no murmur, gallop, or rub  Neurological: Alert and oriented. Psychiatric: Normal mood and affect. Behavior is normal.       Results for orders placed or performed in visit on 08/21/18   AMB POC RAPID STREP A   Result Value Ref Range    VALID INTERNAL CONTROL POC Yes     Group A Strep Ag Negative Negative           ASSESSMENT AND PLAN    ICD-10-CM ICD-9-CM    1. Acute bronchitis, unspecified organism J20.9 466.0 azithromycin (ZITHROMAX) 250 mg tablet      benzonatate (TESSALON) 100 mg capsule   2. Sore throat J02.9 462 AMB POC RAPID STREP A   3. Panic disorder without agoraphobia F41.0 300.01 ALPRAZolam (XANAX) 0.5 mg tablet   4. Moderate episode of recurrent major depressive disorder (HCC) F33.1 296.32 ALPRAZolam (XANAX) 0.5 mg tablet       Diagnoses and all orders for this visit:    1. Acute bronchitis, unspecified organism  -     Start azithromycin (ZITHROMAX) 250 mg tablet; Take 2 tabs by mouth on day 1 and 1 tab daily on days 2-5.  -     Start benzonatate (TESSALON) 100 mg capsule; Take 1 Cap by mouth three (3) times daily as needed for Cough for up to 7 days. 2. Sore throat  Negative strep test.  -     AMB POC RAPID STREP A    3. Panic disorder without agoraphobia  -     Refill ALPRAZolam (XANAX) 0.5 mg tablet;  Take 1 Tab by mouth two (2) times daily as needed for Anxiety. Max Daily Amount: 1 mg. Indications: ANXIETY WITH DEPRESSION (#30, NR)    4. Moderate episode of recurrent major depressive disorder (HCC)  -     Refill ALPRAZolam (XANAX) 0.5 mg tablet; Take 1 Tab by mouth two (2) times daily as needed for Anxiety. Max Daily Amount: 1 mg. Indications: ANXIETY WITH DEPRESSION (#30, NR)      Follow-up Disposition:  Return if symptoms worsen or fail to improve, for Scheduled appointment on 11/20/18. Provided patient and/or family with advanced directive information and answered pertinent questions. Encouraged patient to provide a copy of advanced directive to the office when available. I have discussed the diagnosis with the patient and the intended plan as seen in the above orders. Patient is in agreement. The patient has received an after-visit summary and questions were answered concerning future plans. I have discussed medication side effects and warnings with the patient as well.

## 2018-08-24 ENCOUNTER — TELEPHONE (OUTPATIENT)
Dept: INTERNAL MEDICINE CLINIC | Facility: CLINIC | Age: 23
End: 2018-08-24

## 2018-08-24 DIAGNOSIS — R06.02 SHORTNESS OF BREATH: ICD-10-CM

## 2018-08-24 DIAGNOSIS — J20.9 ACUTE BRONCHITIS, UNSPECIFIED ORGANISM: Primary | ICD-10-CM

## 2018-08-24 RX ORDER — ALBUTEROL SULFATE 90 UG/1
2 AEROSOL, METERED RESPIRATORY (INHALATION)
Qty: 1 INHALER | Refills: 0 | Status: SHIPPED | OUTPATIENT
Start: 2018-08-24 | End: 2018-11-20

## 2018-08-24 RX ORDER — PREDNISONE 10 MG/1
TABLET ORAL
Qty: 21 TAB | Refills: 0 | Status: SHIPPED | OUTPATIENT
Start: 2018-08-24 | End: 2018-09-07

## 2018-08-24 NOTE — TELEPHONE ENCOUNTER
I have sent prescriptions for an inhaler and prednisone pack. If no improvement within 48 hrs, go to Urgent Care.

## 2018-08-24 NOTE — TELEPHONE ENCOUNTER
Verified patients name and date of birth. Patient informed per Dr Evon Sims and she stated understanding.

## 2018-08-24 NOTE — TELEPHONE ENCOUNTER
Patients Mother Ilana Bro called(on [employer]). Patient is having worsened cough, unable to hardly speak when coughing, feels as if her breath is being \"cut off\". Patient has not went back to work, feels weak, is not eating much but is drinking fluids. Please advise. [phone].

## 2018-09-05 ENCOUNTER — ANESTHESIA EVENT (OUTPATIENT)
Dept: SURGERY | Age: 23
End: 2018-09-05
Payer: COMMERCIAL

## 2018-09-06 ENCOUNTER — ANESTHESIA (OUTPATIENT)
Dept: SURGERY | Age: 23
End: 2018-09-06
Payer: COMMERCIAL

## 2018-09-06 ENCOUNTER — HOSPITAL ENCOUNTER (OUTPATIENT)
Age: 23
Setting detail: OBSERVATION
Discharge: HOME | End: 2018-09-07
Attending: SURGERY | Admitting: SURGERY
Payer: COMMERCIAL

## 2018-09-06 PROBLEM — F64.9 GENDER IDENTITY DISORDER: Status: ACTIVE | Noted: 2018-09-06

## 2018-09-06 LAB — HCG UR QL: NEGATIVE

## 2018-09-06 PROCEDURE — 74011250636 HC RX REV CODE- 250/636: Performed by: SURGERY

## 2018-09-06 PROCEDURE — 88305 TISSUE EXAM BY PATHOLOGIST: CPT | Performed by: SURGERY

## 2018-09-06 PROCEDURE — 77030018836 HC SOL IRR NACL ICUM -A: Performed by: SURGERY

## 2018-09-06 PROCEDURE — 76060000108 HC COSMETIC ANESTHESIA TIME CHARGE

## 2018-09-06 PROCEDURE — 74011000258 HC RX REV CODE- 258: Performed by: SURGERY

## 2018-09-06 PROCEDURE — 77030008684 HC TU ET CUF COVD -B: Performed by: ANESTHESIOLOGY

## 2018-09-06 PROCEDURE — 77030028700 HC BLD TISS PLSM MEDT -E: Performed by: SURGERY

## 2018-09-06 PROCEDURE — 77030039266 HC ADH SKN EXOFIN S2SG -A: Performed by: SURGERY

## 2018-09-06 PROCEDURE — 77030016570 HC BLNKT BAIR HGGR 3M -B: Performed by: SURGERY

## 2018-09-06 PROCEDURE — 74011000250 HC RX REV CODE- 250

## 2018-09-06 PROCEDURE — 74011250636 HC RX REV CODE- 250/636

## 2018-09-06 PROCEDURE — 77030020061 HC IV BLD WRMR ADMIN SET 3M -B: Performed by: ANESTHESIOLOGY

## 2018-09-06 PROCEDURE — 77030002933 HC SUT MCRYL J&J -A: Performed by: SURGERY

## 2018-09-06 PROCEDURE — 76010000138 HC OR TIME 0.5 TO 1 HR

## 2018-09-06 PROCEDURE — 74011250636 HC RX REV CODE- 250/636: Performed by: PHYSICIAN ASSISTANT

## 2018-09-06 PROCEDURE — 77030011640 HC PAD GRND REM COVD -A: Performed by: SURGERY

## 2018-09-06 PROCEDURE — 77030008467 HC STPLR SKN COVD -B: Performed by: SURGERY

## 2018-09-06 PROCEDURE — 77030013567 HC DRN WND RESERV BARD -A: Performed by: SURGERY

## 2018-09-06 PROCEDURE — 74011000250 HC RX REV CODE- 250: Performed by: SURGERY

## 2018-09-06 PROCEDURE — 77030020262 HC SOL INJ SOD CL 0.9% 100ML: Performed by: SURGERY

## 2018-09-06 PROCEDURE — 76210000006 HC OR PH I REC 0.5 TO 1 HR

## 2018-09-06 PROCEDURE — 77030031139 HC SUT VCRL2 J&J -A: Performed by: SURGERY

## 2018-09-06 PROCEDURE — 74011250637 HC RX REV CODE- 250/637: Performed by: PHYSICIAN ASSISTANT

## 2018-09-06 PROCEDURE — 76060000033 HC ANESTHESIA 1 TO 1.5 HR: Performed by: SURGERY

## 2018-09-06 PROCEDURE — 76010010942 HC COSMETIC OR TIME CHARGE

## 2018-09-06 PROCEDURE — 77030018719 HC DRSG PTCH ANTIMIC J&J -A: Performed by: SURGERY

## 2018-09-06 PROCEDURE — 77030002916 HC SUT ETHLN J&J -A: Performed by: SURGERY

## 2018-09-06 PROCEDURE — 77030011283 HC ELECTRD NDL COVD -A: Performed by: SURGERY

## 2018-09-06 PROCEDURE — 76210000006 HC OR PH I REC 0.5 TO 1 HR: Performed by: SURGERY

## 2018-09-06 PROCEDURE — 77030020782 HC GWN BAIR PAWS FLX 3M -B

## 2018-09-06 PROCEDURE — 77030026438 HC STYL ET INTUB CARD -A: Performed by: ANESTHESIOLOGY

## 2018-09-06 PROCEDURE — 99218 HC RM OBSERVATION: CPT

## 2018-09-06 PROCEDURE — 74011250636 HC RX REV CODE- 250/636: Performed by: ANESTHESIOLOGY

## 2018-09-06 PROCEDURE — 77030008534 HC TBNG LIPOSUC BYRO -B: Performed by: SURGERY

## 2018-09-06 PROCEDURE — 77030019908 HC STETH ESOPH SIMS -A: Performed by: ANESTHESIOLOGY

## 2018-09-06 PROCEDURE — 81025 URINE PREGNANCY TEST: CPT

## 2018-09-06 PROCEDURE — 77030002986 HC SUT PROL J&J -A: Performed by: SURGERY

## 2018-09-06 PROCEDURE — 77030032490 HC SLV COMPR SCD KNE COVD -B: Performed by: SURGERY

## 2018-09-06 PROCEDURE — 74011000272 HC RX REV CODE- 272: Performed by: SURGERY

## 2018-09-06 PROCEDURE — 77030008463 HC STPLR SKN PROX J&J -B: Performed by: SURGERY

## 2018-09-06 PROCEDURE — 77030008538 HC TBNG LIPO SUC WELL -B: Performed by: SURGERY

## 2018-09-06 PROCEDURE — 88305 TISSUE EXAM BY PATHOLOGIST: CPT

## 2018-09-06 PROCEDURE — 77030029914 HC PMP F NGP WND DRSG PICO S&N -C: Performed by: SURGERY

## 2018-09-06 PROCEDURE — 76060000035 HC ANESTHESIA 2 TO 2.5 HR: Performed by: SURGERY

## 2018-09-06 PROCEDURE — 77030013079 HC BLNKT BAIR HGGR 3M -A: Performed by: ANESTHESIOLOGY

## 2018-09-06 PROCEDURE — 77030005538 HC CATH URETH FOL44 BARD -B: Performed by: SURGERY

## 2018-09-06 PROCEDURE — 76010000131 HC OR TIME 2 TO 2.5 HR: Performed by: SURGERY

## 2018-09-06 PROCEDURE — 77030020255 HC SOL INJ LR 1000ML BG: Performed by: SURGERY

## 2018-09-06 PROCEDURE — 76210000017 HC OR PH I REC 1.5 TO 2 HR: Performed by: SURGERY

## 2018-09-06 PROCEDURE — 77030012407 HC DRN WND BARD -B: Performed by: SURGERY

## 2018-09-06 RX ORDER — SODIUM CHLORIDE 0.9 % (FLUSH) 0.9 %
5-10 SYRINGE (ML) INJECTION AS NEEDED
Status: DISCONTINUED | OUTPATIENT
Start: 2018-09-06 | End: 2018-09-07 | Stop reason: HOSPADM

## 2018-09-06 RX ORDER — FENTANYL CITRATE 50 UG/ML
INJECTION, SOLUTION INTRAMUSCULAR; INTRAVENOUS AS NEEDED
Status: DISCONTINUED | OUTPATIENT
Start: 2018-09-06 | End: 2018-09-06 | Stop reason: HOSPADM

## 2018-09-06 RX ORDER — SODIUM CHLORIDE 0.9 % (FLUSH) 0.9 %
5-10 SYRINGE (ML) INJECTION EVERY 8 HOURS
Status: DISCONTINUED | OUTPATIENT
Start: 2018-09-06 | End: 2018-09-06 | Stop reason: HOSPADM

## 2018-09-06 RX ORDER — SODIUM CHLORIDE 0.9 % (FLUSH) 0.9 %
5-10 SYRINGE (ML) INJECTION EVERY 8 HOURS
Status: DISCONTINUED | OUTPATIENT
Start: 2018-09-06 | End: 2018-09-07 | Stop reason: HOSPADM

## 2018-09-06 RX ORDER — LIDOCAINE HYDROCHLORIDE 10 MG/ML
0.1 INJECTION, SOLUTION EPIDURAL; INFILTRATION; INTRACAUDAL; PERINEURAL AS NEEDED
Status: DISCONTINUED | OUTPATIENT
Start: 2018-09-06 | End: 2018-09-06 | Stop reason: HOSPADM

## 2018-09-06 RX ORDER — SODIUM CHLORIDE, SODIUM LACTATE, POTASSIUM CHLORIDE, CALCIUM CHLORIDE 600; 310; 30; 20 MG/100ML; MG/100ML; MG/100ML; MG/100ML
125 INJECTION, SOLUTION INTRAVENOUS CONTINUOUS
Status: DISCONTINUED | OUTPATIENT
Start: 2018-09-06 | End: 2018-09-06 | Stop reason: HOSPADM

## 2018-09-06 RX ORDER — DESVENLAFAXINE SUCCINATE 50 MG/1
100 TABLET, EXTENDED RELEASE ORAL DAILY
Status: DISCONTINUED | OUTPATIENT
Start: 2018-09-07 | End: 2018-09-07 | Stop reason: HOSPADM

## 2018-09-06 RX ORDER — GLYCOPYRROLATE 0.2 MG/ML
INJECTION INTRAMUSCULAR; INTRAVENOUS AS NEEDED
Status: DISCONTINUED | OUTPATIENT
Start: 2018-09-06 | End: 2018-09-06 | Stop reason: HOSPADM

## 2018-09-06 RX ORDER — ONDANSETRON 4 MG/1
4 TABLET, ORALLY DISINTEGRATING ORAL
Status: DISCONTINUED | OUTPATIENT
Start: 2018-09-06 | End: 2018-09-07 | Stop reason: HOSPADM

## 2018-09-06 RX ORDER — DOCUSATE SODIUM 100 MG/1
100 CAPSULE, LIQUID FILLED ORAL 2 TIMES DAILY
Status: DISCONTINUED | OUTPATIENT
Start: 2018-09-06 | End: 2018-09-07 | Stop reason: HOSPADM

## 2018-09-06 RX ORDER — LIDOCAINE HYDROCHLORIDE 20 MG/ML
INJECTION, SOLUTION EPIDURAL; INFILTRATION; INTRACAUDAL; PERINEURAL AS NEEDED
Status: DISCONTINUED | OUTPATIENT
Start: 2018-09-06 | End: 2018-09-06 | Stop reason: HOSPADM

## 2018-09-06 RX ORDER — ACETAMINOPHEN 325 MG/1
650 TABLET ORAL
Status: DISCONTINUED | OUTPATIENT
Start: 2018-09-06 | End: 2018-09-07 | Stop reason: HOSPADM

## 2018-09-06 RX ORDER — SODIUM CHLORIDE, SODIUM LACTATE, POTASSIUM CHLORIDE, CALCIUM CHLORIDE 600; 310; 30; 20 MG/100ML; MG/100ML; MG/100ML; MG/100ML
75 INJECTION, SOLUTION INTRAVENOUS CONTINUOUS
Status: DISCONTINUED | OUTPATIENT
Start: 2018-09-06 | End: 2018-09-07

## 2018-09-06 RX ORDER — CEFAZOLIN SODIUM/WATER 2 G/20 ML
2 SYRINGE (ML) INTRAVENOUS EVERY 8 HOURS
Status: DISCONTINUED | OUTPATIENT
Start: 2018-09-06 | End: 2018-09-07 | Stop reason: HOSPADM

## 2018-09-06 RX ORDER — DEXAMETHASONE SODIUM PHOSPHATE 4 MG/ML
INJECTION, SOLUTION INTRA-ARTICULAR; INTRALESIONAL; INTRAMUSCULAR; INTRAVENOUS; SOFT TISSUE AS NEEDED
Status: DISCONTINUED | OUTPATIENT
Start: 2018-09-06 | End: 2018-09-06 | Stop reason: HOSPADM

## 2018-09-06 RX ORDER — ENOXAPARIN SODIUM 100 MG/ML
40 INJECTION SUBCUTANEOUS EVERY 24 HOURS
Status: DISCONTINUED | OUTPATIENT
Start: 2018-09-07 | End: 2018-09-07 | Stop reason: HOSPADM

## 2018-09-06 RX ORDER — HYDROMORPHONE HYDROCHLORIDE 1 MG/ML
.5-1 INJECTION, SOLUTION INTRAMUSCULAR; INTRAVENOUS; SUBCUTANEOUS
Status: DISCONTINUED | OUTPATIENT
Start: 2018-09-06 | End: 2018-09-06 | Stop reason: HOSPADM

## 2018-09-06 RX ORDER — HYDROMORPHONE HYDROCHLORIDE 2 MG/ML
0.5 INJECTION, SOLUTION INTRAMUSCULAR; INTRAVENOUS; SUBCUTANEOUS
Status: DISCONTINUED | OUTPATIENT
Start: 2018-09-06 | End: 2018-09-07 | Stop reason: HOSPADM

## 2018-09-06 RX ORDER — NEOSTIGMINE METHYLSULFATE 1 MG/ML
INJECTION INTRAVENOUS AS NEEDED
Status: DISCONTINUED | OUTPATIENT
Start: 2018-09-06 | End: 2018-09-06 | Stop reason: HOSPADM

## 2018-09-06 RX ORDER — NALOXONE HYDROCHLORIDE 0.4 MG/ML
0.4 INJECTION, SOLUTION INTRAMUSCULAR; INTRAVENOUS; SUBCUTANEOUS AS NEEDED
Status: DISCONTINUED | OUTPATIENT
Start: 2018-09-06 | End: 2018-09-07 | Stop reason: HOSPADM

## 2018-09-06 RX ORDER — ONDANSETRON 2 MG/ML
INJECTION INTRAMUSCULAR; INTRAVENOUS AS NEEDED
Status: DISCONTINUED | OUTPATIENT
Start: 2018-09-06 | End: 2018-09-06 | Stop reason: HOSPADM

## 2018-09-06 RX ORDER — ROCURONIUM BROMIDE 10 MG/ML
INJECTION, SOLUTION INTRAVENOUS AS NEEDED
Status: DISCONTINUED | OUTPATIENT
Start: 2018-09-06 | End: 2018-09-06 | Stop reason: HOSPADM

## 2018-09-06 RX ORDER — DIPHENHYDRAMINE HCL 25 MG
25 CAPSULE ORAL
Status: DISCONTINUED | OUTPATIENT
Start: 2018-09-06 | End: 2018-09-07 | Stop reason: HOSPADM

## 2018-09-06 RX ORDER — QUETIAPINE FUMARATE 25 MG/1
50 TABLET, FILM COATED ORAL
Status: DISCONTINUED | OUTPATIENT
Start: 2018-09-06 | End: 2018-09-06

## 2018-09-06 RX ORDER — HYDROMORPHONE HYDROCHLORIDE 2 MG/1
2 TABLET ORAL
Status: DISCONTINUED | OUTPATIENT
Start: 2018-09-06 | End: 2018-09-07 | Stop reason: HOSPADM

## 2018-09-06 RX ORDER — SODIUM CHLORIDE 0.9 % (FLUSH) 0.9 %
5-10 SYRINGE (ML) INJECTION AS NEEDED
Status: DISCONTINUED | OUTPATIENT
Start: 2018-09-06 | End: 2018-09-06 | Stop reason: HOSPADM

## 2018-09-06 RX ORDER — MIDAZOLAM HYDROCHLORIDE 1 MG/ML
INJECTION, SOLUTION INTRAMUSCULAR; INTRAVENOUS AS NEEDED
Status: DISCONTINUED | OUTPATIENT
Start: 2018-09-06 | End: 2018-09-06 | Stop reason: HOSPADM

## 2018-09-06 RX ORDER — CEFAZOLIN SODIUM/WATER 2 G/20 ML
2 SYRINGE (ML) INTRAVENOUS ONCE
Status: COMPLETED | OUTPATIENT
Start: 2018-09-06 | End: 2018-09-06

## 2018-09-06 RX ORDER — ONDANSETRON 2 MG/ML
4 INJECTION INTRAMUSCULAR; INTRAVENOUS AS NEEDED
Status: DISCONTINUED | OUTPATIENT
Start: 2018-09-06 | End: 2018-09-06 | Stop reason: HOSPADM

## 2018-09-06 RX ORDER — PROCHLORPERAZINE EDISYLATE 5 MG/ML
10 INJECTION INTRAMUSCULAR; INTRAVENOUS
Status: DISCONTINUED | OUTPATIENT
Start: 2018-09-06 | End: 2018-09-07 | Stop reason: HOSPADM

## 2018-09-06 RX ORDER — ALBUTEROL SULFATE 0.83 MG/ML
2.5 SOLUTION RESPIRATORY (INHALATION)
Status: DISCONTINUED | OUTPATIENT
Start: 2018-09-06 | End: 2018-09-07 | Stop reason: HOSPADM

## 2018-09-06 RX ORDER — DIAZEPAM 5 MG/1
5 TABLET ORAL
Status: DISCONTINUED | OUTPATIENT
Start: 2018-09-06 | End: 2018-09-07 | Stop reason: HOSPADM

## 2018-09-06 RX ORDER — PROPOFOL 10 MG/ML
INJECTION, EMULSION INTRAVENOUS AS NEEDED
Status: DISCONTINUED | OUTPATIENT
Start: 2018-09-06 | End: 2018-09-06 | Stop reason: HOSPADM

## 2018-09-06 RX ADMIN — ROCURONIUM BROMIDE 50 MG: 10 INJECTION, SOLUTION INTRAVENOUS at 13:02

## 2018-09-06 RX ADMIN — DEXAMETHASONE SODIUM PHOSPHATE 8 MG: 4 INJECTION, SOLUTION INTRA-ARTICULAR; INTRALESIONAL; INTRAMUSCULAR; INTRAVENOUS; SOFT TISSUE at 13:09

## 2018-09-06 RX ADMIN — FENTANYL CITRATE 25 MCG: 50 INJECTION, SOLUTION INTRAMUSCULAR; INTRAVENOUS at 15:02

## 2018-09-06 RX ADMIN — MIDAZOLAM HYDROCHLORIDE 3 MG: 1 INJECTION, SOLUTION INTRAMUSCULAR; INTRAVENOUS at 12:52

## 2018-09-06 RX ADMIN — HYDROMORPHONE HYDROCHLORIDE 2 MG: 2 TABLET ORAL at 21:05

## 2018-09-06 RX ADMIN — Medication 2 G: at 13:08

## 2018-09-06 RX ADMIN — HYDROMORPHONE HYDROCHLORIDE 2 MG: 2 TABLET ORAL at 18:07

## 2018-09-06 RX ADMIN — LIDOCAINE HYDROCHLORIDE 60 MG: 20 INJECTION, SOLUTION EPIDURAL; INFILTRATION; INTRACAUDAL; PERINEURAL at 13:00

## 2018-09-06 RX ADMIN — FENTANYL CITRATE 50 MCG: 50 INJECTION, SOLUTION INTRAMUSCULAR; INTRAVENOUS at 13:35

## 2018-09-06 RX ADMIN — FENTANYL CITRATE 50 MCG: 50 INJECTION, SOLUTION INTRAMUSCULAR; INTRAVENOUS at 15:08

## 2018-09-06 RX ADMIN — FENTANYL CITRATE 200 MCG: 50 INJECTION, SOLUTION INTRAMUSCULAR; INTRAVENOUS at 13:00

## 2018-09-06 RX ADMIN — FENTANYL CITRATE 100 MCG: 50 INJECTION, SOLUTION INTRAMUSCULAR; INTRAVENOUS at 13:41

## 2018-09-06 RX ADMIN — ONDANSETRON 4 MG: 2 INJECTION INTRAMUSCULAR; INTRAVENOUS at 14:53

## 2018-09-06 RX ADMIN — GLYCOPYRROLATE 0.3 MG: 0.2 INJECTION INTRAMUSCULAR; INTRAVENOUS at 15:02

## 2018-09-06 RX ADMIN — NEOSTIGMINE METHYLSULFATE 2 MG: 1 INJECTION INTRAVENOUS at 15:02

## 2018-09-06 RX ADMIN — Medication 2 G: at 20:41

## 2018-09-06 RX ADMIN — MIDAZOLAM HYDROCHLORIDE 2 MG: 1 INJECTION, SOLUTION INTRAMUSCULAR; INTRAVENOUS at 12:56

## 2018-09-06 RX ADMIN — DOCUSATE SODIUM 100 MG: 100 CAPSULE, LIQUID FILLED ORAL at 18:07

## 2018-09-06 RX ADMIN — PROCHLORPERAZINE EDISYLATE 10 MG: 5 INJECTION INTRAMUSCULAR; INTRAVENOUS at 21:24

## 2018-09-06 RX ADMIN — Medication 10 ML: at 21:05

## 2018-09-06 RX ADMIN — SODIUM CHLORIDE, SODIUM LACTATE, POTASSIUM CHLORIDE, AND CALCIUM CHLORIDE: 600; 310; 30; 20 INJECTION, SOLUTION INTRAVENOUS at 13:44

## 2018-09-06 RX ADMIN — PROPOFOL 200 MG: 10 INJECTION, EMULSION INTRAVENOUS at 13:02

## 2018-09-06 RX ADMIN — SODIUM CHLORIDE, SODIUM LACTATE, POTASSIUM CHLORIDE, AND CALCIUM CHLORIDE 125 ML/HR: 600; 310; 30; 20 INJECTION, SOLUTION INTRAVENOUS at 11:33

## 2018-09-06 NOTE — ANESTHESIA POSTPROCEDURE EVALUATION
Post-Anesthesia Evaluation and Assessment Patient: Antolin Nava MRN: [id number]  SSN: [id number] YOB: 1995  Age: 21 y.o. Sex: female Cardiovascular Function/Vital Signs Visit Vitals  /69  Pulse 76  Temp 36.4 °C (97.6 °F)  Resp (!) 0  
 Ht 5' 9\" (1.753 m)  Wt 114.9 kg (253 lb 4.9 oz)  SpO2 97%  BMI 37.41 kg/m2 Patient is status post general anesthesia for Procedure(s): BILATERAL BREAST REDUCTION WITH FREE NIPPLE GRAFT, LIPOSUCTION TO CHEST WALL (LATEX ALLERGY). Nausea/Vomiting: None Postoperative hydration reviewed and adequate. Pain: 
Pain Scale 1: Visual (09/06/18 1617) Pain Intensity 1: 0 (09/06/18 1617) Managed Neurological Status:  
Neuro (WDL): Exceptions to WDL (09/06/18 1528) Neuro Neurologic State: Drowsy; Pharmacologically induced (comment) (09/06/18 1528) At baseline Mental Status and Level of Consciousness: Arousable Pulmonary Status:  
O2 Device: Nasal cannula (09/06/18 1613) Adequate oxygenation and airway patent Complications related to anesthesia: None Post-anesthesia assessment completed. No concerns Signed By: Sonido Montez MD   
 September 6, 2018

## 2018-09-06 NOTE — IP AVS SNAPSHOT
[address] 
[phone] Patient: Santiago Ruffin MRN: [id number] :1995 A check donna indicates which time of day the medication should be taken. My Medications CONTINUE taking these medications Instructions Each Dose to Equal  
 Morning Noon Evening Bedtime  
 albuterol 90 mcg/actuation inhaler Commonly known as:  PROVENTIL HFA, VENTOLIN HFA, PROAIR HFA Notes to Patient:  Did not take while in hospital. Resume home schedule Take 2 Puffs by inhalation every six (6) hours as needed for Wheezing. 2 Puff ALLEGRA-D 12 HOUR  mg Tb12 Generic drug:  fexofenadine-pseudoephedrine Notes to Patient:  Not taken while in hospital. Resume previous schedule Take 1 Tab by mouth daily. 1 Tab ALPRAZolam 0.5 mg tablet Commonly known as:  Warren Sox Notes to Patient:  Did not take while in hospital. Resume home schedule Take 1 Tab by mouth two (2) times daily as needed for Anxiety. Max Daily Amount: 1 mg. Indications: ANXIETY WITH DEPRESSION  
 0.5 mg Desvenlafaxine 100 mg Tb24 Notes to Patient:  Not taken in hospital. Resume home schedule Take 100 mg by mouth daily. 100 mg  
    
   
   
   
  
 testosterone cypionate 200 mg/mL injection Commonly known as:  DEPOTESTOTERONE CYPIONATE Notes to Patient:  Did not take whlie in hospital. Resume home schedule INJECT 0.5 ML INTO THE MUSCLE Q WEEK  
     
   
   
   
  
 VITAMIN D3 1,000 unit Cap Generic drug:  cholecalciferol Notes to Patient:  Did not take in hospital. Resume home schedule Take 1,000 Units by mouth daily. 1000 Units STOP taking these medications   
 azithromycin 250 mg tablet Commonly known as:  ZITHROMAX  
   
  
 predniSONE 10 mg dose pack Commonly known as:  STERAPRED DS

## 2018-09-06 NOTE — ROUTINE PROCESS
TRANSFER - OUT REPORT: 
 
Verbal report given to [facility], RN(name) on Saadia Heading  being transferred to [facility](unit) for routine post - op Report consisted of patients Situation, Background, Assessment and  
Recommendations(SBAR). Information from the following report(s) SBAR and MAR was reviewed with the receiving nurse. Opportunity for questions and clarification was provided. Patient transported with: 
 O2 @ 2 liters Registered Nurse

## 2018-09-06 NOTE — H&P
History and Physical 
 
Patient is a 21 y.o. well-developed, well nourished female who presents for bilateral FTM Top Surgery with liposuction. Past Medical History:  
Diagnosis Date  Anxiety  Migraines 2007  
 patient states no longer having migraines as of 8/15/18  Mild depression ([facility] [address] 3/10/2015  Obesity 2007  RAD (reactive airway disease) [phone]  
 patient denies having this currently as of 8/15/18  Rhinitis 2005 Allergic & Seasonal  
 
Past Surgical History:  
Procedure Laterality Date  HX OTHER SURGICAL Right 4/2013  
 had piece of bone removed from right arm Prior to Admission medications Medication Sig Start Date End Date Taking? Authorizing Provider  
fexofenadine-pseudoephedrine (ALLEGRA-D 12 HOUR)  mg Tb12 Take 1 Tab by mouth daily. Yes Historical Provider Desvenlafaxine 100 mg Tb24 Take 100 mg by mouth daily. 5/9/18  Yes Historical Provider QUEtiapine (SEROQUEL) 50 mg tablet Take 50 mg by mouth nightly as needed. 5/9/18  Yes Historical Provider  
testosterone cypionate (DEPOTESTOTERONE CYPIONATE) 200 mg/mL injection INJECT 0.5 ML INTO THE MUSCLE Q WEEK 11/22/17  Yes Historical Provider Cholecalciferol, Vitamin D3, (VITAMIN D3) 1,000 unit Cap Take 1,000 Units by mouth daily. Yes Historical Provider  
albuterol (PROVENTIL HFA, VENTOLIN HFA, PROAIR HFA) 90 mcg/actuation inhaler Take 2 Puffs by inhalation every six (6) hours as needed for Wheezing. 8/24/18   Isis Salas MD  
predniSONE (STERAPRED DS) 10 mg dose pack Use following package instructions. 8/24/18   Isis Salas MD  
ALPRAZolam Waylan Brian) 0.5 mg tablet Take 1 Tab by mouth two (2) times daily as needed for Anxiety. Max Daily Amount: 1 mg. Indications: ANXIETY WITH DEPRESSION 8/21/18   Isis Salas MD  
azithromycin (ZITHROMAX) 250 mg tablet Take 2 tabs by mouth on day 1 and 1 tab daily on days 2-5. 8/21/18   Isis Salas MD  
 
Allergies Allergen Reactions  Latex Hives General:   No apparent distress. Heart:  Regular rate and rhythm without murmurs or rubs. Lungs:  Clear to ausculation bilaterally; no wheezes, rales or rhonchi. Patient is ready to go to surgery. Gladis Casper MD 
9/6/2018

## 2018-09-06 NOTE — ANESTHESIA PREPROCEDURE EVALUATION
Anesthetic History No history of anesthetic complications Review of Systems / Medical History Patient summary reviewed and pertinent labs reviewed Pulmonary Comments: Seasonal allergies Reactive Airway Disease Neuro/Psych Headaches and psychiatric history (anxiety/depression) Cardiovascular Within defined limits Exercise tolerance: >4 METS 
  
GI/Hepatic/Renal 
Within defined limits Endo/Other Within defined limits Other Findings Physical Exam 
 
Airway Mallampati: I 
TM Distance: > 6 cm Neck ROM: normal range of motion Mouth opening: Normal 
 
 Cardiovascular Rhythm: regular Rate: normal 
 
 
 
 Dental 
 
Dentition: Upper dentition intact and Lower dentition intact Pulmonary Breath sounds clear to auscultation Abdominal 
 
 
 
 Other Findings Anesthetic Plan ASA: 2 Anesthesia type: general 
 
 
 
 
Induction: Intravenous Anesthetic plan and risks discussed with: Patient

## 2018-09-07 VITALS
BODY MASS INDEX: 37.52 KG/M2 | TEMPERATURE: 98 F | DIASTOLIC BLOOD PRESSURE: 69 MMHG | RESPIRATION RATE: 18 BRPM | HEIGHT: 69 IN | WEIGHT: 253.31 LBS | SYSTOLIC BLOOD PRESSURE: 115 MMHG | OXYGEN SATURATION: 99 % | HEART RATE: 100 BPM

## 2018-09-07 PROCEDURE — 99218 HC RM OBSERVATION: CPT

## 2018-09-07 PROCEDURE — 74011250636 HC RX REV CODE- 250/636: Performed by: PHYSICIAN ASSISTANT

## 2018-09-07 PROCEDURE — 94762 N-INVAS EAR/PLS OXIMTRY CONT: CPT

## 2018-09-07 PROCEDURE — 74011250637 HC RX REV CODE- 250/637: Performed by: PHYSICIAN ASSISTANT

## 2018-09-07 RX ADMIN — SODIUM CHLORIDE, SODIUM LACTATE, POTASSIUM CHLORIDE, AND CALCIUM CHLORIDE 75 ML/HR: 600; 310; 30; 20 INJECTION, SOLUTION INTRAVENOUS at 04:42

## 2018-09-07 RX ADMIN — Medication 2 G: at 03:58

## 2018-09-07 RX ADMIN — DOCUSATE SODIUM 100 MG: 100 CAPSULE, LIQUID FILLED ORAL at 09:36

## 2018-09-07 RX ADMIN — ENOXAPARIN SODIUM 40 MG: 40 INJECTION, SOLUTION INTRAVENOUS; SUBCUTANEOUS at 06:38

## 2018-09-07 RX ADMIN — PROCHLORPERAZINE EDISYLATE 10 MG: 5 INJECTION INTRAMUSCULAR; INTRAVENOUS at 03:59

## 2018-09-07 NOTE — PROGRESS NOTES
Bedside and Verbal shift change report given to [address] [place] [address] (oncoming nurse) by Jimbo Betts RN (offgoing nurse). Report included the following information SBAR, Kardex, Procedure Summary and MAR.

## 2018-09-07 NOTE — PROGRESS NOTES
No scripts handed to patient while in hospital. Nurse handed mother a copy of discharge instructions. All instructions were read and explained to patient and her mother. Stated she knew how to drain and record TAMELA output. Verbalized understanding

## 2018-09-07 NOTE — DISCHARGE INSTRUCTIONS
Top Surgery Patient Instructions    Immediately Following Surgery:    After surgery you will rest quietly for the first 48 hours. You will be able to walk around the house and perform light daily activities; however, during this time, it is not at all unusual for you to feel some pain, soreness and pressure in the chest area. This will gradually subside and Dr. Citlaly Acosta will give you pain medication to relieve it. You must take the entire prescription of antibiotics. Be sure to lie on your back whenever you rest or sleep. Keep your head elevated for 72 hours after surgery as this will help with swelling. The surgery binder that you have been put in should be worn constantly during the day and at night. You will then be allowed to shower two days after surgery. [place] yourself everywhere, including the tapes and your incisions. Use mild soap and pat yourself dry and put the binder back on. This daily routine will help keep the incisions clean, and will promote wound healing. Do not submerge yourself in a bath, swimming pool, or whirlpool for four weeks. You will wear the chest binder for a total of two to three weeks after surgery. The small tape strips covering your incisions. These will remain in place for seven days and will peel off by themselves. A few patients react to the anesthetic after surgery with nausea and vomiting. This usually lasts less than 24 hours and should be treated with lots of fluids. Dr. Citlaly Acosta will prescribe nausea medicine for during your preoperative visit. The maximum swelling occurs at about three days and then begins to dramatically improve. Mild bruising typically resolves within 14 days. You should plan to be off work for 1-2 weeks, although this can vary from person to person. Additional Post-Operative Instructions:    No heavy exercise or lifting for three weeks following surgery.  For the first three days following surgery you should try to restrict your arm movements. Move your arms slowly and avoid sudden jerky movements of the chest and breast area. Keep your arms as close to your body as possible. Dr. Latasha Almeida encourages walking immediately after surgery. This activity will greatly minimize the risk of blood clots in your leg veins. Do not expose your breasts to the sun for eight weeks after surgery. Please notify Dr. Latasha Almeida if:   If your one breast becomes significantly larger than the other   If you develop significant bruising across the chest    If you experience a significant increase in pain in the breast after the pain has improved   If you develop a temperature above 101.5° F   If you develop redness (like a sunburn) around your incisions  If you need help or have any questions feel free to call Dr Latasha Almeida with your concerns. Dr. Latasha Almeida is on call 24 hours per day, seven days per week and has an answering service to forward calls. The quality of your cosmetic enhancement may be compromised if you fail to return for any scheduled post-op visits, or follow the pre- and post-operative instructions. Please dont hesitate to report any unusual or concerning changes. Our number is [phone]. Start anti biotics this evening     Colace given at 09:36     Lovenox given at 06:38     Dilaudid given 9/7/18 in evening.   May take today as needed for pain

## 2018-09-07 NOTE — PROGRESS NOTES
POD #1 Patient doing well. Pain controlled with PCA. Tolerating liquid diet. VSS. Afebrile Chest soft bilaterally. Incisions clean, dry, and intact. Maria Victoria dressings intact. Drains Serosangiouness 1) D/C when ready 2) Advance diet 3) OOB 4) D/C PCA 
5) TAMELA drain care

## 2018-09-08 NOTE — OP NOTES
[facility]  OPERATIVE REPORT    Guillermina Bolton  MR#: [id number]  : 1995  ACCOUNT #: [de-identified]   DATE OF SERVICE: 2018    PROCEDURE PERFORMED:  1.  Bilateral total simple mastectomy for female to male gender confirmation. 2.  Creation superior pedicle fasciocutaneous flap, right and left chest.  3.  Bilateral free nipple grafting. 4.  Application of vacuum-assisted wound closure device, right and left chest wall. 5.  Tumescent liposuction bilateral chest wall. PREOPERATIVE DIAGNOSIS:  Gender identity dysphoria. POSTOPERATIVE DIAGNOSIS:  Gender identity dysphoria. SURGEON:  Rober Dixon MD    ASSISTANT:  Esther Jordan PA-C. Due to the complexity of this procedure, surgical assistance was required. ANESTHESIA:  General endotracheal.    ESTIMATED BLOOD LOSS:  100 mL    DRAINS:  A 19-Swazi Cameron x2. SPECIMENS REMOVED:  1. Total excised tissue, right breast 1230 grams. 2.  Total excised tissue, left breast 1475 grams. 3.  Total aspirate liposuction 500 mL    IMPLANTS:  None. COMPLICATIONS:  None. COUNTS:  Correct x2. DISPOSITION:  Stable to PACU.    BRIEF HISTORY:  The patient is a 35-year-old male undergoing bilateral double incision top surgery with free nipple grafting as well as liposuction of the lateral chest wall for gender confirmation. The overall procedure, incisional approach, expected outcomes and recovery were discussed. The risks, benefits and alternatives to the procedure including but not limited to bleeding, infection, damage to surrounding tissue structure, the need for revisional surgery, seroma, hematoma, skin flap loss, fat necrosis, partial or total loss of the nipple grafts, hypertrophic scarring, asymmetry were discussed. PE, DVT and fat embolism were discussed.   The patient understands that he will have some residual skin laxity and fatty tissue along the lateral chest wall as well as potential dog ears due to his weight and body habitus. He also understands the sequelae of free nipple grafting including loss of texture and projection, depigmentation and loss of sensation. He understands these risks and agreed to proceed. PROCEDURE NOTE:  Preoperative markings were made with the patient in the standing position and informed consent was obtained. The patient was taken to the operating room and placed supine on the operating table. Sequential compression boots were placed. General endotracheal anesthesia was obtained. A lower body Shirley Hugger was placed. The chest wall and breast were prepped and draped in the usual sterile fashion. The preoperative markings were injected with 40 mL of 0.25% lidocaine with 1:400,000 epinephrine. A single liposuction port site was created along the lateral inframammary fold bilaterally. The right and left lateral chest walls were then infused with 1000 mL of standard tumescent solution consisting of 1 liter of warm lactated Ringer's mixed with 1 ampule of epinephrine and 10 mL of 2% lidocaine plain using Lamisil infiltrating cannula. After 7 minutes, tumescent liposuction was performed with a 5.0 mm blunt Mercedes tipped layered cannula in the deep and intermediate planes. Cross tunneling was performed. A total of 500 mL of aspirate was removed from each side improving the contour. Bilateral circumareolar incisions were designed with the nipple on moderate stretch using a 22 mm cookie cutter. Both circumareolar incisions were then made sharply and the right and left nipple areolar complexes are harvested as full thickness skin grafts, debrided of deep tissue, central pie cuts were made and the grafts were placed in saline-soaked gauze for later grafting. The right inframammary incision was made sharply. Dissection carried down to the subcutaneous tissue to the level of Rosa's fascia.   A superiorly based fasciocutaneous breast flap was then raised to the level of the clavicle superiorly, medially to the parasternal line and laterally to the anterior axillary line. The flap was then transposed inferiorly. The upper resection line was confirmed and marked superior to the nipple areolar complex. The upper resection line was confirmed to close without undue tension, but providing an excellent contour. The upper resection line was then incised sharply and skin, subcutaneous tissue and breast parenchyma was removed using the plasma blade beveling the incision in a superior fashion to provide a smooth contour of the upper skin flap. The right tissue was then weighed and passed off the operating table and weighed 1230 grams. Hemostasis was secured throughout with 2-0 Vicryl stick ties and electrocautery. The entire wound bed was then irrigated with 2 liters of triple antibiotic solution. A #19 [place] drain was brought through a lateral stab incision and secured to the skin with 3-0 nylon and placed within the wound bed. The superior fasciocutaneous skin flap was transposed inferiorly to its new anatomic position and tailor tacked closed with surgical staples. The wound was then definitively closed with a running 2-0 PDS deep dermal suture and a running subcuticular 3-0 Monocryl on the skin. The exact same procedure was repeated on the contralateral left side for a total resection of 1475 grams. The free nipple grafts were then placed in their new more masculinized position. The new nipple areolar complex apertures were marked with a 22 mm cookie cutter. Measurements were confirmed with sternal notch to nipple and midline to nipple distances. The apertures were marked, incised and deepithelialized and the right and left free nipple grafts were then placed on the respective chest walls and secured in place with a running circumferential 5-0 Monocryl suture. The inframammary incisions were then dressed with Dermabond.   The nipples were then dressed with bacitracin, Adaptic. The vacuum-assisted wound closure device sponge trimmed to conform to the nipple graft followed by the [facility] wound closure device. The suction held well and the compression binder was placed. Anesthesia was then discontinued. The patient extubated in the operating room having tolerated the procedure well. The   needle, instrument and laparotomy pad counts at the end of the case were correct.       MD Kitty Morrow [place] / Sheilla Gottron  D: 09/08/2018 11:41     T: 09/08/2018 12:21  JOB #: [id number]

## 2018-11-20 ENCOUNTER — OFFICE VISIT (OUTPATIENT)
Dept: INTERNAL MEDICINE CLINIC | Facility: CLINIC | Age: 23
End: 2018-11-20

## 2018-11-20 VITALS
HEART RATE: 86 BPM | TEMPERATURE: 99 F | WEIGHT: 263 LBS | RESPIRATION RATE: 16 BRPM | BODY MASS INDEX: 38.95 KG/M2 | DIASTOLIC BLOOD PRESSURE: 69 MMHG | OXYGEN SATURATION: 99 % | HEIGHT: 69 IN | SYSTOLIC BLOOD PRESSURE: 120 MMHG

## 2018-11-20 DIAGNOSIS — R53.83 FATIGUE, UNSPECIFIED TYPE: Primary | ICD-10-CM

## 2018-11-20 DIAGNOSIS — E55.9 VITAMIN D DEFICIENCY: ICD-10-CM

## 2018-11-20 DIAGNOSIS — E66.01 SEVERE OBESITY (HCC): ICD-10-CM

## 2018-11-20 DIAGNOSIS — I73.00 RAYNAUD'S PHENOMENON WITHOUT GANGRENE: ICD-10-CM

## 2018-11-20 DIAGNOSIS — Z78.9 FEMALE-TO-MALE TRANSGENDER PERSON: ICD-10-CM

## 2018-11-20 DIAGNOSIS — F42.9 OBSESSIVE-COMPULSIVE DISORDER, UNSPECIFIED TYPE: ICD-10-CM

## 2018-11-20 DIAGNOSIS — R51.9 DAILY HEADACHE: ICD-10-CM

## 2018-11-20 DIAGNOSIS — F33.1 MODERATE EPISODE OF RECURRENT MAJOR DEPRESSIVE DISORDER (HCC): ICD-10-CM

## 2018-11-20 DIAGNOSIS — F41.0 PANIC DISORDER: ICD-10-CM

## 2018-11-20 PROBLEM — F64.0 GENDER DYSPHORIA IN ADULT: Status: RESOLVED | Noted: 2017-09-26 | Resolved: 2018-11-20

## 2018-11-20 RX ORDER — MINERAL OIL
1 ENEMA (ML) RECTAL DAILY
COMMUNITY

## 2018-11-20 NOTE — PROGRESS NOTES
[address] [place] [address]  Identified pt with two pt identifiers(name and ). Chief Complaint Patient presents with  Depression  
  follow up  Anxiety  Fatigue  
  with headache 1. Have you been to the ER, urgent care clinic since your last visit? Hospitalized since your last visit? NO 
 
2. Have you seen or consulted any other health care providers outside of the [address] [place] [address] since your last visit? Include any pap smears or colon screening. NO Today's provider has been notified of reason for visit, vitals and flowsheets obtained on patients. Patient received paperwork for advance directive during previous visit but has not completed at this time Reviewed record In preparation for visit, huddled with provider and have obtained necessary documentation Health Maintenance Due Topic  Influenza Age 5 to Adult Wt Readings from Last 3 Encounters:  
18 263 lb (119.3 kg) 18 253 lb 4.9 oz (114.9 kg) 18 257 lb 12.8 oz (116.9 kg) Temp Readings from Last 3 Encounters:  
18 99 °F (37.2 °C)  
18 98 °F (36.7 °C)  
18 98.8 °F (37.1 °C) (Oral) BP Readings from Last 3 Encounters:  
18 120/69  
18 115/69  
18 128/78 Pulse Readings from Last 3 Encounters:  
18 86  
18 100  
18 94 Vitals:  
 18 8069 BP: 120/69 Pulse: 86 Resp: 16 Temp: 99 °F (37.2 °C) SpO2: 99% Weight: 263 lb (119.3 kg) Height: 5' 9\" (1.753 m) PainSc:   1 Learning Assessment: 
:  
 
Learning Assessment 3/10/2015 PRIMARY LEARNER Patient HIGHEST LEVEL OF EDUCATION - PRIMARY LEARNER  2 YEARS OF COLLEGE  
BARRIERS PRIMARY LEARNER NONE  
CO-LEARNER CAREGIVER No  
PRIMARY LANGUAGE ENGLISH  
LEARNER PREFERENCE PRIMARY LISTENING  
ANSWERED BY patient RELATIONSHIP SELF Depression Screening: 
:  
 
PHQ over the last two weeks 3/10/2015 Little interest or pleasure in doing things Nearly every day Feeling down, depressed, irritable, or hopeless Nearly every day Total Score PHQ 2 6 Trouble falling or staying asleep, or sleeping too much Not at all Feeling tired or having little energy Several days Poor appetite, weight loss, or overeating Not at all Feeling bad about yourself - or that you are a failure or have let yourself or your family down Not at all Trouble concentrating on things such as school, work, reading, or watching TV Not at all Moving or speaking so slowly that other people could have noticed; or the opposite being so fidgety that others notice Several days Thoughts of being better off dead, or hurting yourself in some way Not at all PHQ 9 Score 8 How difficult have these problems made it for you to do your work, take care of your home and get along with others Somewhat difficult Fall Risk Assessment: 
:  
 
No flowsheet data found. Abuse Screening: 
:  
 
No flowsheet data found. ADL Screening: 
:  
 
ADL Assessment 4/30/2018 Feeding yourself No Help Needed Getting from bed to chair No Help Needed Getting dressed No Help Needed Bathing or showering No Help Needed Walk across the room (includes cane/walker) No Help Needed Using the telphone No Help Needed Taking your medications No Help Needed Preparing meals No Help Needed Managing money (expenses/bills) No Help Needed Moderately strenuous housework (laundry) No Help Needed Shopping for personal items (toiletries/medicines) No Help Needed Shopping for groceries No Help Needed Driving No Help Needed Climbing a flight of stairs No Help Needed Getting to places beyond walking distances No Help Needed Medication reconciliation up to date and corrected with patient at this time.

## 2018-11-20 NOTE — PROGRESS NOTES
CC:  
Chief Complaint Patient presents with  Depression  
  follow up  Anxiety  Fatigue  
  with headache HISTORY OF PRESENT ILLNESS Ellin Hamman is a 21 y.o. male. Presents for 6 month follow up evaluation. He is a female-to-male transgender and has major depression, panic disorder, allergic rhinitis, and obesity. Underwent bilateral breast reduction surgery on 9/6/18. Receives testosterone injections at [facility]; testosterone levels followed there. Today he complains of fatigue and headache. Sleeps 8-9 hrs a night from about 10 pm -7:30 pm but awakes not feeling well rested. Works 4 hr shifts, comes home feeling tired, naps for about 5 hrs after work. Denies snoring or daytime somnolence. Has been under increased stress. Having daily headaches, located at right side of forehead, no aura, no associated photophobia or nausea, has associated eye twitching; usually takes nothing for it, sometimes Tylenol. Walks at work but no exercise outside of work. Reports depression and anxiety are generally controlled on Pristiq. Not having much anxiety. Takes Xanax as needed; last used 2 weeks ago. Recently diagnosed with OCD. Dr. Chelo Cox (Psychiatry) is moving to out of [facility]; he is scheduled to see a new psychiatrist at [facility] in Jan and a OCD specialist next month. Still following with a therapist.  
 
On Allegra 180 mg daily for allergic rhinitis and to prevent against hives. Having fingers changing colors in the cold from Raynaud's phenomenon. Other Providers: Wendy Rueda ([facility]) 
   
Soc Hx Single. Lives with mother. No children. Works at the [employer]. Never smoker. Denies alcohol or recreational drug use.  Is not sexually active. Is female-to-male transgender. 
Allison Parekh A complete review of systems was performed and is negative except for those mentioned in the HPI.  
 
 
Patient Active Problem List  
 Diagnosis Code  Obesity (BMI 35.0-39.9 without comorbidity) E66.9  Moderate episode of recurrent major depressive disorder (HCC) F33.1  Raynaud's phenomenon I73.00  Allergic rhinitis J30.9  Panic disorder F41.0  Gender dysphoria in adult F64.0  Severe obesity (BMI 35.0-39. 9) E66.01  
 Gender identity disorder F64.9 Past Medical History:  
Diagnosis Date  Anxiety  Migraines 2007  
 patient states no longer having migraines as of 8/15/18  Mild depression ([address] 3/10/2015  Obesity 2007  RAD (reactive airway disease) 4437,5325  
 patient denies having this currently as of 8/15/18  Rhinitis 2005 Allergic & Seasonal  
 
Allergies Allergen Reactions  Latex Hives Current Outpatient Medications Medication Sig Dispense Refill  ALPRAZolam (XANAX) 0.5 mg tablet Take 1 Tab by mouth two (2) times daily as needed for Anxiety. Max Daily Amount: 1 mg. Indications: ANXIETY WITH DEPRESSION 30 Tab 0  
 fexofenadine-pseudoephedrine (ALLEGRA-D 12 HOUR)  mg Tb12 Take 1 Tab by mouth daily.  Desvenlafaxine 100 mg Tb24 Take 100 mg by mouth daily.  testosterone cypionate (DEPOTESTOTERONE CYPIONATE) 200 mg/mL injection INJECT 0.5 ML INTO THE MUSCLE Q WEEK  0  
 Cholecalciferol, Vitamin D3, (VITAMIN D3) 1,000 unit Cap Take 1,000 Units by mouth daily. PHYSICAL EXAM 
Visit Vitals /69 (BP 1 Location: Left arm, BP Patient Position: Sitting) Pulse 86 Temp 99 °F (37.2 °C) Resp 16 Ht 5' 9\" (1.753 m) Wt 263 lb (119.3 kg) SpO2 99% BMI 38.84 kg/m² General: Obese, no distress. HEENT:  Head normocephalic/atraumatic, no scleral icterus. Has facial hair with a light beard. Neck: Supple. No carotid bruits, JVD, lymphadenopathy, or thyromegaly. Lungs:  Clear to ausculation bilaterally. Good air movement. Heart:  Regular rate and rhythm, normal S1 and S2, no murmur, gallop, or rub Extremities: No clubbing, cyanosis, or edema. Neurological: Alert and oriented. Psychiatric: Normal mood and affect. Behavior is normal.  
 
Results for orders placed or performed during the hospital encounter of 09/06/18 HCG URINE, QL. - POC Result Value Ref Range Pregnancy test,urine (POC) NEGATIVE  NEG    
 
 
 
ASSESSMENT AND PLAN 
  ICD-10-CM ICD-9-CM 1. Fatigue, unspecified type Z10.75 396.16 METABOLIC PANEL, COMPREHENSIVE  
   CBC WITH AUTOMATED DIFF  
   HEMOGLOBIN A1C WITH EAG  
   TSH RFX ON ABNORMAL TO FREE T4  
2. Moderate episode of recurrent major depressive disorder (HCC) F33.1 296.32   
3. Panic disorder F41.0 300.01   
4. Daily headache R51 784.0 5. Severe obesity ([address] E66.01 278.01   
6. Raynaud's phenomenon without gangrene I73.00 443.0 KAMLESH W/REFLEX 7. Obsessive-compulsive disorder, unspecified type F42.9 300.3 8. Vitamin D deficiency E55.9 268.9 VITAMIN D, 25 HYDROXY 9. Female-to-male transgender person F64.0 36.80 Diagnoses and all orders for this visit: 
 
1. Fatigue, unspecified type May be secondary to increased stress, oversleeping, or depression. Rule out electrolyte abnormality, anemia, DM, or thyroid disease. Also consider sleep apnea. Plan referral to Sleep Medicine if labs unrevealing. 
-     METABOLIC PANEL, COMPREHENSIVE 
-     CBC WITH AUTOMATED DIFF 
-     HEMOGLOBIN A1C WITH EAG 
-     TSH RFX ON ABNORMAL TO FREE T4 
 
2. Moderate episode of recurrent major depressive disorder (HCC) Stable. Continue Pristiq and counseling. 3. Panic disorder 4. Daily headache Likely due to stress. 5. Severe obesity ([address] Discussed the patient's BMI with him. The BMI follow up plan is as follows: dietary management education, guidance, and counseling; encourage exercise; monitor weight; prescribed dietary intake. Follow up BMI in 3 months. 6. Raynaud's phenomenon without gangrene -     KAMLESH W/REFLEX 7. Obsessive-compulsive disorder, unspecified type 8. Vitamin D deficiency -     VITAMIN D, 25 HYDROXY 9. Female-to-male transgender person Follow-up Disposition: 
Return in about 3 months (around 2/20/2019), or if symptoms worsen or fail to improve, for Fatigue, weight. I have discussed the diagnosis with the patient and the intended plan as seen in the above orders. Patient is in agreement. The patient has received an after-visit summary and questions were answered concerning future plans. I have discussed medication side effects and warnings with the patient as well.

## 2018-11-20 NOTE — PATIENT INSTRUCTIONS
Fatigue: Care Instructions Your Care Instructions Fatigue is a feeling of tiredness, exhaustion, or lack of energy. You may feel fatigue because of too much or not enough activity. It can also come from stress, lack of sleep, boredom, and poor diet. Many medical problems, such as viral infections, can cause fatigue. Emotional problems, especially depression, are often the cause of fatigue. Fatigue is most often a symptom of another problem. Treatment for fatigue depends on the cause. For example, if you have fatigue because you have a certain health problem, treating this problem also treats your fatigue. If depression or anxiety is the cause, treatment may help. Follow-up care is a key part of your treatment and safety. Be sure to make and go to all appointments, and call your doctor if you are having problems. It's also a good idea to know your test results and keep a list of the medicines you take. How can you care for yourself at home? · Get regular exercise. But don't overdo it. Go back and forth between rest and exercise. · Get plenty of rest. 
· Eat a healthy diet. Do not skip meals, especially breakfast. 
· Reduce your use of caffeine, tobacco, and alcohol. Caffeine is most often found in coffee, tea, cola drinks, and chocolate. · Limit medicines that can cause fatigue. This includes tranquilizers and cold and allergy medicines. When should you call for help? Watch closely for changes in your health, and be sure to contact your doctor if: 
  · You have new symptoms such as fever or a rash.  
  · Your fatigue gets worse.  
  · You have been feeling down, depressed, or hopeless. Or you may have lost interest in things that you usually enjoy.  
  · You are not getting better as expected. Where can you learn more? Go to http://william-kory.info/. Enter C040 in the search box to learn more about \"Fatigue: Care Instructions. \" Current as of: November 20, 2017 Content Version: 11.8 © 6262-9142 [employer]. Care instructions adapted under license by [employer] (which disclaims liability or warranty for this information). If you have questions about a medical condition or this instruction, always ask your healthcare professional. [address] any warranty or liability for your use of this information.

## 2018-11-21 LAB
25(OH)D3+25(OH)D2 SERPL-MCNC: 23.7 NG/ML (ref 30–100)
ALBUMIN SERPL-MCNC: 4.3 G/DL (ref 3.5–5.5)
ALBUMIN/GLOB SERPL: 1.3 {RATIO} (ref 1.2–2.2)
ALP SERPL-CCNC: 106 IU/L (ref 39–117)
ALT SERPL-CCNC: 29 IU/L (ref 0–44)
ANA SER QL: NEGATIVE
AST SERPL-CCNC: 26 IU/L (ref 0–40)
BASOPHILS # BLD AUTO: 0 X10E3/UL (ref 0–0.2)
BASOPHILS NFR BLD AUTO: 0 %
BILIRUB SERPL-MCNC: 0.2 MG/DL (ref 0–1.2)
BUN SERPL-MCNC: 8 MG/DL (ref 6–20)
BUN/CREAT SERPL: 9 (ref 9–20)
CALCIUM SERPL-MCNC: 9.6 MG/DL (ref 8.7–10.2)
CHLORIDE SERPL-SCNC: 100 MMOL/L (ref 96–106)
CO2 SERPL-SCNC: 25 MMOL/L (ref 20–29)
CREAT SERPL-MCNC: 0.94 MG/DL (ref 0.76–1.27)
EOSINOPHIL # BLD AUTO: 0.1 X10E3/UL (ref 0–0.4)
EOSINOPHIL NFR BLD AUTO: 1 %
ERYTHROCYTE [DISTWIDTH] IN BLOOD BY AUTOMATED COUNT: 15.5 % (ref 12.3–15.4)
EST. AVERAGE GLUCOSE BLD GHB EST-MCNC: 103 MG/DL
GLOBULIN SER CALC-MCNC: 3.3 G/DL (ref 1.5–4.5)
GLUCOSE SERPL-MCNC: 84 MG/DL (ref 65–99)
HBA1C MFR BLD: 5.2 % (ref 4.8–5.6)
HCT VFR BLD AUTO: 44.3 % (ref 37.5–51)
HGB BLD-MCNC: 14.7 G/DL (ref 13–17.7)
IMM GRANULOCYTES # BLD: 0 X10E3/UL (ref 0–0.1)
IMM GRANULOCYTES NFR BLD: 0 %
LYMPHOCYTES # BLD AUTO: 2.1 X10E3/UL (ref 0.7–3.1)
LYMPHOCYTES NFR BLD AUTO: 32 %
MCH RBC QN AUTO: 30.2 PG (ref 26.6–33)
MCHC RBC AUTO-ENTMCNC: 33.2 G/DL (ref 31.5–35.7)
MCV RBC AUTO: 91 FL (ref 79–97)
MONOCYTES # BLD AUTO: 0.4 X10E3/UL (ref 0.1–0.9)
MONOCYTES NFR BLD AUTO: 6 %
NEUTROPHILS # BLD AUTO: 4 X10E3/UL (ref 1.4–7)
NEUTROPHILS NFR BLD AUTO: 61 %
PLATELET # BLD AUTO: 363 X10E3/UL (ref 150–379)
POTASSIUM SERPL-SCNC: 4.8 MMOL/L (ref 3.5–5.2)
PROT SERPL-MCNC: 7.6 G/DL (ref 6–8.5)
RBC # BLD AUTO: 4.87 X10E6/UL (ref 4.14–5.8)
SODIUM SERPL-SCNC: 141 MMOL/L (ref 134–144)
TSH SERPL DL<=0.005 MIU/L-ACNC: 2.29 UIU/ML (ref 0.45–4.5)
WBC # BLD AUTO: 6.7 X10E3/UL (ref 3.4–10.8)

## 2018-11-21 NOTE — PROGRESS NOTES
Message sent to patient by My Chart: 
Your vitamin D level was a little low. Otherwise, your labs all returned normal.  This includes normal kidney and liver tests, blood counts, thyroid, diabetes screening test, and test for autoimmune disorders. None of the test results explain why you are tired. I think the next step is to be checked for sleep apnea. Let me know if it is okay for me to place a referral for you to see a doctor about testing for sleep apnea. Dr. Sabiha Hillman

## 2018-12-28 PROBLEM — F40.00 AGORAPHOBIA, UNSPECIFIED: Status: ACTIVE | Noted: 2018-12-28

## 2019-05-08 NOTE — PROGRESS NOTES
HPI 
Zuri Leija is a 25y.o. year old female patient of Ozzie Aldrich MD who presents with c/o right side pain. Pt has history of has Obesity (BMI 35.0-39.9 without comorbidity), Moderate episode of recurrent major depressive disorder ([address] Raynaud's phenomenon, Allergic rhinitis, Panic disorder, Severe obesity with body mass index (BMI) of 35.0 to 39.9 with serious comorbidity ([address] Gender identity disorder, Female-to-male transgender person, and Agoraphobia, unspecified on their problem list.. C/o right shoulder pain that radiates to right hip. Feels like right side is falling asleep since March. Also has pain in her left side- started a week ago. Had similar episode in high school- had xrays, blood work, [address], never figured out what it was, went away on its own. Points to right lower back- normally starts hurting at work when on her feet, radiates down her leg. Then will spread to her right upper arm and right side of neck- will have visible muscle spasms. Last week has spread across her back and to her left side. Describes pain as numbness and stiffness like leg is going to sleep, 4/10. Occurs everyday, always starts in back and then radiates down leg and then up arm to neck. Sx occur more when she's up moving around. Feels like she has spasms in her shoulders. Sometimes has muscle spasms in her sleep. No sx in left arm and leg. Works at [employer]. Took aleve yesterday which helped. Has noticed she is more sensitive to the hot weather- when in the heat gets dizzy, brain feels scrambled, has to sit down, feels better when goes into [facility], [place], mom has the same problem. Denies changes in vision including double vision or eyelid drooping. Last few years left eye has felt different- heavier than right eye or slower to respond. Saw her eye MD in January for new prescription, states exam was normal.  
Denies SOB, THURMAN, wheezing, or cough. Denies chest pain or palpitations.  No facial numbness. No dizziness or headaches. States her mom has hx of myasthenia gravis, dx at age 27. Patient Active Problem List  
Diagnosis Code  Obesity (BMI 35.0-39.9 without comorbidity) E66.9  Moderate episode of recurrent major depressive disorder (HCC) F33.1  Raynaud's phenomenon I73.00  Allergic rhinitis J30.9  Panic disorder F41.0  Severe obesity with body mass index (BMI) of 35.0 to 39.9 with serious comorbidity ([facility]) E66.01  
 Gender identity disorder F64.9 [facility] Female-to-male transgender person F61.0  Agoraphobia, unspecified F40.00 Past Medical History:  
Diagnosis Date  Anxiety  Migraines 2007  
 patient states no longer having migraines as of 8/15/18  Mild depression ([address] 3/10/2015  Obesity 2007  RAD (reactive airway disease) [phone]  
 patient denies having this currently as of 8/15/18  Rhinitis 2005 Allergic & Seasonal  
 
Past Surgical History:  
Procedure Laterality Date  HX BREAST REDUCTION Bilateral 9/6/2018 BILATERAL BREAST REDUCTION WITH FREE NIPPLE GRAFT, LIPOSUCTION TO CHEST WALL (LATEX ALLERGY) performed by Jannette Santos MD at [address] [place] [address] HX OTHER SURGICAL Right 4/2013  
 had piece of bone removed from right arm Social History Socioeconomic History  Marital status: SINGLE Spouse name: Not on file  Number of children: Not on file  Years of education: Not on file  Highest education level: Not on file Tobacco Use  Smoking status: Never Smoker  Smokeless tobacco: Never Used Substance and Sexual Activity  Alcohol use: No  
 Drug use: No  
 Sexual activity: Never Family History Problem Relation Age of Onset [facility] Other Mother MYASTHENIA GRAVIS  
 Hypertension Mother  Heart Disease Maternal Grandmother  Cancer Maternal Grandmother   
     breast cancer  Cancer Maternal Aunt   
     breast cancer  Diabetes Maternal Aunt  Diabetes Maternal Uncle Allergies Allergen Reactions  Latex Hives MEDICATIONS Current Outpatient Medications Medication Sig  
 fexofenadine (ALLEGRA) 180 mg tablet Take 1 Tab by mouth daily.  ALPRAZolam (XANAX) 0.5 mg tablet Take 1 Tab by mouth two (2) times daily as needed for Anxiety. Max Daily Amount: 1 mg. Indications: ANXIETY WITH DEPRESSION  
 Desvenlafaxine 100 mg Tb24 Take 100 mg by mouth daily.  testosterone cypionate (DEPOTESTOTERONE CYPIONATE) 200 mg/mL injection INJECT 0.5 ML INTO THE MUSCLE Q WEEK  Cholecalciferol, Vitamin D3, (VITAMIN D3) 1,000 unit Cap Take 1,000 Units by mouth daily. No current facility-administered medications for this visit. REVIEW OF SYSTEMS Per HPI Visit Vitals /85 (BP 1 Location: Left arm, BP Patient Position: Sitting) Pulse 85 Temp 98.5 °F (36.9 °C) (Oral) Resp 18 Ht 5' 9\" (1.753 m) Wt 272 lb 3.2 oz (123.5 kg) SpO2 99% BMI 40.20 kg/m² General: Well-developed, well-nourished. In no distress. A&O x 3. Head: Normocephalic, atraumatic. Eyes: Conjunctiva clear. Pupils equal, round, reactive to light. Extraocular movements intact. Neck: Supple, symmetrical, trachea midline, no lymphadenopathy, no carotid bruits, no JVD, thyroid: not enlarged, symmetric, no tenderness/mass/nodules. Lungs: Clear to auscultation bilaterally. No crackles or wheezes. No use of accessory muscles. Speaks in full sentences without SOB. Chest Wall: No tenderness or deformity. Heart: RRR, normal S1 and S2, no murmur, click, rub, or gallop. Back: Symmetric. ROM intact. No CVA tenderness. No spine tenderness. Skin: No rashes or lesions. Neurological: CN II-XII intact. Normal strength, sensation throughout.  strength 5/5 bilat. UE and LE strength 5/5 bilat. Neurovasc: No edema appreciated. Dorsalis pedis pulses are 2+ on the right side, and 2+ on the left side.   
     Posterior tibial pulses are 2+ on the right side, and 2+ on the left side.  
Musculoskeletal: Gait normal.  
Psychiatric: Normal mood and affect. Behavior is normal.  
 
 
No results found for any visits on 05/09/19. ASSESSMENT and PLAN Diagnoses and all orders for this visit: 
 
Discussed plan with Dr. Raleigh Mckenzie who agrees with below. 1. Numbness and tingling of right arm and leg -     ACETYLCHOLINE RECEPTOR BINDING ABS W/REFLEX TO MUSK ABS 
-     TSH RFX ON ABNORMAL TO FREE T4 
-     CBC WITH AUTOMATED DIFF 
-     METABOLIC PANEL, COMPREHENSIVE 
-     VITAMIN B12 Unclear etiology, start with labs and imaging, may need referral to Neuro for further workup pending results. 2. Acute right-sided low back pain with right-sided sciatica -     XR SPINE LUMB 2 OR 3 V; Future 
-     naproxen (NAPROSYN) 500 mg tablet; Take 1 Tab by mouth two (2) times daily (with meals). Back pain presents as sciatica, thought not typical for sx to radiate to upper body- start with xrays 3. Muscle spasms of neck -     XR SPINE CERV 4 OR 5 V; Future 
-     naproxen (NAPROSYN) 500 mg tablet; Take 1 Tab by mouth two (2) times daily (with meals). 4. Family history of myasthenia gravis -     ACETYLCHOLINE RECEPTOR BINDING ABS W/REFLEX TO MUSK ABS 5. Heat intolerance 
-     TSH RFX ON ABNORMAL TO FREE T4 
-Likely related to obesity r/o other causes with labs 6. Severe Obesity Possible contributing factor to above sx. Patient Instructions Numbness and Tingling: Care Instructions Your Care Instructions Many things can cause numbness or tingling. Swelling may put pressure on a nerve. This could cause you to lose feeling or have a pins-and-needles sensation on part of your body. Nerves may be damaged from trauma, toxins, or diseases, such as diabetes or multiple sclerosis (MS). Sometimes, though, the cause is not clear.  
If there is no clear reason for your symptoms, and you are not having any other symptoms, your doctor may suggest watching and waiting for a while to see if the numbness or tingling goes away on its own. Your doctor may want you to have blood or nerve tests to find the cause of your symptoms. Follow-up care is a key part of your treatment and safety. Be sure to make and go to all appointments, and call your doctor if you are having problems. It's also a good idea to know your test results and keep a list of the medicines you take. How can you care for yourself at home? · If your doctor prescribes medicine, take it exactly as directed. Call your doctor if you think you are having a problem with your medicine. · If you have any swelling, put ice or a cold pack on the area for 10 to 20 minutes at a time. Put a thin cloth between the ice and your skin. When should you call for help? Call 911 anytime you think you may need emergency care. For example, call if: 
  · You have weakness, numbness, or tingling in both legs.  
  · You lose bowel or bladder control.  
  · You have symptoms of a stroke. These may include: 
? Sudden numbness, tingling, weakness, or loss of movement in your face, arm, or leg, especially on only one side of your body. ? Sudden vision changes. ? Sudden trouble speaking. ? Sudden confusion or trouble understanding simple statements. ? Sudden problems with walking or balance. ? A sudden, severe headache that is different from past headaches.  
 Watch closely for changes in your health, and be sure to contact your doctor if you have any problems, or if: 
  · You do not get better as expected. Where can you learn more? Go to http://william-kory.info/. Enter X621 in the search box to learn more about \"Numbness and Tingling: Care Instructions. \" Current as of: Aidee 3, 2018 Content Version: 11.9 © 2905-9501 [employer], Software 2000. Care instructions adapted under license by [employer] (which disclaims liability or warranty for this information).  If you have questions about a medical condition or this instruction, always ask your healthcare professional. [address] any warranty or liability for your use of this information. Neck Spasm: Care Instructions Your Care Instructions A neck spasm is sudden tightness and pain in your neck muscles. A spasm may be caused by some activities or repeated movements. For example, you may be more likely to have a neck spasm if you slouch, paint a ceiling, work at a computer, or sleep with your neck twisted. But the cause isn't always clear. Home treatment includes using heat or ice, taking over-the-counter (OTC) pain medicines, and avoiding activities that may lead to neck pain. Gentle stretching, or treatments such as massage or manipulation, may also help ease a neck spasm. For a neck spasm that doesn't get better with home care, your doctor may prescribe medicine. He or she may also suggest exercise or physical therapy to help strengthen or relax your neck muscles. Follow-up care is a key part of your treatment and safety. Be sure to make and go to all appointments, and call your doctor if you are having problems. It's also a good idea to know your test results and keep a list of the medicines you take. How can you care for yourself at home? · To relieve pain, use heat or ice (whichever feels better) on the affected area. ? Put a warm water bottle, a heating pad set on low, or a warm cloth on your neck. Put a thin cloth between the heating pad and your skin. Do not go to sleep with a heating pad on your skin. ? Try ice or a cold pack on the area for 10 to 20 minutes at a time. Put a thin cloth between the ice and your skin. · Ask your doctor if you can take acetaminophen (such as Tylenol) or nonsteroidal anti-inflammatory drugs, such as ibuprofen or naproxen. Your doctor can prescribe stronger medicines if needed. Be safe with medicines. Read and follow all instructions on the label. · Stretch your muscles every day, especially before and after exercise and at bedtime. Regular stretching can help relax your muscles. · Try to find a pillow and a position in bed that help improve your night's rest. 
· Try to stay active. It's best to start activity slowly. If an exercise makes your pain worse, stop doing it. When should you call for help? Call 911 anytime you think you may need emergency care. For example, call if: 
  · You are unable to move an arm or a leg at all.  
[facility] your doctor now or seek immediate medical care if: 
  · You have new or worse symptoms in your arms, legs, belly, or buttocks. Symptoms may include: 
? Numbness or tingling. ? Weakness. ? Pain.  
  · You lose bladder or bowel control.  
 Watch closely for changes in your health, and be sure to contact your doctor if: 
  · You do not get better as expected. Where can you learn more? Go to http://williamMaps InDeedkory.info/. Enter K448 in the search box to learn more about \"Neck Spasm: Care Instructions. \" Current as of: September 20, 2018 Content Version: 11.9 © 7795-7433 [employer]. Care instructions adapted under license by [employer] (which disclaims liability or warranty for this information). If you have questions about a medical condition or this instruction, always ask your healthcare professional. [address] any warranty or liability for your use of this information. Please keep your follow-up appointment with Mykel Butler MD. There are no preventive care reminders to display for this patient. I have discussed the diagnosis with the patient and the intended plan as seen in the above orders. Patient is in agreement. The patient has received an after-visit summary and questions were answered concerning future plans.   I have discussed medication side effects and warnings with the patient as well. Warning signs for the above conditions were discussed including when to call our office or go to the emergency room. The nurse provided the patient and/or family with advanced directive information if needed and encouraged the patient to provide a copy to the office when available.

## 2019-05-09 ENCOUNTER — OFFICE VISIT (OUTPATIENT)
Dept: INTERNAL MEDICINE CLINIC | Facility: CLINIC | Age: 24
End: 2019-05-09

## 2019-05-09 VITALS
HEIGHT: 69 IN | HEART RATE: 85 BPM | OXYGEN SATURATION: 99 % | TEMPERATURE: 98.5 F | BODY MASS INDEX: 40.32 KG/M2 | DIASTOLIC BLOOD PRESSURE: 85 MMHG | SYSTOLIC BLOOD PRESSURE: 126 MMHG | WEIGHT: 272.2 LBS | RESPIRATION RATE: 18 BRPM

## 2019-05-09 DIAGNOSIS — Z82.0 FAMILY HISTORY OF MYASTHENIA GRAVIS: ICD-10-CM

## 2019-05-09 DIAGNOSIS — R68.89 HEAT INTOLERANCE: ICD-10-CM

## 2019-05-09 DIAGNOSIS — R20.0 NUMBNESS AND TINGLING OF RIGHT ARM AND LEG: Primary | ICD-10-CM

## 2019-05-09 DIAGNOSIS — M62.838 MUSCLE SPASMS OF NECK: ICD-10-CM

## 2019-05-09 DIAGNOSIS — R20.2 NUMBNESS AND TINGLING OF RIGHT ARM AND LEG: Primary | ICD-10-CM

## 2019-05-09 DIAGNOSIS — E66.01 SEVERE OBESITY (HCC): ICD-10-CM

## 2019-05-09 DIAGNOSIS — M54.41 ACUTE RIGHT-SIDED LOW BACK PAIN WITH RIGHT-SIDED SCIATICA: ICD-10-CM

## 2019-05-09 RX ORDER — DOXYCYCLINE 100 MG/1
TABLET ORAL
Refills: 2 | COMMUNITY
Start: 2019-04-12 | End: 2020-01-21 | Stop reason: ALTCHOICE

## 2019-05-09 RX ORDER — NAPROXEN 500 MG/1
500 TABLET ORAL 2 TIMES DAILY WITH MEALS
Qty: 60 TAB | Refills: 0 | Status: SHIPPED | OUTPATIENT
Start: 2019-05-09 | End: 2020-01-21 | Stop reason: ALTCHOICE

## 2019-05-09 RX ORDER — TRETINOIN 0.25 MG/G
CREAM TOPICAL
COMMUNITY
Start: 2019-03-15 | End: 2020-01-21 | Stop reason: ALTCHOICE

## 2019-05-09 NOTE — PROGRESS NOTES
Krish Subramanian  Identified pt with two pt identifiers(name and ). Chief Complaint Patient presents with  Shoulder Pain  
  right side, feels like it is falling sleep, started march, spreading to left side a week ago, sharp pains  Hip Pain Reviewed record In preparation for visit and have obtained necessary documentation. Has info on advanced directive but has not filled them out. 1. Have you been to the ER, urgent care clinic or hospitalized since your last visit? No  
 
2. Have you seen or consulted any other health care providers outside of the [address] [place] [address] since your last visit? Include any pap smears or colon screening. No 
 
Vitals reviewed with provider. Health Maintenance reviewed: There are no preventive care reminders to display for this patient. Wt Readings from Last 3 Encounters:  
19 272 lb 3.2 oz (123.5 kg)  
18 263 lb (119.3 kg) 18 253 lb 4.9 oz (114.9 kg) Temp Readings from Last 3 Encounters:  
19 98.5 °F (36.9 °C) (Oral)  
18 99 °F (37.2 °C)  
18 98 °F (36.7 °C) BP Readings from Last 3 Encounters:  
19 126/85  
18 120/69  
18 115/69 Pulse Readings from Last 3 Encounters:  
19 85  
18 86  
18 100 Vitals:  
 19 1010 BP: 126/85 Pulse: 85 Resp: 18 Temp: 98.5 °F (36.9 °C) TempSrc: Oral  
SpO2: 99% Weight: 272 lb 3.2 oz (123.5 kg) Height: 5' 9\" (1.753 m) PainSc:   4 PainLoc: Back Learning Assessment:  
:  
 
 
Learning Assessment 3/10/2015 PRIMARY LEARNER Patient HIGHEST LEVEL OF EDUCATION - PRIMARY LEARNER  2 YEARS OF COLLEGE  
BARRIERS PRIMARY LEARNER NONE  
CO-LEARNER CAREGIVER No  
PRIMARY LANGUAGE ENGLISH  
LEARNER PREFERENCE PRIMARY LISTENING  
ANSWERED BY patient RELATIONSHIP SELF Depression Screening:  
:  
 
 
3 most recent PHQ Screens 2019 PHQ Not Done Active Diagnosis of Depression or Bipolar Disorder Little interest or pleasure in doing things - Feeling down, depressed, irritable, or hopeless - Total Score PHQ 2 - Trouble falling or staying asleep, or sleeping too much - Feeling tired or having little energy - Poor appetite, weight loss, or overeating - Feeling bad about yourself - or that you are a failure or have let yourself or your family down - Trouble concentrating on things such as school, work, reading, or watching TV - Moving or speaking so slowly that other people could have noticed; or the opposite being so fidgety that others notice - Thoughts of being better off dead, or hurting yourself in some way -  
PHQ 9 Score - How difficult have these problems made it for you to do your work, take care of your home and get along with others - Fall Risk Assessment:  
:  
 
No flowsheet data found. Abuse Screening:  
:  
 
No flowsheet data found. ADL Screening:  
:  
 
 

## 2019-05-09 NOTE — PATIENT INSTRUCTIONS
Numbness and Tingling: Care Instructions Your Care Instructions Many things can cause numbness or tingling. Swelling may put pressure on a nerve. This could cause you to lose feeling or have a pins-and-needles sensation on part of your body. Nerves may be damaged from trauma, toxins, or diseases, such as diabetes or multiple sclerosis (MS). Sometimes, though, the cause is not clear. If there is no clear reason for your symptoms, and you are not having any other symptoms, your doctor may suggest watching and waiting for a while to see if the numbness or tingling goes away on its own. Your doctor may want you to have blood or nerve tests to find the cause of your symptoms. Follow-up care is a key part of your treatment and safety. Be sure to make and go to all appointments, and call your doctor if you are having problems. It's also a good idea to know your test results and keep a list of the medicines you take. How can you care for yourself at home? · If your doctor prescribes medicine, take it exactly as directed. Call your doctor if you think you are having a problem with your medicine. · If you have any swelling, put ice or a cold pack on the area for 10 to 20 minutes at a time. Put a thin cloth between the ice and your skin. When should you call for help? Call 911 anytime you think you may need emergency care. For example, call if: 
  · You have weakness, numbness, or tingling in both legs.  
  · You lose bowel or bladder control.  
  · You have symptoms of a stroke. These may include: 
? Sudden numbness, tingling, weakness, or loss of movement in your face, arm, or leg, especially on only one side of your body. ? Sudden vision changes. ? Sudden trouble speaking. ? Sudden confusion or trouble understanding simple statements. ? Sudden problems with walking or balance. ? A sudden, severe headache that is different from past headaches.  Watch closely for changes in your health, and be sure to contact your doctor if you have any problems, or if: 
  · You do not get better as expected. Where can you learn more? Go to http://william-kory.info/. Enter J505 in the search box to learn more about \"Numbness and Tingling: Care Instructions. \" Current as of: Aidee 3, 2018 Content Version: 11.9 © 2578-2760 [employer]. Care instructions adapted under license by [employer] (which disclaims liability or warranty for this information). If you have questions about a medical condition or this instruction, always ask your healthcare professional. [place] [address] any warranty or liability for your use of this information. Neck Spasm: Care Instructions Your Care Instructions A neck spasm is sudden tightness and pain in your neck muscles. A spasm may be caused by some activities or repeated movements. For example, you may be more likely to have a neck spasm if you slouch, paint a ceiling, work at a computer, or sleep with your neck twisted. But the cause isn't always clear. Home treatment includes using heat or ice, taking over-the-counter (OTC) pain medicines, and avoiding activities that may lead to neck pain. Gentle stretching, or treatments such as massage or manipulation, may also help ease a neck spasm. For a neck spasm that doesn't get better with home care, your doctor may prescribe medicine. He or she may also suggest exercise or physical therapy to help strengthen or relax your neck muscles. Follow-up care is a key part of your treatment and safety. Be sure to make and go to all appointments, and call your doctor if you are having problems. It's also a good idea to know your test results and keep a list of the medicines you take. How can you care for yourself at home? · To relieve pain, use heat or ice (whichever feels better) on the affected area. ? Put a warm water bottle, a heating pad set on low, or a warm cloth on your neck. Put a thin cloth between the heating pad and your skin. Do not go to sleep with a heating pad on your skin. ? Try ice or a cold pack on the area for 10 to 20 minutes at a time. Put a thin cloth between the ice and your skin. · Ask your doctor if you can take acetaminophen (such as Tylenol) or nonsteroidal anti-inflammatory drugs, such as ibuprofen or naproxen. Your doctor can prescribe stronger medicines if needed. Be safe with medicines. Read and follow all instructions on the label. · Stretch your muscles every day, especially before and after exercise and at bedtime. Regular stretching can help relax your muscles. · Try to find a pillow and a position in bed that help improve your night's rest. 
· Try to stay active. It's best to start activity slowly. If an exercise makes your pain worse, stop doing it. When should you call for help? Call 911 anytime you think you may need emergency care. For example, call if: 
  · You are unable to move an arm or a leg at all.  
[facility] your doctor now or seek immediate medical care if: 
  · You have new or worse symptoms in your arms, legs, belly, or buttocks. Symptoms may include: 
? Numbness or tingling. ? Weakness. ? Pain.  
  · You lose bladder or bowel control.  
 Watch closely for changes in your health, and be sure to contact your doctor if: 
  · You do not get better as expected. Where can you learn more? Go to http://william-kory.info/. Enter F495 in the search box to learn more about \"Neck Spasm: Care Instructions. \" Current as of: September 20, 2018 Content Version: 11.9 © 0106-8052 [employer]. Care instructions adapted under license by [employer] (which disclaims liability or warranty for this information).  If you have questions about a medical condition or this instruction, always ask your healthcare professional. [employer] disclaims any warranty or liability for your use of this information.

## 2019-05-11 NOTE — PROGRESS NOTES
So far all labs are normal- thyroid, blood counts,blood sugar, kidney and liver, electrolytes, and Vitamin B12. Once we get the rest of the results I will notify you.

## 2019-05-30 LAB
ACHR BIND AB SER-SCNC: 0.05 NMOL/L (ref 0–0.24)
ALBUMIN SERPL-MCNC: 4.7 G/DL (ref 3.5–5.5)
ALBUMIN/GLOB SERPL: 1.6 {RATIO} (ref 1.2–2.2)
ALP SERPL-CCNC: 108 IU/L (ref 39–117)
ALT SERPL-CCNC: 30 IU/L (ref 0–32)
AST SERPL-CCNC: 24 IU/L (ref 0–40)
BASOPHILS # BLD AUTO: 0 X10E3/UL (ref 0–0.2)
BASOPHILS NFR BLD AUTO: 0 %
BILIRUB SERPL-MCNC: 0.3 MG/DL (ref 0–1.2)
BUN SERPL-MCNC: 11 MG/DL (ref 6–20)
BUN/CREAT SERPL: 11 (ref 9–23)
CALCIUM SERPL-MCNC: 10.1 MG/DL (ref 8.7–10.2)
CHLORIDE SERPL-SCNC: 98 MMOL/L (ref 96–106)
CO2 SERPL-SCNC: 27 MMOL/L (ref 20–29)
CREAT SERPL-MCNC: 1.01 MG/DL (ref 0.57–1)
EOSINOPHIL # BLD AUTO: 0.1 X10E3/UL (ref 0–0.4)
EOSINOPHIL NFR BLD AUTO: 2 %
ERYTHROCYTE [DISTWIDTH] IN BLOOD BY AUTOMATED COUNT: 13.5 % (ref 12.3–15.4)
GLOBULIN SER CALC-MCNC: 3 G/DL (ref 1.5–4.5)
GLUCOSE SERPL-MCNC: 85 MG/DL (ref 65–99)
HCT VFR BLD AUTO: 45.3 % (ref 34–46.6)
HGB BLD-MCNC: 15.9 G/DL (ref 11.1–15.9)
IMM GRANULOCYTES # BLD AUTO: 0 X10E3/UL (ref 0–0.1)
IMM GRANULOCYTES NFR BLD AUTO: 0 %
LYMPHOCYTES # BLD AUTO: 2.5 X10E3/UL (ref 0.7–3.1)
LYMPHOCYTES NFR BLD AUTO: 34 %
MCH RBC QN AUTO: 31.6 PG (ref 26.6–33)
MCHC RBC AUTO-ENTMCNC: 35.1 G/DL (ref 31.5–35.7)
MCV RBC AUTO: 90 FL (ref 79–97)
MONOCYTES # BLD AUTO: 0.3 X10E3/UL (ref 0.1–0.9)
MONOCYTES NFR BLD AUTO: 4 %
MUSK AB SER-SCNC: <1 U/ML
NEUTROPHILS # BLD AUTO: 4.3 X10E3/UL (ref 1.4–7)
NEUTROPHILS NFR BLD AUTO: 60 %
PLATELET # BLD AUTO: 338 X10E3/UL (ref 150–379)
POTASSIUM SERPL-SCNC: 4.8 MMOL/L (ref 3.5–5.2)
PROT SERPL-MCNC: 7.7 G/DL (ref 6–8.5)
RBC # BLD AUTO: 5.03 X10E6/UL (ref 3.77–5.28)
SODIUM SERPL-SCNC: 141 MMOL/L (ref 134–144)
TSH SERPL DL<=0.005 MIU/L-ACNC: 2.95 UIU/ML (ref 0.45–4.5)
VIT B12 SERPL-MCNC: 360 PG/ML (ref 232–1245)
WBC # BLD AUTO: 7.2 X10E3/UL (ref 3.4–10.8)

## 2019-05-31 ENCOUNTER — OFFICE VISIT (OUTPATIENT)
Dept: INTERNAL MEDICINE CLINIC | Facility: CLINIC | Age: 24
End: 2019-05-31

## 2019-05-31 VITALS
BODY MASS INDEX: 39.99 KG/M2 | RESPIRATION RATE: 16 BRPM | HEART RATE: 83 BPM | TEMPERATURE: 98.3 F | SYSTOLIC BLOOD PRESSURE: 128 MMHG | DIASTOLIC BLOOD PRESSURE: 82 MMHG | HEIGHT: 69 IN | OXYGEN SATURATION: 98 % | WEIGHT: 270 LBS

## 2019-05-31 DIAGNOSIS — R20.0 NUMBNESS AND TINGLING OF RIGHT ARM AND LEG: Primary | ICD-10-CM

## 2019-05-31 DIAGNOSIS — G89.29 CHRONIC RIGHT-SIDED LOW BACK PAIN WITHOUT SCIATICA: ICD-10-CM

## 2019-05-31 DIAGNOSIS — F33.1 MODERATE EPISODE OF RECURRENT MAJOR DEPRESSIVE DISORDER (HCC): ICD-10-CM

## 2019-05-31 DIAGNOSIS — M62.838 MUSCLE SPASMS OF NECK: ICD-10-CM

## 2019-05-31 DIAGNOSIS — R20.2 NUMBNESS AND TINGLING OF RIGHT ARM AND LEG: Primary | ICD-10-CM

## 2019-05-31 DIAGNOSIS — M54.50 CHRONIC RIGHT-SIDED LOW BACK PAIN WITHOUT SCIATICA: ICD-10-CM

## 2019-05-31 NOTE — PROGRESS NOTES
CC:   Chief Complaint   Patient presents with    Other     follow up from numbness on right side/has traveled to left side       [address] Noe is a 25 y.o. adult. Presents for evaluation of numbness. He is a female-to-male transgender and has major depression, panic disorder, allergic rhinitis, and obesity. Saw Quinton Beltran NP at this clinic on 5/9/19 with complaint of right arm and leg numbness and tingling and acute right-sided low back pain with right-sided sciatica. X-rays of lumbar spine and cervical spine both normal.  Labs, including test for myasthenia gravis (a disorder that his mother has) were all normal.  Was prescribed Naprosyn 500 mg daily but never filled the prescription. Takes OTC Aleve as needed. Today she reports that her symptoms started in Feb.  Starts with right-sided lower back pain, then experiences numbness that radiates down the right leg, numbness that moves up the right arm, and muscle spasms at the right lower neck/supraclavicular area visible to others. Intermittent but occurs daily. Alleviating factors: changing body position by standing or sitting for a few minutes, Aleve. Exacerbating factors: prolonged sitting or standing; numbness worsened when she sat in a movie theater and watched the 3 hour movie \"Recondo\". Denies tingling, only numbness. Has recently started having mild left-sided back pain and leg numbness. Denies any preceding injury or trauma or new medications. Has been taking desvenlafaxine for a year and receiving testosterone injections for 2 years. Takes Xanax infrequently, about once every 2-4 weeks. Other Providers: Wendy Rueda ([facility]), Dr. Zahida Land (Psychiatry)      Soc Hx  Single. Lives with his mother. No children. Works at the [employer]. Never smoker. Denies alcohol or recreational drug use.  Is not sexually active.      ROS  A complete review of systems was performed and is negative except for those mentioned in the HPI. Patient Active Problem List   Diagnosis Code    Obesity (BMI 35.0-39.9 without comorbidity) E66.9    Moderate episode of recurrent major depressive disorder ([facility] F33.1    Raynaud's phenomenon I73.00    Allergic rhinitis J30.9    Panic disorder F41.0    Severe obesity with body mass index (BMI) of 35.0 to 39.9 with serious comorbidity ([facility] [address] E66.01    Gender identity disorder F62.7    Female-to-male transgender person F61.0    Agoraphobia, unspecified F40.00     Past Medical History:   Diagnosis Date    Anxiety     Migraines 2007    patient states no longer having migraines as of 8/15/18    Mild depression ([facility] [address] [facility] 3/10/2015    Obesity 2007    RAD (reactive airway disease) 9970,3514    patient denies having this currently as of 8/15/18    Rhinitis 2005    Allergic & Seasonal     Allergies   Allergen Reactions    Latex Hives       Current Outpatient Medications   Medication Sig Dispense Refill    doxycycline (ADOXA) 100 mg tablet   2    tretinoin (RETIN-A) 0.025 % topical cream       naproxen (NAPROSYN) 500 mg tablet Take 1 Tab by mouth two (2) times daily (with meals). 60 Tab 0    fexofenadine (ALLEGRA) 180 mg tablet Take 1 Tab by mouth daily.  ALPRAZolam (XANAX) 0.5 mg tablet Take 1 Tab by mouth two (2) times daily as needed for Anxiety. Max Daily Amount: 1 mg. Indications: ANXIETY WITH DEPRESSION 30 Tab 0    Desvenlafaxine 100 mg Tb24 Take 100 mg by mouth daily.  testosterone cypionate (DEPOTESTOTERONE CYPIONATE) 200 mg/mL injection INJECT 0.5 ML INTO THE MUSCLE Q WEEK  0    Cholecalciferol, Vitamin D3, (VITAMIN D3) 1,000 unit Cap Take 1,000 Units by mouth daily.            PHYSICAL EXAM  Visit Vitals  /82 (BP 1 Location: Right arm, BP Patient Position: Sitting)   Pulse 83   Temp 98.3 °F (36.8 °C) (Oral)   Resp 16   Ht 5' 9\" (1.753 m)   Wt 270 lb (122.5 kg)   SpO2 98%   BMI 39.87 kg/m²   7 lb weight gain since last clinic visit 6 months ago    General: Obese, no distress. Has a beard. HEENT:  Head normocephalic/atraumatic, no scleral icterus  Lungs:  Clear to ausculation bilaterally. Good air movement. Heart:  Regular rate and rhythm, normal S1 and S2, no murmur, gallop, or rub  Back: Normal ROM. No paravertebral, vertebral, or CVA tenderness. Extremities: No clubbing, cyanosis, or edema. Neurological: Alert and oriented x 3. CN's 2-12 intact. Normal MS and reflexes throughout. Psychiatric: Normal mood and affect. Behavior is normal.     Results for orders placed or performed in visit on 05/09/19   ACETYLCHOLINE RECEPTOR BINDING ABS W/REFLEX TO MUSK ABS   Result Value Ref Range    AChR Binding Ab, serum 0.05 0.00 - 0.24 nmol/L   TSH RFX ON ABNORMAL TO FREE T4   Result Value Ref Range    TSH 2.950 0.450 - 4.500 uIU/mL   CBC WITH AUTOMATED DIFF   Result Value Ref Range    WBC 7.2 3.4 - 10.8 x10E3/uL    RBC 5.03 3.77 - 5.28 x10E6/uL    HGB 15.9 11.1 - 15.9 g/dL    HCT 45.3 34.0 - 46.6 %    MCV 90 79 - 97 fL    MCH 31.6 26.6 - 33.0 pg    MCHC 35.1 31.5 - 35.7 g/dL    RDW 13.5 12.3 - 15.4 %    PLATELET 145 623 - 930 x10E3/uL    NEUTROPHILS 60 Not Estab. %    Lymphocytes 34 Not Estab. %    MONOCYTES 4 Not Estab. %    EOSINOPHILS 2 Not Estab. %    BASOPHILS 0 Not Estab. %    ABS. NEUTROPHILS 4.3 1.4 - 7.0 x10E3/uL    Abs Lymphocytes 2.5 0.7 - 3.1 x10E3/uL    ABS. MONOCYTES 0.3 0.1 - 0.9 x10E3/uL    ABS. EOSINOPHILS 0.1 0.0 - 0.4 x10E3/uL    ABS. BASOPHILS 0.0 0.0 - 0.2 x10E3/uL    IMMATURE GRANULOCYTES 0 Not Estab. %    ABS. IMM.  GRANS. 0.0 0.0 - 0.1 H46E2/IK   METABOLIC PANEL, COMPREHENSIVE   Result Value Ref Range    Glucose 85 65 - 99 mg/dL    BUN 11 6 - 20 mg/dL    Creatinine 1.01 (H) 0.57 - 1.00 mg/dL    GFR est non-AA 78 >59 mL/min/1.73    GFR est AA 90 >59 mL/min/1.73    BUN/Creatinine ratio 11 9 - 23    Sodium 141 134 - 144 mmol/L    Potassium 4.8 3.5 - 5.2 mmol/L    Chloride 98 96 - 106 mmol/L    CO2 27 20 - 29 mmol/L    Calcium 10.1 8.7 - 10.2 mg/dL    Protein, total 7.7 6.0 - 8.5 g/dL    Albumin 4.7 3.5 - 5.5 g/dL    GLOBULIN, TOTAL 3.0 1.5 - 4.5 g/dL    A-G Ratio 1.6 1.2 - 2.2    Bilirubin, total 0.3 0.0 - 1.2 mg/dL    Alk. phosphatase 108 39 - 117 IU/L    AST (SGOT) 24 0 - 40 IU/L    ALT (SGPT) 30 0 - 32 IU/L   VITAMIN B12   Result Value Ref Range    Vitamin B12 360 232 - 1,245 pg/mL   MUSK ANTIBODIES   Result Value Ref Range    MUSK ANTIBODY <1.0 U/mL         ASSESSMENT AND PLAN    ICD-10-CM ICD-9-CM    1. Numbness and tingling of right arm and leg R20.0 782.0 REFERRAL TO NEUROLOGY    R20.2     2. Chronic right-sided low back pain without sciatica M54.5 724.2     G89.29 338.29    3. Muscle spasms of neck M62.838 728.85    4. Moderate episode of recurrent major depressive disorder (HCC) F33.1 296.32      Diagnoses and all orders for this visit:    1. Numbness and tingling of right arm and leg  Has numbness with no tingling at right arm and leg now spreading to left leg. Lumbar spine and cervical X-rays were normal.  Symptoms are not in a typical neurologic pattern. Doubt medication-related (desvenlafaxine and testosterone can cause paresthesias but has been on both for over a year). Will refer to Neurology to rule out peripheral neuropathy or motor neuron disorder.  -     REFERRAL TO NEUROLOGY    2. Chronic right-sided low back pain without sciatica  Continue Aleve as needed. 3. Muscle spasms of neck  Might have muscle fasciculations. 4. Moderate episode of recurrent major depressive disorder (HCC)  Controlled on desvenlafaxine and individual therapy. Follow-up and Dispositions    · Return in about 3 months (around 8/31/2019), or if symptoms worsen or fail to improve, for F/U numbness, weight. I have discussed the diagnosis with the patient and the intended plan as seen in the above orders. Patient is in agreement.   The patient has received an after-visit summary and questions were answered concerning future plans.  I have discussed medication side effects and warnings with the patient as well.

## 2019-05-31 NOTE — PROGRESS NOTES
Kristen Lewis  Identified pt with two pt identifiers(name and ). Chief Complaint   Patient presents with    Other     follow up from numbness on right side/has traveled to left side       1. Have you been to the ER, urgent care clinic since your last visit? Hospitalized since your last visit? NO    2. Have you seen or consulted any other health care providers outside of the [address] [place] [address] since your last visit? Include any pap smears or colon screening. NO    Today's provider has been notified of reason for visit, vitals and flowsheets obtained on patients. Patient received paperwork for advance directive during previous visit but has not completed at this time     Reviewed record In preparation for visit, huddled with provider and have obtained necessary documentation      There are no preventive care reminders to display for this patient.     Wt Readings from Last 3 Encounters:   19 270 lb (122.5 kg)   19 272 lb 3.2 oz (123.5 kg)   18 263 lb (119.3 kg)     Temp Readings from Last 3 Encounters:   19 98.3 °F (36.8 °C) (Oral)   19 98.5 °F (36.9 °C) (Oral)   18 99 °F (37.2 °C)     BP Readings from Last 3 Encounters:   19 128/82   19 126/85   18 120/69     Pulse Readings from Last 3 Encounters:   19 83   19 85   18 86     Vitals:    19 0913   BP: 128/82   Pulse: 83   Resp: 16   Temp: 98.3 °F (36.8 °C)   TempSrc: Oral   SpO2: 98%   Weight: 270 lb (122.5 kg)   Height: 5' 9\" (1.753 m)   PainSc:   0 - No pain         Learning Assessment:  :     Learning Assessment 3/10/2015   PRIMARY LEARNER Patient   HIGHEST LEVEL OF EDUCATION - PRIMARY LEARNER  2 YEARS OF COLLEGE   BARRIERS PRIMARY LEARNER NONE   CO-LEARNER CAREGIVER No   PRIMARY LANGUAGE ENGLISH   LEARNER PREFERENCE PRIMARY LISTENING   ANSWERED BY patient   RELATIONSHIP SELF       Depression Screening:  :     3 most recent PHQ Screens 2019   PHQ Not Done Active Diagnosis of Depression or Bipolar Disorder   Little interest or pleasure in doing things -   Feeling down, depressed, irritable, or hopeless -   Total Score PHQ 2 -   Trouble falling or staying asleep, or sleeping too much -   Feeling tired or having little energy -   Poor appetite, weight loss, or overeating -   Feeling bad about yourself - or that you are a failure or have let yourself or your family down -   Trouble concentrating on things such as school, work, reading, or watching TV -   Moving or speaking so slowly that other people could have noticed; or the opposite being so fidgety that others notice -   Thoughts of being better off dead, or hurting yourself in some way -   PHQ 9 Score -   How difficult have these problems made it for you to do your work, take care of your home and get along with others -       Fall Risk Assessment:  :     No flowsheet data found. Abuse Screening:  :     No flowsheet data found. ADL Screening:  :     ADL Assessment 4/30/2018   Feeding yourself No Help Needed   Getting from bed to chair No Help Needed   Getting dressed No Help Needed   Bathing or showering No Help Needed   Walk across the room (includes cane/walker) No Help Needed   Using the telphone No Help Needed   Taking your medications No Help Needed   Preparing meals No Help Needed   Managing money (expenses/bills) No Help Needed   Moderately strenuous housework (laundry) No Help Needed   Shopping for personal items (toiletries/medicines) No Help Needed   Shopping for groceries No Help Needed   Driving No Help Needed   Climbing a flight of stairs No Help Needed   Getting to places beyond walking distances No Help Needed                 Medication reconciliation up to date and corrected with patient at this time.

## 2020-01-21 ENCOUNTER — OFFICE VISIT (OUTPATIENT)
Dept: BEHAVIORAL/MENTAL HEALTH CLINIC | Age: 25
End: 2020-01-21

## 2020-01-21 VITALS
WEIGHT: 270 LBS | BODY MASS INDEX: 39.99 KG/M2 | HEIGHT: 69 IN | DIASTOLIC BLOOD PRESSURE: 76 MMHG | HEART RATE: 77 BPM | SYSTOLIC BLOOD PRESSURE: 113 MMHG

## 2020-01-21 DIAGNOSIS — F43.81 GRIEF REACTION WITH PROLONGED BEREAVEMENT: ICD-10-CM

## 2020-01-21 DIAGNOSIS — Z78.9 FEMALE-TO-MALE TRANSGENDER PERSON: ICD-10-CM

## 2020-01-21 DIAGNOSIS — F33.1 MODERATE EPISODE OF RECURRENT MAJOR DEPRESSIVE DISORDER (HCC): Primary | ICD-10-CM

## 2020-01-21 DIAGNOSIS — F40.00 AGORAPHOBIA, UNSPECIFIED: ICD-10-CM

## 2020-01-21 RX ORDER — DESVENLAFAXINE 25 MG/1
25 TABLET, EXTENDED RELEASE ORAL DAILY
Qty: 30 TAB | Refills: 2 | Status: SHIPPED | OUTPATIENT
Start: 2020-01-21 | End: 2020-03-11 | Stop reason: SDDI

## 2020-01-21 RX ORDER — DESVENLAFAXINE SUCCINATE 50 MG/1
50 TABLET, EXTENDED RELEASE ORAL DAILY
Qty: 30 TAB | Refills: 2 | Status: SHIPPED | OUTPATIENT
Start: 2020-01-21 | End: 2020-03-11

## 2020-01-21 RX ORDER — PROPRANOLOL HYDROCHLORIDE 20 MG/1
20 TABLET ORAL
Qty: 30 TAB | Refills: 1 | Status: SHIPPED | OUTPATIENT
Start: 2020-01-21 | End: 2020-01-21

## 2020-01-21 RX ORDER — TESTOSTERONE 20.25 MG/1.25G
40.5 GEL TOPICAL DAILY
COMMUNITY
Start: 2020-01-19 | End: 2021-07-05 | Stop reason: SDUPTHER

## 2020-01-21 RX ORDER — PROPRANOLOL HYDROCHLORIDE 20 MG/1
20 TABLET ORAL
Qty: 30 TAB | Refills: 1 | Status: SHIPPED | OUTPATIENT
Start: 2020-01-21 | End: 2020-03-11

## 2020-01-21 NOTE — PROGRESS NOTES
INITIAL PSYCHIATRIC EVALUATION    IDENTIFICATION:      A. Name: Ronaldo Pierre Age:     25 y.o.      C.   MRN: [id number]       D.   CSN:      [id number]      E. Admission Date: (Not on file)       JANNA   :     1995          SOURCE OF INFORMATION: The patient, past records          CHIEF COMPLAINT:  Depressed, lack of interest    Current symptoms: racing thoughts, distractible,depresssed, loss of interest, feeling bad about self,low energy,figety,death wish, anxious,tense,fearful,feels he is causing problems for his mother  Duration of symptoms: worse since , but has been fearful and anxious most of her/his life     PHQ-9 scores:  HAM-A scores:  Mood Disorder Questionaire scores:    HPI: Mr. Soto Thompson is a \"He is a female-to-male transgender\" with  major depression, panic disorder, allergic rhinitis, and obesity. He has  underwent bilateral breast reduction surgery on 18. Receives testosterone injections at [facility]; testosterone levels followed there. He reports having been depressed and anxious for years because he did not feel accepted and felt different from the onset. The separation and divorce of his parents had traumatic effect on him , how his father ( working in [employer]) could betray his family and remarry. He was close to his paternal grandparent who . He feels guilty for his mother's ailments and having problems with his situation. He reports having performed well academically but couldn't make friends, remained avoidant,isolative, introvert . He would become anxious in crowds, avoided grocery shopping and or even getting his mail. Began to get help in the senior year of college because he was failing all his classes.     Reports depression and anxiety are generally controlled with Pristiq but WEPT THRU THE ENTIRE SESSION AS HE LAMENTED ABOUT HIS CONDITION/SITUATION,PREDICAMENT.    He began receiving care at the \" [facility]\" working with Jewel Ritter, then with Dr. Rickie Bird (Psychiatry) then at [facility] ( Dr. Robson Durant) in  and then  Rosa Troncoso, PhD who helped with his  OCD like symptoms. He has never been hospitalized nor attempted suicide. He denies any substance use disorder. He denies any a sexual or major traumatic events to cause PTSD.           STRESSORS and or Precipitating factors: Mother disabled since 2014 due to muscular dystrophy  Paternal grandparents  to whom he was very close to ( grandma in 2018)  Surgery for Hysterectomy approaching    PERSONAL COPING STYLEs :  Isolate,withdraw,dissociate, knit, talk self down,           REVIEW OF  PSYCHIATRIC SYSTEMS:  Patient did not meet criteria for a  PTSD, Schizophrenia, Bipolar Affective Disorder, Obsessive Compulsive Disorder, Anxiety Disorder, Agoraphobia, EATING DISORDER,SUBSTANCE USE DISORDER,  Psychotic disorders or ASD. PAST PSYCHIATRIC HISTORY:   Outpatient Psychiatric treatments: \"[facility]\"  With Jewel Ritter, [facility], Dr. Rickie Bird (Psychiatry) is moving to out of [facility]; he is scheduled to see a new psychiatrist at [facility] ( Dr. Robson Durant) in  and a OCD specialist next month. Still following with a therapist. Rosa Troncoso, PhD for OCD ( used to count,writing over and over again)    Suicide attempts/self inflictive behaviors:none  Hospitalizations:  none  Medications Tried:  Zoloft and Wellbutrin  ECT,TMS or Ketamine infusion:   none  Legal/Assault History:  none    PAST SUBSTANCE ABUSE HISTORY:  unremarkable    FAMILY PSYCH HISTORY: maternal aunt with bipolar disorder( ?alcoholic),another is odd,paternal great aunts were bizarre       SOCIAL HISTORY: Only child born to an intact marriage, parents  when he was 10 y/o, dad moved to [place] and remarried. He denies any physical or sexual abuse. He was bullied in middle school. He received his BA in History  From [employer].  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 PAST MEDICAL/SURGICAL HISTORY:  Obesity and Vit D deficiency    Current Outpatient Medications   Medication Sig Dispense Refill    testosterone (ANDROGEL) 20.25 mg/1.25 gram (1.62 %) gel 40.5 mg by TransDERmal route daily.  desvenlafaxine succinate (PRISTIQ) 25 mg ER tablet Take 1 Tab by mouth daily. 30 Tab 2    desvenlafaxine succinate (PRISTIQ) 50 mg ER tablet Take 1 Tab by mouth daily. 30 Tab 2    propranolol (INDERAL) 20 mg tablet Take 1 Tab by mouth two (2) times daily as needed for Other (anxiety). Indications: take 1/2 hour before the event 30 Tab 1    fexofenadine (ALLEGRA) 180 mg tablet Take 1 Tab by mouth daily.  Cholecalciferol, Vitamin D3, (VITAMIN D3) 1,000 unit Cap Take 1,000 Units by mouth daily. Medical review of systems mainly considered within normal limits expect as noted in history above. Pertinent LABS: Labs from May, 2019 showed normal TSH, M87, Metabolic panel and CBCD      ALLERGIES:   He is allergic to latex. MENTAL STATUS EXAM:  Orientation person, place, time/date, situation, day of week, month of year and year    Behavior/Eye contact: Good eye contact   Appearance:  Well kempt,appearing his/her age   Motor Behavior:  within normal limits   Speech:  normal pitch, normal volume and non-pressured   Thought Process: goal directed and logical   Thought Content free of delusions and obsessions   Suicidal ideations no plan  and no intention   Homicidal ideations no plan  and no intention   Mood:  anxious and depressed   Affect:  mood-congruent, WEPT THRU THE WHOLE SESSION   Memory recent  adequate   Memory remote:  adequate   Concentration:  adequate   Abstraction:  abstract   Insight:  good   Reliability good   Perceptual disortions  Absent( auditory,visual,olfactory,tactile), patient denied         ASSESSMENT:  The patient is a 25 y.o.    single adult Transgender male,with symptoms of chronic anxiety and depression related to his gender identity disorder. He has since undergone hormonal and surgical changes but remains depressed, having a sense of guilt,with negative and self degrading thoughts, sense of failure,wanting to please and do more for mom. Suicide/Homicide risk : (Nil,Low, Moderate, High): low to nil    STRENGTHS:   WEAKNESSES:    PROVISIONAL DIAGNOSES:    ICD-10-CM ICD-9-CM   1. Moderate episode of recurrent major depressive disorder (HCC) F33.1 296.32   2. Female-to-male transgender person F64.0 36.80   3. Agoraphobia, unspecified F40.00 300.22   4. Grief reaction with prolonged bereavement F43.21 309.0       Orders Placed This Encounter    desvenlafaxine succinate (PRISTIQ) 25 mg ER tablet     Sig: Take 1 Tab by mouth daily. Dispense:  30 Tab     Refill:  2    desvenlafaxine succinate (PRISTIQ) 50 mg ER tablet     Sig: Take 1 Tab by mouth daily. Dispense:  30 Tab     Refill:  2    DISCONTD: propranolol (INDERAL) 20 mg tablet     Sig: Take 1 Tab by mouth two (2) times daily as needed for Other (anxiety). Dispense:  30 Tab     Refill:  1    propranolol (INDERAL) 20 mg tablet     Sig: Take 1 Tab by mouth two (2) times daily as needed for Other (anxiety). Indications: take 1/2 hour before the event     Dispense:  30 Tab     Refill:  1       TREATMENT PLAN:       1. Medications:      Advised that Pristiq would need to be increased and since low dose Xanax induced sleep, will place on Inderal for anxiety/outings. He agreed reluctantly, giving mixed messages of having profound depression and anxiety yet ok with the current medications which is subtherapeutic. The risks and benefits of the proposed medications; the potential medication side effects and consequences of non-compliance were dicussed.   The  patient was given opportunity to ask questions             2. Counseling/ psychotherapy:  Psych-education provided: on adequate dosing of meds  Discussed rational versus irrational thinking patterns and their consequences. Discussed healthy/adaptive and unhealthy/maladaptive coping. 3.  Labs/ tests/ old records/ collateral: NA    4. Follow up : 6 weeks    Referrals/Consults: has own therapist          Mr. Lupe Garcia has a reminder for a \"due or due soon\" health maintenance. I have asked that he contact his primary care provider for follow-up on this health maintenance. TIME SPENT FACE TO FACE:  61 MINUTES                  SIGNED:    Sultana DILIA Watts MD,   Adult Psychiatrist/Psychosomatic Medicine  1/21/2020         * If you feel suicidal after hours, please call the [address] [place] [address] @ [phone] OR GO TO THE NEAREST [address]. YOU MAY ALSO ACCESS THE SUICIDE HOTLINE @ \"SPEAKING OF SUICIDE. COM/RESOURCES\"

## 2020-01-29 NOTE — PATIENT INSTRUCTIONS
Shoulder Blade: Exercises  Your Care Instructions  Here are some examples of typical exercises for your condition. Start each exercise slowly. Ease off the exercise if you start to have pain. Your doctor or physical therapist will tell you when you can start these exercises and which ones will work best for you. How to do the exercises  Shoulder roll    1. Stand tall with your chin slightly tucked. Imagine that a string at the top of your head is pulling you straight up. 2. Keep your arms relaxed. All motion will be in your shoulders. 3. Shrug your shoulders up toward your ears, then up and back. Art your shoulders down and back, like you're sliding your hands down into your back pants pockets. 4. Repeat the circles at least 2 to 4 times. This exercise is also helpful anytime you want to relax. Lower neck and upper back stretch    1. With your arms about shoulder height, clasp your hands in front of you. 2. Drop your chin toward your chest.  3. Reach straight forward so you are rounding your upper back. Think about pulling your shoulder blades apart. Cora Delgado feel a stretch across your upper back and shoulders. Hold for at least 6 seconds. 4. Repeat 2 to 4 times. Triceps stretch    1. Reach your arm straight up. 2. Keeping your elbow in place, bend your arm and reach your hand down behind your back. 3. With your other hand, apply gentle pressure to the bent elbow. Cora Delgado feel a stretch at the back of your upper arm and shoulder. Hold about 6 seconds. 4. Repeat 2 to 4 times with each arm. Shoulder stretch    1. Relax your shoulders. 2. Raise one arm to shoulder height, and reach it across your chest.  3. Pull the arm slightly toward you with your other arm. This will help you get a gentle stretch. Hold for about 6 seconds. 4. Repeat 2 to 4 times. Shoulder blade squeeze    1. Sit or stand up tall with your arms at your sides. 2. Keep your shoulders relaxed and down, not shrugged.   3. Squeeze your shoulder blades together. Hold for 6 seconds, then relax. 4. Repeat 8 to 12 times. Straight-arm shoulder blade squeeze    1. Sit or stand tall. Relax your shoulders. 2. With palms down, hold your elastic tubing or band straight out in front of you. 3. Start with slight tension in the tubing or band, with your hands about shoulder-width apart. 4. Slowly pull straight out to the sides, squeezing your shoulder blades together. Keep your arms straight and at shoulder height. Slowly release. 5. Repeat 8 to 12 times. Rowing    1. [place] your elastic tubing or band at about waist height. Take one end in each hand. 2. Sit or stand with your feet hip-width apart. 3. Hold your arms straight in front of you. Adjust your distance to create slight tension in the tubing or band. 4. Slightly tuck your chin. Relax your shoulders. 5. Without shrugging your shoulders, pull straight back. Your elbows will pass alongside your waist.  Pull-downs    1. [place] your elastic tubing or band in the top of a closed door. Take one end in each hand. 2. Either sit or stand, depending on what is more comfortable. If you feel unsteady, sit on a chair. 3. Start with your arms up and comfortably apart, elbows straight. There should be a slight tension in the tubing or band. 4. Slightly tuck your chin, and look straight ahead. 5. Keeping your back straight, slowly pull down and back, bending your elbows. 6. Stop where your hands are level with your chin, in a \"goalpost\" position. 7. Repeat 8 to 12 times. Chest T stretch    1. Lie on your back. Raise your knees so they are bent. Plant your feet on the floor, hip-width apart. 2. Tuck your chin, and relax your shoulders. 3. Reach your arms straight out to the sides. If you don't feel a mild stretch in your shoulders and across your chest, use a foam roll or a tightly rolled blanket under your spine, from your tailbone to your head.   4. Relax in this position for at least 15 to 30 seconds while you breathe normally. Repeat 2 to 4 times. As you get used to this stretch, keep adding a little more time until you are able relax in this position for 2 or 3 minutes. When you can relax for at least 2 minutes, you only need to do the exercise 1 time per session. Chest goalpost stretch    1. Lie on your back. Raise your knees so they are bent. Plant your feet on the floor, hip-width apart. 2. Tuck your chin, and relax your shoulders. 3. Reach your arms straight out to the sides. 4. Bend your arms at the elbows, with your hands pointed toward the top of your head. Your arms should make an L on either side of your head. Your palms should be facing up. 5. If you don't feel a mild stretch in your shoulders and across your chest, use a foam roll or tightly rolled blanket under your spine, from your tailbone to your head. 6. Relax in this position for at least 15 to 30 seconds while you breathe normally. Repeat 2 to 4 times. Each day you do this exercise, add a little more time until you can relax in this position for 2 or 3 minutes. When you can relax for at least 2 minutes, you only need to do the exercise 1 time per session. Follow-up care is a key part of your treatment and safety. Be sure to make and go to all appointments, and call your doctor if you are having problems. It's also a good idea to know your test results and keep a list of the medicines you take. Where can you learn more? Go to http://william-kory.info/. Enter [phone] in the search box to learn more about \"Shoulder Blade: Exercises. \"  Current as of: March 21, 2017  Content Version: 11.3  © 6116-1931 [employer]. Care instructions adapted under license by [employer] (which disclaims liability or warranty for this information).  If you have questions about a medical condition or this instruction, always ask your healthcare professional. Yeyo Beltran disclaims any warranty or liability for your use of this information. no

## 2020-03-11 ENCOUNTER — OFFICE VISIT (OUTPATIENT)
Dept: BEHAVIORAL/MENTAL HEALTH CLINIC | Age: 25
End: 2020-03-11

## 2020-03-11 VITALS
HEART RATE: 97 BPM | DIASTOLIC BLOOD PRESSURE: 83 MMHG | SYSTOLIC BLOOD PRESSURE: 125 MMHG | WEIGHT: 265 LBS | BODY MASS INDEX: 39.25 KG/M2 | HEIGHT: 69 IN

## 2020-03-11 DIAGNOSIS — F33.1 MODERATE EPISODE OF RECURRENT MAJOR DEPRESSIVE DISORDER (HCC): Primary | ICD-10-CM

## 2020-03-11 DIAGNOSIS — F41.0 PANIC DISORDER: ICD-10-CM

## 2020-03-11 DIAGNOSIS — F43.81 GRIEF REACTION WITH PROLONGED BEREAVEMENT: ICD-10-CM

## 2020-03-11 DIAGNOSIS — F40.00 AGORAPHOBIA, UNSPECIFIED: ICD-10-CM

## 2020-03-11 RX ORDER — DESVENLAFAXINE SUCCINATE 50 MG/1
50 TABLET, EXTENDED RELEASE ORAL DAILY
Qty: 30 TAB | Refills: 5 | Status: SHIPPED | OUTPATIENT
Start: 2020-03-11

## 2020-03-11 RX ORDER — PROPRANOLOL HYDROCHLORIDE 20 MG/1
20 TABLET ORAL
Qty: 30 TAB | Refills: 5 | Status: SHIPPED | OUTPATIENT
Start: 2020-03-11

## 2020-03-11 NOTE — PROGRESS NOTES
Psychiatric Outpatient Progress Note    Account Number:  [id number]  Name: Ida Fu    SUBJECTIVE:     CHIEF COMPLAINT:    HPI:  Ida Fu is a 22 y.o. adult and was seen today for follow-up of psychiatric condition and psychotropic medication management. Mr. Ana Rosa Boyd is a \"He is a female-to-male transgender\" with  major depression, panic disorder, allergic rhinitis, and obesity. He has  underwent bilateral breast reduction surgery on 18.  Receives testosterone injections at [facility]; testosterone levels followed there. He reports having been depressed and anxious for years because he did not feel accepted and felt different from the onset. The separation and divorce of his parents had traumatic effect on him , how his father ( working in [employer]) could betray his family and remarry. He was close to his paternal grandparent who . He feels guilty for his mother's ailments and having problems with his situation. He reports having performed well academically but couldn't make friends, remained avoidant,isolative, introvert . He would become anxious in crowds, avoided grocery shopping and or even getting his mail. Began to get help in the senior year of college because he was failing all his classes.     Reports depression and anxiety are generally controlled with Pristiq but WEPT THRU THE ENTIRE SESSION AS HE LAMENTED ABOUT HIS CONDITION/SITUATION,PREDICAMENT. He began receiving care at the \" [facility]\" working with Mary Kimbrough, then with Dr. Renaldo Villasenor (Psychiatry) then at [facility] ( Dr. Tod Carrel) in  and then  Lorne Lanes, PhD who helped with his  OCD like symptoms. He has never been hospitalized nor attempted suicide. He denies any substance use disorder. He denies any a sexual or major traumatic events to cause PTSD.           STRESSORS and or Precipitating factors:   Mother disabled since  due to muscular dystrophy  Paternal grandparents  to whom he was very close to ( grandma in 2018)  Surgery for Hysterectomy approaching     PERSONAL COPING STYLEs :  Isolate,withdraw,dissociate, knit, talk self down,            REVIEW OF  PSYCHIATRIC SYSTEMS:  Patient did not meet criteria for a  PTSD, Schizophrenia, Bipolar Affective Disorder, Obsessive Compulsive Disorder, Anxiety Disorder, Agoraphobia, EATING DISORDER,SUBSTANCE USE DISORDER,  Psychotic disorders or ASD.        PAST PSYCHIATRIC HISTORY:   Outpatient Psychiatric treatments: \"Discovery counseling\"  With [facility]sor[facility], Dr. Pricila Nina (Psychiatry) is moving to out of [facility]; he is scheduled to see a new psychiatrist at [facility] ( Dr. Adriel Lezama) in Jan and a OCD specialist next month. Jacquie Sprageu following with a therapist. Gary Salgado, PhD for OCD ( used to count,writing over and over again)     Suicide attempts/self inflictive behaviors:none  Hospitalizations:  none  Medications Tried:  Zoloft and Wellbutrin  ECT,TMS or Ketamine infusion:   none  Legal/Assault History:  none     PAST SUBSTANCE ABUSE HISTORY:  unremarkable     FAMILY PSYCH HISTORY: maternal aunt with bipolar disorder( ?alcoholic),another is odd,paternal great aunts were bizarre        SOCIAL HISTORY: Only child born to an intact marriage, parents  when he was 12 y/o, dad moved to [place] and remarried. He denies any physical or sexual abuse. He was bullied in middle school. He received his BA in History  From [employer].  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      PAST MEDICAL/SURGICAL HISTORY:  Obesity and Vit D deficiency     ----------------------------------------------------------------------------------------------------------------------------------------------------------------------------------------------------------------------------------------------------------------------------------------------------------------------------------------------------------------------------------------------------------------------------------------------------------------------------------------------------------  Course of events since last visit: The patient returns for his scheduled appointment. He was placed on a higher dose of Pristiq but reports that he didn't see a difference and so stopped taking it. He is only taking 50mg as before. He does take the Inderal as needed. He had a hysterectomy that went well. He is planning to enroll in graduate studies pertaining to his work ( [employer]). He continues to work with his counselor and feels he may not need a psychiatrist at this junction. 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 He has lost weight ( 5 lbs since last visit) and vital are WNL        Fam/Social STATUS  OR changes: Underwent hysterectomy on Jan. 31,2020     Side Effects:  none      REVIEW OF SYSTEMS:  Pertinent items are noted in HPI/above.     Visit Vitals  /83 (BP 1 Location: Left arm, BP Patient Position: Sitting)   Pulse 97   Ht 5' 9\" (1.753 m)   Wt 120.2 kg (265 lb)   BMI 39.13 kg/m²       OBJECTIVE:                 Mental Status exam: WNL except for      Attitude/Behavior  cooperative,     Eye Contact    appropriate   Appearance:  age appropriate and casually dressed   Motor Behavior/Gait:  within normal limits   Speech:  normal pitch, normal volume and non-pressured   Thought Process: goal directed and within normal limits linear   Thought Content free of delusions and obsessions   Perceptual distortions  Patient denied any visual,auditory,olfactory or gustatory hallucinations. No illusions were reported or noted eithter   Suicidal ideations no plan  and no intention   Homicidal ideations no plan  and no intention   Mood:  stable   Affect:  mood-congruent     Orientation/sensorium  Alert and oriented to  date,place, situation and persons   Memory recent  adequate   Memory remote:  adequate   Concentration:  adequate   Abstraction:  abstract   Judgment &Insight:  good   Reliability good           MEDICAL DECISION MAKING:     Data REVIEWED: pertinent labs, imaging, medical records and diagnostic tests reviewed and incorporated in diagnosis and treatment plan    Allergies   Allergen Reactions    Latex Hives        Current Outpatient Medications   Medication Sig Dispense Refill    desvenlafaxine succinate (PRISTIQ) 50 mg ER tablet Take 1 Tab by mouth daily. 30 Tab 5    propranoloL (INDERAL) 20 mg tablet Take 1 Tab by mouth two (2) times daily as needed for Other (anxiety). Indications: take 1/2 hour before the event 30 Tab 5    testosterone (ANDROGEL) 20.25 mg/1.25 gram (1.62 %) gel 40.5 mg by TransDERmal route daily.  fexofenadine (ALLEGRA) 180 mg tablet Take 1 Tab by mouth daily.  Cholecalciferol, Vitamin D3, (VITAMIN D3) 1,000 unit Cap Take 1,000 Units by mouth daily. ICD-10-CM ICD-9-CM    1. Moderate episode of recurrent major depressive disorder (HCC) F33.1 296.32 desvenlafaxine succinate (PRISTIQ) 50 mg ER tablet   2. Panic disorder F41.0 300.01    3. Agoraphobia, unspecified F40.00 300.22 desvenlafaxine succinate (PRISTIQ) 50 mg ER tablet      propranoloL (INDERAL) 20 mg tablet   4. Grief reaction with prolonged bereavement F43.21 309.0 desvenlafaxine succinate (PRISTIQ) 50 mg ER tablet       Orders Placed This Encounter    desvenlafaxine succinate (PRISTIQ) 50 mg ER tablet     Sig: Take 1 Tab by mouth daily.      Dispense:  30 Tab     Refill:  5    propranoloL (INDERAL) 20 mg tablet     Sig: Take 1 Tab by mouth two (2) times daily as needed for Other (anxiety). Indications: take 1/2 hour before the event     Dispense:  30 Tab     Refill:  5       Assessment:   Dale Toribio  is a 22 y.o.  adult  is  responding to treatment. Symptoms have       Patient denies SI/HI/SIB  SUICIDE RISK:      Treatment Plan  Treatment plan reviewed with patient-including diagnosis and medications:    1. Medication Changes/Adjustments: Will provide ample refills for his medications for now. Current Outpatient Medications   Medication Sig Dispense Refill    desvenlafaxine succinate (PRISTIQ) 50 mg ER tablet Take 1 Tab by mouth daily. 30 Tab 5    propranoloL (INDERAL) 20 mg tablet Take 1 Tab by mouth two (2) times daily as needed for Other (anxiety). Indications: take 1/2 hour before the event 30 Tab 5    testosterone (ANDROGEL) 20.25 mg/1.25 gram (1.62 %) gel 40.5 mg by TransDERmal route daily.  fexofenadine (ALLEGRA) 180 mg tablet Take 1 Tab by mouth daily.  Cholecalciferol, Vitamin D3, (VITAMIN D3) 1,000 unit Cap Take 1,000 Units by mouth daily. The risks, benefits of the proposed medication and the potential medication side effects (ie dry mouth, weight gain, GI upset, headache,tremors, extrapyramidal side effects, sexual dysfunction, suicidal thoughts,sweating) etc.were discussed . The potential for dependence and addiction discussed. The patient was given the opportunity to ask questions. The patient was informed of the consequences of refusing medications and non-compliance. Patient agreed with this plan. PSYCHOTHERAPY:  approx 15 minutes  Supportive/Cognitive Behavioral/Solution Focused psychotherapy provided  Discussed rational versus irrational thinking patterns and their consequences. Discussed healthy/adaptive and unhealthy/maladaptive coping.   Homework given regarding:   Psycho-education/ handouts provided:  none    LABORATORY ORDERS & OR REFERRALS:     Patient instructed to call or [facility] to [facility] with any side effects, questions or issues. Mr. Soto Thompson has a reminder for a \"due or due soon\" health maintenance. I have asked that he contact his primary care provider for follow-up on this health maintenance. Ambar Rehman is progressing/stable. Follow-up and Dispositions    · Return if symptoms worsen or fail to improve. Mady Osler, MD  3/11/2020      TIME SPENT FACE TO FACE WITH THE PATIENT: 20 MINUTES minute visit spent counseling regarding her extensive symptoms, reviewing previous records and coordinating care. There are other unrelated non-urgent complaints, but due to the busy schedule and the amount of time I've already spent with him, time does not permit me to address these routine issues at today's visit. I've requested another appointment to review these additional issues.

## 2020-06-15 NOTE — IP AVS SNAPSHOT
Carla Dickerson 
 
 
 [address] 
[phone] Patient: Santo Reyna MRN: [id number] :1995 About your hospitalization You were admitted on:  2018 You last received care in the:  [facility] 4M POST SURG ORT 2 You were discharged on:  2018 Why you were hospitalized Your primary diagnosis was:  Not on File Your diagnoses also included:  Gender Identity Disorder Follow-up Information Follow up With Details Comments Contact Info Emerita Robles MD   [facility] [address] [phone] Discharge Orders None A check donna indicates which time of day the medication should be taken. My Medications CONTINUE taking these medications Instructions Each Dose to Equal  
 Morning Noon Evening Bedtime  
 albuterol 90 mcg/actuation inhaler Commonly known as:  PROVENTIL HFA, VENTOLIN HFA, PROAIR HFA Notes to Patient:  Did not take while in hospital. Resume home schedule Take 2 Puffs by inhalation every six (6) hours as needed for Wheezing. 2 Puff ALLEGRA-D 12 HOUR  mg Tb12 Generic drug:  fexofenadine-pseudoephedrine Notes to Patient:  Not taken while in hospital. Resume previous schedule Take 1 Tab by mouth daily. 1 Tab ALPRAZolam 0.5 mg tablet Commonly known as:  Yaquelin Mcclain Notes to Patient:  Did not take while in hospital. Resume home schedule Take 1 Tab by mouth two (2) times daily as needed for Anxiety. Max Daily Amount: 1 mg. Indications: ANXIETY WITH DEPRESSION  
 0.5 mg Desvenlafaxine 100 mg Tb24 Notes to Patient:  Not taken in hospital. Resume home schedule Take 100 mg by mouth daily. 100 mg  
    
   
   
   
  
 testosterone cypionate 200 mg/mL injection Commonly known as:  DEPOTESTOTERONE CYPIONATE Patient Education   Thank you for visiting the [facility].    Please understand this is a provisional diagnosis that can and may change. The diagnosis that you are discharged with is based on the symptoms you described and the diagnostic information available today. If any new symptoms occur or worsen, you should seek immediate re-evaluation at the nearest emergency department. If any symptoms persist, please follow up for reassessment with your primary care provider.     It is difficult to recognize all elements of any illness or injury in a single visit. The examination, treatment, and x-rays received are on a preliminary basis only.  Call your primary care provider if you have questions or problems before your next appointment. If you are unable to  reach your Primary Care Provider (PCP), please call or return to the Urgent Care Clinic.  If symptoms worsen or do not resolve please follow-up with your Primary Care Provider (PCP), walk-in or the nearest  emergency room for emergency symptoms.  If you are unsure of whom to follow up with, call [phone] ([place]) or [phone] ([place]) and ask to speak with either your Primary care provider, the urgent care nurse or the on-call provider if you are calling after hours.      If you are referred to a specialist or scheduled for a test, our referrals department will call you with your appointment date and time within 3 business days. If you have not heard from them in this time frame, please call numbers mentioned above and ask for the referrals department.     Clinic Hours [place]:    [place]  Monday through Friday: 7 am to 7 pm   M-F: 7 am to 7 pm   Saturday: 8-2 pm      Saturday: 7-2  Sunday: 8-12 pm      Sun 7-2  Holiday hours may vary.   Please call [phone] on Holidays.  Walk-in is open 365 days a year!!    Help us to grow our quality of service!  We want to improve - and you can help!  You may receive a survey in the mail.   Notes to Patient:  Did not take whlie in hospital. Resume home schedule INJECT 0.5 ML INTO THE MUSCLE Q WEEK  
     
   
   
   
  
 VITAMIN D3 1,000 unit Cap Generic drug:  cholecalciferol Notes to Patient:  Did not take in hospital. Resume home schedule Take 1,000 Units by mouth daily. 1000 Units STOP taking these medications   
 azithromycin 250 mg tablet Commonly known as:  ZITHROMAX  
   
  
 predniSONE 10 mg dose pack Commonly known as:  STERAPRED DS Discharge Instructions Top Surgery Patient Instructions Immediately Following Surgery: After surgery you will rest quietly for the first 48 hours. You will be able to walk around the house and perform light daily activities; however, during this time, it is not at all unusual for you to feel some pain, soreness and pressure in the chest area. This will gradually subside and Dr. Dinorah Lopez will give you pain medication to relieve it. You must take the entire prescription of antibiotics. Be sure to lie on your back whenever you rest or sleep. Keep your head elevated for 72 hours after surgery as this will help with swelling. The surgery binder that you have been put in should be worn constantly during the day and at night. You will then be allowed to shower two days after surgery. Vennie [place] yourself everywhere, including the tapes and your incisions. Use mild soap and pat yourself dry and put the binder back on. This daily routine will help keep the incisions clean, and will promote wound healing. Do not submerge yourself in a bath, swimming pool, or whirlpool for four weeks. You will wear the chest binder for a total of two to three weeks after surgery. The small tape strips covering your incisions. These will remain in place for seven days and will peel off by themselves.   
 
A few patients react to the anesthetic after surgery with nausea and This is your opportunity to tell us where we could use improvement or what excellent service you received.  We value your input!     Thank you again for visiting [facility]  .  Alethea Garay PA-C          Fish Hook Removed  A fish hook has been removed from under your skin. This area may be sore for the next 1 to 2 days. Because this was a dirty puncture-type wound, the risk of infection is higher than normal. Antibiotics may or may not be prescribed depending on various factors such as depth, severity, and location. You may be given a tetanus shot if needed.  Home care  Your healthcare provider will give you instructions on how to care for your wound. These will depend on where the wound is and how serious it is. The following may help you care for your wound at home:  · Keep the injured part elevated during the first 2 days. This will help reduce swelling and pain.  · Keep the wound clean and dry. If a bandage was applied and it becomes wet or dirty, replace it. Otherwise, leave it in place for the first 24 hours.  · Shower as usual, unless your healthcare provider tells you otherwise.  · Don't soak in a tub or go swimming for at least 1 week or as instructed by your doctor.  · Don't soak the injured area unless your doctor tells you to.  · Use acetaminophen or ibuprofen to control pain, unless another pain medicine was prescribed. If you have chronic liver or kidney disease or ever had a stomach ulcer or GI bleeding, talk with your doctor before using these medicines.  Follow-up care  Most puncture wounds heal within 10 days. However, an infection may sometimes occur despite proper treatment. Check the wound daily for the warning signs listed below.  If stitches were used, they should be removed within 7 to 10 days. If a tape closure was used, remove them after 5 days unless told otherwise.  If any X-rays were taken, a radiologist will look at them. You will be notified if there are new  findings that may affect your care.  When to seek medical advice  Call your healthcare provider if any of these occur:  · Increasing pain in the wound  · Redness, swelling or pus coming from the wound  · Fever of 100.4ºF (38ºC) or higher, or as directed by your healthcare provider  Date Last Reviewed: 10/1/2016  © 8121-1403 [employer]. [address] [place] [address]. All rights reserved. This information is not intended as a substitute for professional medical care. Always follow your healthcare professional's instructions.            vomiting. This usually lasts less than 24 hours and should be treated with lots of fluids. Dr. Brendon Perdomo will prescribe nausea medicine for during your preoperative visit. The maximum swelling occurs at about three days and then begins to dramatically improve. Mild bruising typically resolves within 14 days. You should plan to be off work for 1-2 weeks, although this can vary from person to person. Additional Post-Operative Instructions: No heavy exercise or lifting for three weeks following surgery. For the first three days following surgery you should try to restrict your arm movements. Move your arms slowly and avoid sudden jerky movements of the chest and breast area. Keep your arms as close to your body as possible. Dr. Brendon Perdomo encourages walking immediately after surgery. This activity will greatly minimize the risk of blood clots in your leg veins. Do not expose your breasts to the sun for eight weeks after surgery. Please notify Dr. Brendon Perdomo if: 
? If your one breast becomes significantly larger than the other ? If you develop significant bruising across the chest  
? If you experience a significant increase in pain in the breast after the pain has improved ? If you develop a temperature above 101.5° F 
? If you develop redness (like a sunburn) around your incisions If you need help or have any questions feel free to call Dr Brendon Perdomo with your concerns. Dr. Brendon Perdomo is on call 24 hours per day, seven days per week and has an answering service to forward calls. The quality of your cosmetic enhancement may be compromised if you fail to return for any scheduled post-op visits, or follow the pre- and post-operative instructions. Please dont hesitate to report any unusual or concerning changes. Our number is [phone]. Start anti biotics this evening Colace given at 09:36 Lovenox given at 06:38  
 
 Dilaudid given 9/7/18 in evening. May take today as needed for pain Introducing [facility]! Dear Domingo Barros: Thank you for requesting a [employer] account. Our records indicate that you already have an active ClearAccess account. You can access your account anytime at [email or web address] Did you know that you can access your hospital and ER discharge instructions at any time in ClearAccess? You can also review all of your test results from your hospital stay or ER visit. Additional Information If you have questions, please visit the Frequently Asked Questions section of the ClearAccess website at [email or web address] Remember, ClearAccess is NOT to be used for urgent needs. For medical emergencies, dial 911. Now available from your iPhone and Android! Introducing Deion Sno As a [employer] patient, I wanted to make you aware of our electronic visit tool called [employer] Son. [employer] 24/7 allows you to connect within minutes with a medical provider 24 hours a day, seven days a week via a mobile device or tablet or logging into a secure website from your computer. You can access Deion Son from anywhere in the [place]. A virtual visit might be right for you when you have a simple condition and feel like you just dont want to get out of bed, or cant get away from work for an appointment, when your regular [employer] provider is not available (evenings, weekends or holidays), or when youre out of town and need minor care. Electronic visits cost only $49 and if the [employer] 24/7 provider determines a prescription is needed to treat your condition, one can be electronically transmitted to a nearby pharmacy*. Please take a moment to enroll today if you have not already done so. The enrollment process is free and takes just a few minutes.   To enroll, please download the [employer] 24/7 scar to your tablet or phone, or visit [email or web address] to enroll on your computer. And, as an [address] [place] [address] patient with a [employer] account, the results of your visits will be scanned into your electronic medical record and your primary care provider will be able to view the scanned results. We urge you to continue to see your regular [employer] provider for your ongoing medical care. And while your primary care provider may not be the one available when you seek a [employer] virtual visit, the peace of mind you get from getting a real diagnosis real time can be priceless. For more information on [employer], view our Frequently Asked Questions (FAQs) at [email or web address]. Sincerely, 
 
Marlen Clark MD 
Chief Medical Officer [facility] Rubi Amaya *:  certain medications cannot be prescribed via [employer] Providers Seen During Your Hospitalization Provider Specialty Primary office phone Vickie Elkins MD Plastic Surgery [phone] Your Primary Care Physician (PCP) Primary Care Physician Office Phone Office Fax Luis Miguel Carmichael [phone] You are allergic to the following Allergen Reactions Latex Hives Recent Documentation Height Weight Breastfeeding? BMI OB Status Smoking Status 1.753 m 114.9 kg No 37.41 kg/m2 Unknown Never Smoker Emergency Contacts Name Discharge Info Relation Home Work Mobile KnightMilena verde DISCHARGE CAREGIVER [3] Mother [14] [phone] Patient Belongings The following personal items are in your possession at time of discharge: 
  Dental Appliances: None  Visual Aid: Glasses, With patient             Clothing:  (denies valuables. clothing to locker) Please provide this summary of care documentation to your next provider. Signatures-by signing, you are acknowledging that this After Visit Summary has been reviewed with you and you have received a copy. Patient Signature:  ____________________________________________________________ Date:  ____________________________________________________________  
  
Eli Midget Provider Signature:  ____________________________________________________________ Date:  ____________________________________________________________

## 2020-07-29 ENCOUNTER — OFFICE VISIT (OUTPATIENT)
Dept: URGENT CARE | Age: 25
End: 2020-07-29

## 2020-07-29 VITALS — TEMPERATURE: 98.8 F | OXYGEN SATURATION: 95 % | HEART RATE: 100 BPM | RESPIRATION RATE: 20 BRPM

## 2020-10-30 ENCOUNTER — VIRTUAL VISIT (OUTPATIENT)
Dept: INTERNAL MEDICINE CLINIC | Age: 25
End: 2020-10-30
Payer: COMMERCIAL

## 2020-10-30 DIAGNOSIS — R42 VERTIGO: Primary | ICD-10-CM

## 2020-10-30 PROCEDURE — 99214 OFFICE O/P EST MOD 30 MIN: CPT | Performed by: INTERNAL MEDICINE

## 2020-10-30 RX ORDER — MECLIZINE HYDROCHLORIDE 25 MG/1
25 TABLET ORAL
Qty: 30 TAB | Refills: 0 | Status: SHIPPED | OUTPATIENT
Start: 2020-10-30 | End: 2020-11-09

## 2020-10-30 NOTE — PROGRESS NOTES
Jamia Rodriguez is a 22 y.o. adult who was seen by synchronous (real-time) audio-video technology on 10/30/2020 for Dizziness (x 2 or 3 weeks // more severe this past week and hapening more often //)  This visit was completed virtually using doxy. me      Assessment & Plan:   Diagnoses and all orders for this visit:    1. Vertigo  -     meclizine (ANTIVERT) 25 mg tablet; Take 1 Tab by mouth three (3) times daily as needed for Dizziness for up to 10 days. symptoms c/w positional vertigo - if not resolved will need to be seen in person  Also rec checking bp and calling if high  Follow-up and Dispositions    · Return if symptoms worsen or fail to improve. Subjective:     Vertigo, motion sick feeling for about 2-3 weeks, getting worse  Feeling like in moving vehicle when still    Has been having spells for a few weeks - not daily and not lasting all day    Associated nausea, no vomiting  Has had some HA - but feel like usual HA with tension/stress  No vision changes, no focal weakness  Symptoms improved with sitting still, worse with movement    Hasn't checked bp recently  Some sneezing, chronic allergy symptoms for which he takes allegra      Prior to Admission medications    Medication Sig Start Date End Date Taking? Authorizing Provider   meclizine (ANTIVERT) 25 mg tablet Take 1 Tab by mouth three (3) times daily as needed for Dizziness for up to 10 days. 10/30/20 11/9/20 Yes Pito Ferrell MD   desvenlafaxine succinate (PRISTIQ) 50 mg ER tablet Take 1 Tab by mouth daily. 3/11/20  Yes Tito Barr MD   propranoloL (INDERAL) 20 mg tablet Take 1 Tab by mouth two (2) times daily as needed for Other (anxiety). Indications: take 1/2 hour before the event 3/11/20  Yes Sultana ROWAN Barr MD   testosterone (ANDROGEL) 20.25 mg/1.25 gram (1.62 %) gel 40.5 mg by TransDERmal route daily. 1/19/20  Yes Provider, Historical   fexofenadine (ALLEGRA) 180 mg tablet Take 1 Tab by mouth daily.    Yes Provider, Historical   Cholecalciferol, Vitamin D3, (VITAMIN D3) 1,000 unit Cap Take 1,000 Units by mouth daily. Yes Provider, Historical         ROS  Per HPI  Objective:     Patient-Reported Vitals 10/30/2020   Patient-Reported Weight 260   Patient-Reported Height 5'9\"        [INSTRUCTIONS:  \"[x]\" Indicates a positive item  \"[]\" Indicates a negative item  -- DELETE ALL ITEMS NOT EXAMINED]    Constitutional: [x] Appears well-developed and well-nourished [x] No apparent distress      [] Abnormal -     Mental status: [x] Alert and awake  [x] Oriented to person/place/time [x] Able to follow commands    [] Abnormal -     Eyes:   EOM    [x]  Normal    [] Abnormal -   Sclera  [x]  Normal    [] Abnormal -          Discharge [x]  None visible   [] Abnormal -     HENT: [x] Normocephalic, atraumatic  [] Abnormal -   [x] Mouth/Throat: Mucous membranes are moist    External Ears [x] Normal  [] Abnormal -    Neck: [x] No visualized mass [] Abnormal -     Pulmonary/Chest: [x] Respiratory effort normal   [x] No visualized signs of difficulty breathing or respiratory distress        [] Abnormal -      Musculoskeletal:   [] Normal gait with no signs of ataxia         [x] Normal range of motion of neck        [] Abnormal -     Neurological:        [x] No Facial Asymmetry (Cranial nerve 7 motor function) (limited exam due to video visit)          [x] No gaze palsy        [] Abnormal -          Skin:        [x] No significant exanthematous lesions or discoloration noted on facial skin         [] Abnormal -            Psychiatric:       [x] Normal Affect [] Abnormal -        [x] No Hallucinations    Other pertinent observable physical exam findings:-        We discussed the expected course, resolution and complications of the diagnosis(es) in detail. Medication risks, benefits, costs, interactions, and alternatives were discussed as indicated.   I advised him to contact the office if his condition worsens, changes or fails to improve as anticipated. He expressed understanding with the diagnosis(es) and plan. Myesha Bonilla, who was evaluated through a patient-initiated, synchronous (real-time) audio-video encounter, and/or his healthcare decision maker, is aware that it is a billable service, with coverage as determined by his insurance carrier. He provided verbal consent to proceed: Yes, and patient identification was verified. It was conducted pursuant to the emergency declaration under the [address] [place] [address] and the official[employer] and [employer] Act. A caregiver was present when appropriate. Ability to conduct physical exam was limited. I was at home. The patient was at home.       Tammy Johnson MD

## 2020-10-30 NOTE — PROGRESS NOTES
Dami Kinsey  Identified pt with two pt identifiers(name and ). Chief Complaint   Patient presents with    Dizziness     x 2 or 3 weeks // more severe this past week and hapening more often //       Reviewed record In preparation for visit and have obtained necessary documentation. 1. Have you been to the ER, urgent care clinic or hospitalized since your last visit? No     2. Have you seen or consulted any other health care providers outside of the [address] [place] [address] since your last visit? Include any pap smears or colon screening. No    Patient does not have an advance directive. Vitals reviewed with provider. Health Maintenance reviewed:     Health Maintenance Due   Topic    Flu Vaccine (1)    PAP AKA CERVICAL CYTOLOGY           Wt Readings from Last 3 Encounters:   20 265 lb (120.2 kg)   20 270 lb (122.5 kg)   19 270 lb (122.5 kg)        Temp Readings from Last 3 Encounters:   20 98.8 °F (37.1 °C)   19 98.3 °F (36.8 °C) (Oral)   19 98.5 °F (36.9 °C) (Oral)        BP Readings from Last 3 Encounters:   20 125/83   20 113/76   19 128/82        Pulse Readings from Last 3 Encounters:   20 100   20 97   20 77      There were no vitals filed for this visit.        Learning Assessment:   :       Learning Assessment 2020 3/10/2015   PRIMARY LEARNER Patient Patient   HIGHEST LEVEL OF EDUCATION - PRIMARY LEARNER  4 YEARS OF COLLEGE 2 YEARS OF COLLEGE   BARRIERS PRIMARY LEARNER - NONE   CO-LEARNER CAREGIVER - No   PRIMARY LANGUAGE ENGLISH ENGLISH   LEARNER PREFERENCE PRIMARY DEMONSTRATION LISTENING     LISTENING -     READING -   ANSWERED BY self patient   RELATIONSHIP SELF SELF        Depression Screening:   :       3 most recent PHQ Screens 2020   PHQ Not Done -   Little interest or pleasure in doing things More than half the days   Feeling down, depressed, irritable, or hopeless More than half the days   Total Score PHQ 2 4   Trouble falling or staying asleep, or sleeping too much Several days   Feeling tired or having little energy Several days   Poor appetite, weight loss, or overeating Not at all   Feeling bad about yourself - or that you are a failure or have let yourself or your family down Nearly every day   Trouble concentrating on things such as school, work, reading, or watching TV More than half the days   Moving or speaking so slowly that other people could have noticed; or the opposite being so fidgety that others notice Several days   Thoughts of being better off dead, or hurting yourself in some way Several days   PHQ 9 Score 13   How difficult have these problems made it for you to do your work, take care of your home and get along with others Very difficult        Fall Risk Assessment:   :     No flowsheet data found. Abuse Screening:   :     No flowsheet data found.      ADL Screening:   :       ADL Assessment 4/30/2018   Feeding yourself No Help Needed   Getting from bed to chair No Help Needed   Getting dressed No Help Needed   Bathing or showering No Help Needed   Walk across the room (includes cane/walker) No Help Needed   Using the telphone No Help Needed   Taking your medications No Help Needed   Preparing meals No Help Needed   Managing money (expenses/bills) No Help Needed   Moderately strenuous housework (laundry) No Help Needed   Shopping for personal items (toiletries/medicines) No Help Needed   Shopping for groceries No Help Needed   Driving No Help Needed   Climbing a flight of stairs No Help Needed   Getting to places beyond walking distances No Help Needed

## 2021-06-21 ENCOUNTER — VIRTUAL VISIT (OUTPATIENT)
Dept: ENDOCRINOLOGY | Age: 26
End: 2021-06-21
Payer: MEDICAID

## 2021-06-21 DIAGNOSIS — Z78.9 FEMALE-TO-MALE TRANSGENDER PERSON: Primary | ICD-10-CM

## 2021-06-21 PROCEDURE — 99204 OFFICE O/P NEW MOD 45 MIN: CPT | Performed by: INTERNAL MEDICINE

## 2021-06-21 NOTE — PROGRESS NOTES
Chief Complaint   Patient presents with    Hypogonadism     pcp and pharmacy verified            **THIS IS A VIRTUAL VISIT VIA A VIDEO ENCOUNTER. PATIENT AGREED TO HAVE THEIR CARE DELIVERED OVER VIDEO IN PLACE OF THEIR REGULARLY SCHEDULED OFFICE VISIT**      History of Present Illness: Sandee Bush is a 32 y.o. adult who I was asked to see in consult by Dr. 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 \"I need help with testosterone management. I started in November in 2017, I was doing sub-Q injections. In January 2020 I switched over to gel. \" He is currently using the generic Testosterone 1.62%, he takes two pumps every day. He was started on Testosterone by another Endocrinologist, Dr. Ellie Luevano., now in [place]. \"I am now on Medicaide and she does not take medicade so she does not take it so I need a new endocrinologist.\"  Pt was followed by Dr. Meaghan Mcmahan of psychiatry for major depression, panic disorder and female to male transgender, but she has retired and he is not seeing Dr. Kady Calderon. Pt feels that with the two pumps per day of the Testosterone he is doing well clinically. He notes that he was developing scar tissue with the Sub-Q injections. He denies issues of CP, SOB, palpitations, mood swings, increased aggression. He denies issues of HA, vision changes. He denies issues of breast pain or gynecomastia. He denies issue of rash or skin breakdown at the application sites. He has received both COVID vaccinations (Alexandre Coad).     Past Medical History:   Diagnosis Date    Anxiety     Migraines 2007    patient states no longer having migraines as of 8/15/18    Mild depression ([facility] [address] 3/10/2015    Obesity 2007    RAD (reactive airway disease) 8303,6157    patient denies having this currently as of 8/15/18    Rhinitis 2005    Allergic & Seasonal     Past Surgical History:   Procedure Laterality Date    HX BREAST REDUCTION Bilateral 9/6/2018    BILATERAL BREAST REDUCTION WITH FREE NIPPLE GRAFT, LIPOSUCTION TO CHEST WALL (LATEX ALLERGY) performed by Bernerd Hammans, MD at [address] HX OTHER SURGICAL Right 4/2013    had piece of bone removed from right arm    HX TOTAL VAGINAL HYSTERECTOMY  01/31/2020    Dr. Owen Floyd ([facility]). TVH with BSO     Current Outpatient Medications   Medication Sig    desvenlafaxine succinate (PRISTIQ) 50 mg ER tablet Take 1 Tab by mouth daily.  propranoloL (INDERAL) 20 mg tablet Take 1 Tab by mouth two (2) times daily as needed for Other (anxiety). Indications: take 1/2 hour before the event    testosterone (ANDROGEL) 20.25 mg/1.25 gram (1.62 %) gel 40.5 mg by TransDERmal route daily. 2 pumps daily (one pump per shoulder)    fexofenadine (ALLEGRA) 180 mg tablet Take 1 Tab by mouth daily. No current facility-administered medications for this visit.      Allergies   Allergen Reactions    Latex Hives     Family History   Problem Relation Age of Onset    Other Mother         MYASTHENIA GRAVIS    Hypertension Mother     Diabetes Mother     Heart Disease Maternal Grandmother     Cancer Maternal Grandmother         breast cancer and another type of cancer    Heart Failure Maternal Grandmother     Cancer Maternal Aunt         stomach    Diabetes Maternal Aunt     Diabetes Maternal Uncle     No Known Problems Father     Cancer Maternal Grandfather         Unknown    Cancer Paternal Grandmother         brain     Social History     Socioeconomic History    Marital status: SINGLE     Spouse name: Not on file    Number of children: Not on file    Years of education: Not on file    Highest education level: Not on file   Occupational History    Not on file   Tobacco Use    Smoking status: Never Smoker    Smokeless tobacco: Never Used   Substance and Sexual Activity    Alcohol use: No    Drug use: No    Sexual activity: Never   Other Topics Concern    Not on file   Social History Narrative    Not on file     Social Determinants of Health     Financial Resource Strain:     Difficulty of Paying Living Expenses:    Food Insecurity:     Worried About Running Out of Food in the Last Year:     [address] in the Last Year:    Transportation Needs:     Lack of Transportation (Medical):  Lack of Transportation (Non-Medical):    Physical Activity:     Days of Exercise per Week:     Minutes of Exercise per Session:    Stress:     Feeling of Stress :    Social Connections:     Frequency of Communication with Friends and Family:     Frequency of Social Gatherings with Friends and Family:     Attends Sikh Services:     Active Member of Clubs or Organizations:     Attends Club or Organization Meetings:     Marital Status:    Intimate Partner Violence:     Fear of Current or Ex-Partner:     Emotionally Abused:     Physically Abused:     Sexually Abused:      Review of Systems:  - Constitutional Symptoms: no fevers, chills, weight loss  - Eyes: no blurry vision or double vision  - Cardiovascular: no chest pain or palpitations  - Respiratory: no cough or shortness of breath  - Gastrointestinal: no dysphagia or abdominal pain  - Musculoskeletal: no joint pains or weakness  - Integumentary: no rashes  - Neurological: no numbness, tingling, or headaches  - Psychiatric: no depression or anxiety  - Endocrine: no heat or cold intolerance, no polyuria or polydipsia    Physical Examination:  There were no vitals taken for this visit.   - GENERAL: NCAT, Appears well nourished   - EYES: EOMI, non-icteric, no proptosis   - Ear/Nose/Throat: NCAT, no visible inflammation or masses   - CARDIOVASCULAR: no cyanosis, no visible JVD   - RESPIRATORY: respiratory effort normal without any distress or labored breathing   - MUSCULOSKELETAL: Normal ROM of neck and upper extremities observed   - SKIN: No rash on face   - NEUROLOGIC:  No facial asymmetry (Cranial nerve 7 motor function), No gaze palsy   - PSYCHIATRIC: Normal affect, Normal insight and judgement   -     Data Reviewed: Will request records from Dr. Jasmin Coon. Assessment/Plan:   1. Female-to-male transgender person    1) Pt has been on a stable dose of the Testosterone 1.62%, two pumps daily since January 2020. He is tolerating this dose well and is not having any concerning side effects. He is pleased with the effects from the Testosterone. Will order Testosterone and CBC, we discussed adjusting his dose as needed. He received a letter from his insurance stating that they will start requiring branded Androgel 1.62% starting on July 1, 2021. Pt voices understanding and agreement with the plan.     RTC based on the above results    Copy sent to:  Dr. Rachel Gonzalez

## 2021-06-27 LAB
ERYTHROCYTE [DISTWIDTH] IN BLOOD BY AUTOMATED COUNT: 11.7 % (ref 11.6–15.4)
HCT VFR BLD AUTO: 45.9 % (ref 37.5–51)
HGB BLD-MCNC: 15.9 G/DL (ref 13–17.7)
MCH RBC QN AUTO: 32.3 PG (ref 26.6–33)
MCHC RBC AUTO-ENTMCNC: 34.6 G/DL (ref 31.5–35.7)
MCV RBC AUTO: 93 FL (ref 79–97)
PLATELET # BLD AUTO: 360 X10E3/UL (ref 150–450)
RBC # BLD AUTO: 4.92 X10E6/UL (ref 4.14–5.8)
TESTOST FREE SERPL-MCNC: 17.6 PG/ML (ref 9.3–26.5)
TESTOST SERPL-MCNC: 306 NG/DL (ref 264–916)
WBC # BLD AUTO: 7.4 X10E3/UL (ref 3.4–10.8)

## 2021-07-05 DIAGNOSIS — Z78.9 FEMALE-TO-MALE TRANSGENDER PERSON: Primary | ICD-10-CM

## 2021-07-05 RX ORDER — TESTOSTERONE 20.25 MG/1.25G
40.5 GEL TOPICAL DAILY
Qty: 3 BOTTLE | Refills: 3 | Status: SHIPPED | OUTPATIENT
Start: 2021-07-05 | End: 2021-12-06 | Stop reason: SDUPTHER

## 2021-07-13 ENCOUNTER — DOCUMENTATION ONLY (OUTPATIENT)
Dept: ENDOCRINOLOGY | Age: 26
End: 2021-07-13

## 2021-08-06 DIAGNOSIS — Z78.9 FEMALE-TO-MALE TRANSGENDER PERSON: Primary | ICD-10-CM

## 2021-12-01 DIAGNOSIS — Z78.9 FEMALE-TO-MALE TRANSGENDER PERSON: ICD-10-CM

## 2021-12-01 LAB
ERYTHROCYTE [DISTWIDTH] IN BLOOD BY AUTOMATED COUNT: 12 % (ref 11.6–15.4)
ESTRADIOL SERPL-MCNC: 40.1 PG/ML (ref 7.6–42.6)
ESTRONE SERPL-MCNC: 197 PG/ML (ref 15–65)
FSH SERPL-ACNC: 22.2 MIU/ML (ref 1.5–12.4)
HCT VFR BLD AUTO: 43.5 % (ref 37.5–51)
HGB BLD-MCNC: 15.5 G/DL (ref 13–17.7)
LH SERPL-ACNC: 13.9 MIU/ML (ref 1.7–8.6)
MCH RBC QN AUTO: 32.4 PG (ref 26.6–33)
MCHC RBC AUTO-ENTMCNC: 35.6 G/DL (ref 31.5–35.7)
MCV RBC AUTO: 91 FL (ref 79–97)
PLATELET # BLD AUTO: 381 X10E3/UL (ref 150–450)
RBC # BLD AUTO: 4.79 X10E6/UL (ref 4.14–5.8)
TESTOST FREE SERPL-MCNC: 38.5 PG/ML (ref 9.3–26.5)
TESTOST SERPL-MCNC: 737 NG/DL (ref 264–916)
WBC # BLD AUTO: 8.2 X10E3/UL (ref 3.4–10.8)

## 2021-12-06 ENCOUNTER — VIRTUAL VISIT (OUTPATIENT)
Dept: ENDOCRINOLOGY | Age: 26
End: 2021-12-06
Payer: MEDICAID

## 2021-12-06 DIAGNOSIS — Z78.9 FEMALE-TO-MALE TRANSGENDER PERSON: Primary | ICD-10-CM

## 2021-12-06 PROCEDURE — 99214 OFFICE O/P EST MOD 30 MIN: CPT | Performed by: INTERNAL MEDICINE

## 2021-12-06 RX ORDER — TESTOSTERONE 20.25 MG/1.25G
GEL TOPICAL
Qty: 3 EACH | Refills: 1 | Status: SHIPPED | OUTPATIENT
Start: 2021-12-06 | End: 2022-06-08 | Stop reason: SDUPTHER

## 2021-12-06 NOTE — LETTER
12/6/2021 8:52 AM    Patient:  Kendy Garcia   YOB: 1995  Date of Visit: 12/6/2021      Dear Osito Carrington, MD [facility] [address] [place] [address]: Thank you for referring Mr. Edin Ritchie to me for evaluation/treatment. Below are the relevant portions of my assessment and plan of care. If you have questions, please do not hesitate to call me. I look forward to following . Binu Pak along with you. Chief Complaint   Patient presents with    Hypogonadism     EMAIL   [email or web address]            **THIS IS A VIRTUAL VISIT VIA A VIDEO ENCOUNTER. PATIENT AGREED TO HAVE THEIR CARE DELIVERED OVER VIDEO IN PLACE OF THEIR REGULARLY SCHEDULED OFFICE VISIT**        History of Present Illness: Kendy Garcia is a 32 y.o. 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 He was first started on Testosterone in November in 2017, I was doing sub-Q injections. In January 2020 I switched over to gel. \"  At our initial visit in June 2021 he was taking the generic Testosterone 1.62%, he takes two pumps every day. Pt was followed by Dr. Dee Dee Vang of psychiatry for major depression, panic disorder and female to male transgender, but she has retired and he is not seeing Dr. Yany Bassett. He has received both COVID vaccinations ([facility] [place]). Pt denies any recent illnesses, injuries or hospitalizations. Pt is \"about half-way through\" his Master's degree. His study is \"[employer] studies\". Pt is still taking the the generic Testosterone 1.62%, he takes two pumps every day. Pt feels that with the two pumps per day of the Testosterone he is doing well clinically. He notes that he was developing scar tissue with the Sub-Q injections. He denies issues of CP, SOB, palpitations, mood swings, increased aggression. He denies issues of HA, vision changes. He denies issues of breast pain or gynecomastia.   He denies issue of rash or skin breakdown at the application sites. Current Outpatient Medications   Medication Sig    testosterone (ANDROGEL) 20.25 mg/1.25 gram (1.62 %) gel 41 mg by TransDERmal route daily. Max Daily Amount: 41 mg. 2 pumps daily (one pump per shoulder)    desvenlafaxine succinate (PRISTIQ) 50 mg ER tablet Take 1 Tab by mouth daily.  fexofenadine (ALLEGRA) 180 mg tablet Take 1 Tab by mouth daily.  propranoloL (INDERAL) 20 mg tablet Take 1 Tab by mouth two (2) times daily as needed for Other (anxiety). Indications: take 1/2 hour before the event     No current facility-administered medications for this visit. Allergies   Allergen Reactions    Latex Hives     Review of Systems:  - Cardiovascular: no chest pain  - Neurological: no tremors  - Integumentary: skin is normal    Physical Examination:  There were no vitals taken for this visit.   - GENERAL: NCAT, Appears well nourished   - EYES: EOMI, non-icteric, no proptosis   - Ear/Nose/Throat: NCAT, no visible inflammation or masses   - CARDIOVASCULAR: no cyanosis, no visible JVD   - RESPIRATORY: respiratory effort normal without any distress or labored breathing   - MUSCULOSKELETAL: Normal ROM of neck and upper extremities observed   - SKIN: No rash on face   - NEUROLOGIC:  No facial asymmetry (Cranial nerve 7 motor function), No gaze palsy   - PSYCHIATRIC: Normal affect, Normal insight and judgement   -     Data Reviewed:   Component      Latest Ref Rng & Units 11/29/2021   WBC      3.4 - 10.8 x10E3/uL 8.2   RBC      4.14 - 5.80 x10E6/uL 4.79   HGB      13.0 - 17.7 g/dL 15.5   HCT      37.5 - 51.0 % 43.5   MCV      79 - 97 fL 91   MCH      26.6 - 33.0 pg 32.4   MCHC      31.5 - 35.7 g/dL 35.6   RDW      11.6 - 15.4 % 12.0   PLATELET      415 - 301 x10E3/uL 381   Testosterone      264 - 916 ng/dL 737   Free testosterone (Direct)      9.3 - 26.5 pg/mL 38.5 (H)   Estrone, Serum      15 - 65 pg/mL 197 (H)   Estradiol      7.6 - 42.6 pg/mL 40.1   Luteinizing hormone      1.7 - 8.6 mIU/mL 13.9 (H)   FSH      1.5 - 12.4 mIU/mL 22.2 (H)       Assessment/Plan:   1) Female to Male Transgender > Pt is doing well clinically and his Testosterone level was in a good range. His H/H were unremarkable. Pt to continue the Testosterone 1.62% gel, two pumps daily. Pt voices understanding and agreement with the plan.     RTC 6 months    Copy sent to:  Dr. Sukumar Alas              Sincerely,      Marcos Love MD

## 2021-12-06 NOTE — PROGRESS NOTES
Chief Complaint   Patient presents with    Hypogonadism     EMAIL   [email or web address]            **THIS IS A VIRTUAL VISIT VIA A VIDEO ENCOUNTER. PATIENT AGREED TO HAVE THEIR CARE DELIVERED OVER VIDEO IN PLACE OF THEIR REGULARLY SCHEDULED OFFICE VISIT**        History of Present Illness: Klaus Lindo is a 32 y.o. 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 He was first started on Testosterone in November in 2017, I was doing sub-Q injections. In January 2020 I switched over to gel. \"  At our initial visit in June 2021 he was taking the generic Testosterone 1.62%, he takes two pumps every day. Pt was followed by Dr. Kavita Dee of psychiatry for major depression, panic disorder and female to male transgender, but she has retired and he is not seeing Dr. Brianna Marks. He has received both COVID vaccinations ([facility]itt[facility]). Pt denies any recent illnesses, injuries or hospitalizations. Pt is \"about half-way through\" his Master's degree. His study is \"[employer] studies\". Pt is still taking the the generic Testosterone 1.62%, he takes two pumps every day. Pt feels that with the two pumps per day of the Testosterone he is doing well clinically. He notes that he was developing scar tissue with the Sub-Q injections. He denies issues of CP, SOB, palpitations, mood swings, increased aggression. He denies issues of HA, vision changes. He denies issues of breast pain or gynecomastia. He denies issue of rash or skin breakdown at the application sites. Current Outpatient Medications   Medication Sig    testosterone (ANDROGEL) 20.25 mg/1.25 gram (1.62 %) gel 2 pumps daily (one pump per shoulder)    desvenlafaxine succinate (PRISTIQ) 50 mg ER tablet Take 1 Tab by mouth daily.  fexofenadine (ALLEGRA) 180 mg tablet Take 1 Tab by mouth daily.  propranoloL (INDERAL) 20 mg tablet Take 1 Tab by mouth two (2) times daily as needed for Other (anxiety).  Indications: take 1/2 hour before the event     No current facility-administered medications for this visit. Allergies   Allergen Reactions    Latex Hives     Review of Systems:  - Cardiovascular: no chest pain  - Neurological: no tremors  - Integumentary: skin is normal    Physical Examination:  There were no vitals taken for this visit. - GENERAL: NCAT, Appears well nourished   - EYES: EOMI, non-icteric, no proptosis   - Ear/Nose/Throat: NCAT, no visible inflammation or masses   - CARDIOVASCULAR: no cyanosis, no visible JVD   - RESPIRATORY: respiratory effort normal without any distress or labored breathing   - MUSCULOSKELETAL: Normal ROM of neck and upper extremities observed   - SKIN: No rash on face   - NEUROLOGIC:  No facial asymmetry (Cranial nerve 7 motor function), No gaze palsy   - PSYCHIATRIC: Normal affect, Normal insight and judgement   -     Data Reviewed:   Component      Latest Ref Rng & Units 11/29/2021   WBC      3.4 - 10.8 x10E3/uL 8.2   RBC      4.14 - 5.80 x10E6/uL 4.79   HGB      13.0 - 17.7 g/dL 15.5   HCT      37.5 - 51.0 % 43.5   MCV      79 - 97 fL 91   MCH      26.6 - 33.0 pg 32.4   MCHC      31.5 - 35.7 g/dL 35.6   RDW      11.6 - 15.4 % 12.0   PLATELET      576 - 686 x10E3/uL 381   Testosterone      264 - 916 ng/dL 737   Free testosterone (Direct)      9.3 - 26.5 pg/mL 38.5 (H)   Estrone, Serum      15 - 65 pg/mL 197 (H)   Estradiol      7.6 - 42.6 pg/mL 40.1   Luteinizing hormone      1.7 - 8.6 mIU/mL 13.9 (H)   FSH      1.5 - 12.4 mIU/mL 22.2 (H)       Assessment/Plan:   1) Female to Male Transgender > Pt is doing well clinically and his Testosterone level was in a good range. His H/H were unremarkable. Pt to continue the Testosterone 1.62% gel, two pumps daily. Pt voices understanding and agreement with the plan.     RTC 6 months    Copy sent to:  Dr. Schwab Asp

## 2021-12-10 ENCOUNTER — TELEPHONE (OUTPATIENT)
Dept: INTERNAL MEDICINE CLINIC | Age: 26
End: 2021-12-10

## 2021-12-10 NOTE — TELEPHONE ENCOUNTER
Pt. States that she is nauseous, light headed, vomiting. I told the patient8 it would be best to go to an urgent care place like [facility]. She agreed.

## 2022-01-01 NOTE — PROGRESS NOTES
CC:   Chief Complaint   Patient presents with    Medication Evaluation       HISTORY OF PRESENT ILLNESS  Leo Robbins is a 25 y.o. female. She complains that sertraline is not helping her depression and anxiety enough. Started on sertraline 50 mg daily on 9/20/17; dose increased to 100 mg on 10/20/17. Initially seemed to be helping at higher dose. Now feels like is not helping much anymore. Has depressed mood, low energy, not feeling motivated, hopelessness, feels like hiding away. Occasional problems falling asleep. Also feels anxious daily. Occasional panic attacks; experiences heart palpitations, hands shaking, and legs bouncing when they occur. Denies SI/HI. She does want to take Xanax much due to habit-forming potential.    Graduated from [employer] last week. Has started working at the [employer]. Likes her job. Other Providers: Wendy Rueda ([facility])      Soc Hx  Single. No children. Never smoker. Denies alcohol or recreational drug use. Is not sexually active. Wants to become transgender.      ROS  A complete review of systems was performed and is negative except for those mentioned in the HPI.        Patient Active Problem List   Diagnosis Code    Obesity (BMI 35.0-39.9 without comorbidity) E66.9    Moderate episode of recurrent major depressive disorder ([address] F33.1    Raynaud's phenomenon I73.00    Allergic rhinitis J30.9    Panic disorder F41.0    Gender dysphoria in adult F61.0     Past Medical History:   Diagnosis Date    Migraines 2007    Mild depression ([facility] [address] 3/10/2015    Obesity 2007    RAD (reactive airway disease) 5598,012    Rhinitis 2005    Allergic & Seasonal     No Known Allergies     Current Outpatient Prescriptions   Medication Sig Dispense Refill    testosterone cypionate (DEPOTESTOTERONE CYPIONATE) 200 mg/mL injection INJECT 0.25 ML INTO THE MUSCLE Q WEEK  0    sertraline (ZOLOFT) 100 mg tablet Take 1 Tab by mouth daily. Indications: ANXIETY WITH DEPRESSION 30 Tab 5    ALPRAZolam (XANAX) 0.5 mg tablet Take 1 Tab by mouth two (2) times daily as needed for Anxiety. Max Daily Amount: 1 mg. Indications: ANXIETY WITH DEPRESSION 30 Tab 0    fexofenadine (ALLEGRA) 180 mg tablet Take 1 Tab by mouth daily. Indications: ALLERGIC RHINITIS 30 Tab 3    Cholecalciferol, Vitamin D3, (VITAMIN D3) 1,000 unit Cap Take  by mouth daily. PHYSICAL EXAM  Visit Vitals    /72 (BP 1 Location: Left arm, BP Patient Position: Sitting)    Pulse 86    Temp 98.4 °F (36.9 °C) (Oral)    Resp 16    Ht 5' 9\" (1.753 m)    Wt 258 lb (117 kg)    LMP 11/19/2017    SpO2 98%    BMI 38.1 kg/m2       General: Obese, no distress. HEENT:  Head normocephalic/atraumatic, no scleral icterus  Neurological: Alert and oriented. Psychiatric: Normal mood and affect. Behavior is normal.     Results for orders placed or performed in visit on 66/53/49   METABOLIC PANEL, COMPREHENSIVE   Result Value Ref Range    Glucose 81 65 - 99 mg/dL    BUN 11 6 - 20 mg/dL    Creatinine 0.75 0.57 - 1.00 mg/dL    GFR est non- >59 mL/min/1.73    GFR est  >59 mL/min/1.73    BUN/Creatinine ratio 15 9 - 23    Sodium 140 134 - 144 mmol/L    Potassium 4.6 3.5 - 5.2 mmol/L    Chloride 100 96 - 106 mmol/L    CO2 24 18 - 29 mmol/L    Calcium 9.8 8.7 - 10.2 mg/dL    Protein, total 7.6 6.0 - 8.5 g/dL    Albumin 4.4 3.5 - 5.5 g/dL    GLOBULIN, TOTAL 3.2 1.5 - 4.5 g/dL    A-G Ratio 1.4 1.2 - 2.2    Bilirubin, total 0.4 0.0 - 1.2 mg/dL    Alk.  phosphatase 100 39 - 117 IU/L    AST (SGOT) 14 0 - 40 IU/L    ALT (SGPT) 18 0 - 32 IU/L   CBC WITH AUTOMATED DIFF   Result Value Ref Range    WBC 8.3 3.4 - 10.8 x10E3/uL    RBC 4.09 3.77 - 5.28 x10E6/uL    HGB 12.5 11.1 - 15.9 g/dL    HCT 37.3 34.0 - 46.6 %    MCV 91 79 - 97 fL    MCH 30.6 26.6 - 33.0 pg    MCHC 33.5 31.5 - 35.7 g/dL    RDW 14.0 12.3 - 15.4 %    PLATELET 031 392 - 665 x10E3/uL    NEUTROPHILS 70 Not Estab. % Hyperbilirubinemia requiring phototherapy Lymphocytes 24 Not Estab. %    MONOCYTES 5 Not Estab. %    EOSINOPHILS 1 Not Estab. %    BASOPHILS 0 Not Estab. %    ABS. NEUTROPHILS 5.8 1.4 - 7.0 x10E3/uL    Abs Lymphocytes 2.0 0.7 - 3.1 x10E3/uL    ABS. MONOCYTES 0.4 0.1 - 0.9 x10E3/uL    ABS. EOSINOPHILS 0.1 0.0 - 0.4 x10E3/uL    ABS. BASOPHILS 0.0 0.0 - 0.2 x10E3/uL    IMMATURE GRANULOCYTES 0 Not Estab. %    ABS. IMM. GRANS. 0.0 0.0 - 0.1 x10E3/uL   HEMOGLOBIN A1C WITH EAG   Result Value Ref Range    Hemoglobin A1c 4.9 4.8 - 5.6 %    Estimated average glucose 94 mg/dL   TSH RFX ON ABNORMAL TO FREE T4   Result Value Ref Range    TSH 1.820 0.450 - 4.500 uIU/mL   LIPID PANEL   Result Value Ref Range    Cholesterol, total 177 100 - 199 mg/dL    Triglyceride 61 0 - 149 mg/dL    HDL Cholesterol 43 >39 mg/dL    VLDL, calculated 12 5 - 40 mg/dL    LDL, calculated 122 (H) 0 - 99 mg/dL   CVD REPORT   Result Value Ref Range    INTERPRETATION Note          ASSESSMENT AND PLAN    ICD-10-CM ICD-9-CM    1. Moderate episode of recurrent major depressive disorder (HCC) F33.1 296.32 buPROPion SR (WELLBUTRIN SR) 150 mg SR tablet      buPROPion SR (WELLBUTRIN SR) 150 mg SR tablet   2. Generalized anxiety disorder F41.1 300.02        Diagnoses and all orders for this visit:    1. Moderate episode of recurrent major depressive disorder (HCC)  Continue sertraline 100 mg daily and add Wellbutrin for depression and anxiety.  -     Start buPROPion SR (WELLBUTRIN SR) 150 mg SR tablet; Take 1 tab by mouth daily for 7 days. Then increase to 1 tab twice daily. Stay on this dose. Indications: ANXIETY WITH DEPRESSION  -     Given prescription to fill after finishing above: buPROPion SR (WELLBUTRIN SR) 150 mg SR tablet; Take 1 Tab by mouth two (2) times a day. Indications: ANXIETY WITH DEPRESSION    2. Generalized anxiety disorder  See plan above in #1.       Follow-up Disposition:  Return in about 2 months (around 2/22/2018), or if symptoms worsen or fail to improve, for Depression and anxiety. Provided patient and/or family with advanced directive information and answered pertinent questions. Encouraged patient to provide a copy of advanced directive to the office when available. I have discussed the diagnosis with the patient and the intended plan as seen in the above orders. Patient is in agreement. The patient has received an after-visit summary and questions were answered concerning future plans. I have discussed medication side effects and warnings with the patient as well.

## 2022-03-19 PROBLEM — E66.01 SEVERE OBESITY WITH BODY MASS INDEX (BMI) OF 35.0 TO 39.9 WITH SERIOUS COMORBIDITY (HCC): Status: ACTIVE | Noted: 2018-08-21

## 2022-03-19 PROBLEM — F43.29 GRIEF REACTION WITH PROLONGED BEREAVEMENT: Status: ACTIVE | Noted: 2020-01-21

## 2022-03-19 PROBLEM — Z78.9 FEMALE-TO-MALE TRANSGENDER PERSON: Status: ACTIVE | Noted: 2018-11-20

## 2022-03-19 PROBLEM — F41.0 PANIC DISORDER: Status: ACTIVE | Noted: 2017-09-26

## 2022-03-19 PROBLEM — F40.00 AGORAPHOBIA, UNSPECIFIED: Status: ACTIVE | Noted: 2018-12-28

## 2022-03-19 PROBLEM — F64.9 GENDER IDENTITY DISORDER: Status: ACTIVE | Noted: 2018-09-06

## 2022-05-25 LAB
ERYTHROCYTE [DISTWIDTH] IN BLOOD BY AUTOMATED COUNT: 11.9 % (ref 11.6–15.4)
FSH SERPL-ACNC: 22.3 MIU/ML (ref 1.5–12.4)
HCT VFR BLD AUTO: 45.6 % (ref 37.5–51)
HGB BLD-MCNC: 14.9 G/DL (ref 13–17.7)
LH SERPL-ACNC: 13.4 MIU/ML (ref 1.7–8.6)
MCH RBC QN AUTO: 30.5 PG (ref 26.6–33)
MCHC RBC AUTO-ENTMCNC: 32.7 G/DL (ref 31.5–35.7)
MCV RBC AUTO: 93 FL (ref 79–97)
PLATELET # BLD AUTO: 344 X10E3/UL (ref 150–450)
RBC # BLD AUTO: 4.88 X10E6/UL (ref 4.14–5.8)
TESTOST FREE SERPL-MCNC: 8.8 PG/ML (ref 9.3–26.5)
TESTOST SERPL-MCNC: 98 NG/DL (ref 264–916)
WBC # BLD AUTO: 8.3 X10E3/UL (ref 3.4–10.8)

## 2022-06-01 DIAGNOSIS — Z78.9 FEMALE-TO-MALE TRANSGENDER PERSON: ICD-10-CM

## 2022-06-08 DIAGNOSIS — Z78.9 FEMALE-TO-MALE TRANSGENDER PERSON: Primary | ICD-10-CM

## 2022-06-08 DIAGNOSIS — Z78.9 FEMALE-TO-MALE TRANSGENDER PERSON: ICD-10-CM

## 2022-06-08 RX ORDER — TESTOSTERONE 20.25 MG/1.25G
GEL TOPICAL
Qty: 3 EACH | Refills: 1 | Status: SHIPPED | OUTPATIENT
Start: 2022-06-08

## 2022-06-08 NOTE — TELEPHONE ENCOUNTER
Patient sent [facility] message stating he had to reschedule his appt til October. Needs refills. Will mail lab orders.

## 2022-07-13 ENCOUNTER — DOCUMENTATION ONLY (OUTPATIENT)
Dept: ENDOCRINOLOGY | Age: 27
End: 2022-07-13

## 2022-08-10 PROBLEM — F64.9 GENDER DYSPHORIA: Status: ACTIVE | Noted: 2022-08-10

## 2022-08-17 ENCOUNTER — DOCUMENTATION ONLY (OUTPATIENT)
Dept: ENDOCRINOLOGY | Age: 27
End: 2022-08-17

## 2022-10-12 LAB
ERYTHROCYTE [DISTWIDTH] IN BLOOD BY AUTOMATED COUNT: 12.2 % (ref 11.6–15.4)
FSH SERPL-ACNC: 23.7 MIU/ML (ref 1.5–12.4)
HCT VFR BLD AUTO: 44 % (ref 37.5–51)
HGB BLD-MCNC: 15 G/DL (ref 13–17.7)
LH SERPL-ACNC: 14.4 MIU/ML (ref 1.7–8.6)
MCH RBC QN AUTO: 30.7 PG (ref 26.6–33)
MCHC RBC AUTO-ENTMCNC: 34.1 G/DL (ref 31.5–35.7)
MCV RBC AUTO: 90 FL (ref 79–97)
PLATELET # BLD AUTO: 391 X10E3/UL (ref 150–450)
RBC # BLD AUTO: 4.88 X10E6/UL (ref 4.14–5.8)
TESTOST FREE SERPL-MCNC: 16.9 PG/ML (ref 9.3–26.5)
TESTOST SERPL-MCNC: 255 NG/DL (ref 264–916)
WBC # BLD AUTO: 9 X10E3/UL (ref 3.4–10.8)

## 2022-10-20 ENCOUNTER — OFFICE VISIT (OUTPATIENT)
Dept: ENDOCRINOLOGY | Age: 27
End: 2022-10-20
Payer: MEDICAID

## 2022-10-20 VITALS
SYSTOLIC BLOOD PRESSURE: 122 MMHG | HEIGHT: 69 IN | BODY MASS INDEX: 43.04 KG/M2 | WEIGHT: 290.6 LBS | DIASTOLIC BLOOD PRESSURE: 75 MMHG | HEART RATE: 75 BPM

## 2022-10-20 DIAGNOSIS — Z78.9 FEMALE-TO-MALE TRANSGENDER PERSON: Primary | ICD-10-CM

## 2022-10-20 PROCEDURE — 99214 OFFICE O/P EST MOD 30 MIN: CPT | Performed by: INTERNAL MEDICINE

## 2022-10-20 NOTE — PROGRESS NOTES
Chief Complaint   Patient presents with    Hormone Problem     Pcp and pharmacy verified     History of Present Illness: Bear Saleem is a 32 y.o. 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 He was first started on Testosterone in November in 2017, I was doing sub-Q injections. In January 2020 I switched over to gel. \"  At our initial visit in June 2021 he was taking the generic Testosterone 1.62%, he takes two pumps every day. Pt was followed by Dr. Saeed Cabrera of psychiatry for major depression, panic disorder and female to male transgender, but she has retired and he is now seeing Melanie Sensor.     Pt denies any recent illnesses, injuries or hospitalizations. Pt is \"about half-way through\" his Master's degree. His study is \"museum studies\". Pt is still taking the the generic Testosterone 1.62%, he takes two pumps every day. Pt feels that with the two pumps per day of the Testosterone he is doing well clinically. He notes that he was developing scar tissue with the Sub-Q injections. He denies issues of CP, SOB, palpitations, mood swings, increased aggression. He denies issues of HA, vision changes. He denies issues of breast pain or gynecomastia. He denies issue of rash or skin breakdown at the application sites. He notes that his beard is growing well. He needs to trim about once per week or more. His LMP was \"2018 and I have had a hysterectomy. \"      Current Outpatient Medications   Medication Sig    testosterone (ANDROGEL) 20.25 mg/1.25 gram (1.62 %) gel 2 pumps daily (one pump per shoulder)    desvenlafaxine succinate (PRISTIQ) 50 mg ER tablet Take 1 Tab by mouth daily. propranoloL (INDERAL) 20 mg tablet Take 1 Tab by mouth two (2) times daily as needed for Other (anxiety). Indications: take 1/2 hour before the event    fexofenadine (ALLEGRA) 180 mg tablet Take 1 Tab by mouth daily. No current facility-administered medications for this visit. Allergies   Allergen Reactions    Latex Hives     Review of Systems:  - Cardiovascular: no chest pain  - Neurological: no tremors  - Integumentary: skin is normal    Physical Examination:  Blood pressure 122/75, pulse 75, height 5' 9\" (1.753 m), weight 290 lb 9.6 oz (131.8 kg). General: pleasant, no distress, good eye contact   Neck: no thyromegaly or thyroid bruits  Cardiovascular: regular, normal rate, nl s1 and s2, no m/r/g   Integumentary: skin is normal, no edema  Neurological: reflexes 2+ at biceps, no tremors  Psychiatric: normal mood and affect    Data Reviewed:   - none new for review    Assessment/Plan:   1) Female to Male Transgender > Pt is doing well clinically  on the Testosterone 1.62% gel, two pumps daily. I will order Testosterone, Estrogen, FSH, LH and CBC and adjust his dose as needed. Pt voices understanding and agreement with the plan.     RTC 6 months    Copy sent to:  Dr. Taty Merrill

## 2022-10-20 NOTE — LETTER
10/20/2022    Patient: Alfredito Garcia   YOB: 1995   Date of Visit: 10/20/2022     Mar Danielson MD  [facility] [address]  [place]    Dear Mar Danielson MD,      Thank you for referring Mr. Talya Carvalho to [address] [place] [address] for evaluation. My notes for this consultation are attached. If you have questions, please do not hesitate to call me. I look forward to following your patient along with you.       Sincerely,    Alison Pierre MD

## 2022-10-23 LAB
ERYTHROCYTE [DISTWIDTH] IN BLOOD BY AUTOMATED COUNT: 11.9 % (ref 11.7–15.4)
ESTROGEN SERPL-MCNC: 122 PG/ML
FSH SERPL-ACNC: 20.9 MIU/ML
HCT VFR BLD AUTO: 45 % (ref 34–46.6)
HGB BLD-MCNC: 15 G/DL (ref 11.1–15.9)
LH SERPL-ACNC: 13 MIU/ML
MCH RBC QN AUTO: 31 PG (ref 26.6–33)
MCHC RBC AUTO-ENTMCNC: 33.3 G/DL (ref 31.5–35.7)
MCV RBC AUTO: 93 FL (ref 79–97)
PLATELET # BLD AUTO: 386 X10E3/UL (ref 150–450)
RBC # BLD AUTO: 4.84 X10E6/UL (ref 3.77–5.28)
TESTOST FREE SERPL-MCNC: 10.6 PG/ML (ref 0–4.2)
TESTOST SERPL-MCNC: 86 NG/DL (ref 13–71)
WBC # BLD AUTO: 8.9 X10E3/UL (ref 3.4–10.8)

## 2022-10-24 ENCOUNTER — PATIENT MESSAGE (OUTPATIENT)
Dept: ENDOCRINOLOGY | Age: 27
End: 2022-10-24

## 2022-12-27 DIAGNOSIS — Z78.9 FEMALE-TO-MALE TRANSGENDER PERSON: ICD-10-CM

## 2022-12-27 RX ORDER — TESTOSTERONE 20.25 MG/1.25G
GEL TOPICAL
Qty: 3 EACH | Refills: 0 | Status: SHIPPED | OUTPATIENT
Start: 2022-12-27

## 2023-08-23 DIAGNOSIS — F64.0 GENDER DYSPHORIA IN ADULT: Primary | ICD-10-CM

## 2023-08-29 LAB
ERYTHROCYTE [DISTWIDTH] IN BLOOD BY AUTOMATED COUNT: 11.8 % (ref 11.6–15.4)
FSH SERPL-ACNC: 25.4 MIU/ML (ref 1.5–12.4)
HCT VFR BLD AUTO: 42.4 % (ref 37.5–51)
HGB BLD-MCNC: 14.8 G/DL (ref 13–17.7)
LH SERPL-ACNC: 19.9 MIU/ML (ref 1.7–8.6)
MCH RBC QN AUTO: 31.8 PG (ref 26.6–33)
MCHC RBC AUTO-ENTMCNC: 34.9 G/DL (ref 31.5–35.7)
MCV RBC AUTO: 91 FL (ref 79–97)
PLATELET # BLD AUTO: 318 X10E3/UL (ref 150–450)
RBC # BLD AUTO: 4.65 X10E6/UL (ref 4.14–5.8)
WBC # BLD AUTO: 8.5 X10E3/UL (ref 3.4–10.8)

## 2023-09-04 LAB
TESTOST FREE SERPL-MCNC: 10.6 PG/ML (ref 9.3–26.5)
TESTOST SERPL-MCNC: 207.7 NG/DL (ref 264–916)

## 2023-09-06 ENCOUNTER — OFFICE VISIT (OUTPATIENT)
Age: 28
End: 2023-09-06
Payer: MEDICAID

## 2023-09-06 VITALS
HEART RATE: 89 BPM | HEIGHT: 69 IN | WEIGHT: 284.2 LBS | BODY MASS INDEX: 42.09 KG/M2 | DIASTOLIC BLOOD PRESSURE: 82 MMHG | SYSTOLIC BLOOD PRESSURE: 116 MMHG

## 2023-09-06 DIAGNOSIS — F64.0 GENDER DYSPHORIA IN ADULT: Primary | ICD-10-CM

## 2023-09-06 PROCEDURE — 99213 OFFICE O/P EST LOW 20 MIN: CPT | Performed by: INTERNAL MEDICINE

## 2023-09-06 RX ORDER — TESTOSTERONE 16.2 MG/G
GEL TRANSDERMAL
Qty: 75 G | Refills: 2 | Status: SHIPPED | OUTPATIENT
Start: 2023-09-06 | End: 2024-09-06

## 2023-09-06 RX ORDER — GUANFACINE 2 MG/1
2 TABLET, EXTENDED RELEASE ORAL DAILY
COMMUNITY
Start: 2023-07-29

## 2023-09-06 NOTE — PROGRESS NOTES
Chief Complaint   Patient presents with    Hormone Issues     Pcp and pharmacy verified     History of Present Illness: Prasanth Jarquin is a 29 y.o. 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 He was first started on Testosterone in November in 2017, I was doing sub-Q injections. In January 2020 I switched over to gel. \"  At our initial visit in June 2021 he was taking the generic Testosterone 1.62%, he takes two pumps every day. Pt was followed by Dr. Aj Morrison of psychiatry for major depression, panic disorder and female to male transgender, but she has retired and he is now seeing Feliberto Cruz.     Pt denies any recent illnesses, injuries or hospitalizations. Pt is \"about half-way through\" his Master's degree. \"I am on a break right now, I am about to start writing my dissertation. His study is \"[employer] studies\". Pt is still taking the the generic Testosterone 1.62%, he takes two pumps every day. Pt feels that with the two pumps per day of the Testosterone he is doing well clinically. He notes that he was developing scar tissue with the Sub-Q injections. He denies issues of CP, SOB, palpitations, mood swings, increased aggression. He denies issues of HA, vision changes. He denies issues of breast pain or gynecomastia. He denies issue of rash or skin breakdown at the application sites. He notes that his beard is growing well. He needs to trim about once per week or more. His LMP was \"2018 and I have had a hysterectomy. \".   Current Outpatient Medications   Medication Sig    guanFACINE (INTUNIV) 2 MG TB24 extended release tablet Take 1 tablet by mouth daily    desvenlafaxine succinate (PRISTIQ) 50 MG TB24 extended release tablet Take by mouth daily    fexofenadine (ALLEGRA) 180 MG tablet Take 1 tablet by mouth daily    propranolol (INDERAL) 20 MG tablet Take by mouth 2 times daily as needed    Testosterone (ANDROGEL) 20.25 MG/ACT (1.62%) GEL gel 2 pumps daily

## 2024-02-29 LAB
ERYTHROCYTE [DISTWIDTH] IN BLOOD BY AUTOMATED COUNT: 12.2 % (ref 11.6–15.4)
HCT VFR BLD AUTO: 41.3 % (ref 37.5–51)
HGB BLD-MCNC: 14.4 G/DL (ref 13–17.7)
MCH RBC QN AUTO: 31.9 PG (ref 26.6–33)
MCHC RBC AUTO-ENTMCNC: 34.9 G/DL (ref 31.5–35.7)
MCV RBC AUTO: 92 FL (ref 79–97)
PLATELET # BLD AUTO: 346 X10E3/UL (ref 150–450)
RBC # BLD AUTO: 4.51 X10E6/UL (ref 4.14–5.8)
WBC # BLD AUTO: 7 X10E3/UL (ref 3.4–10.8)

## 2024-03-01 DIAGNOSIS — F64.0 GENDER DYSPHORIA IN ADULT: ICD-10-CM

## 2024-03-03 LAB
TESTOST FREE SERPL-MCNC: 14.1 PG/ML (ref 9.3–26.5)
TESTOST SERPL-MCNC: 289.1 NG/DL (ref 264–916)

## 2024-03-06 ENCOUNTER — OFFICE VISIT (OUTPATIENT)
Age: 29
End: 2024-03-06
Payer: MEDICAID

## 2024-03-06 VITALS
HEART RATE: 77 BPM | BODY MASS INDEX: 42.86 KG/M2 | WEIGHT: 289.4 LBS | DIASTOLIC BLOOD PRESSURE: 72 MMHG | HEIGHT: 69 IN | SYSTOLIC BLOOD PRESSURE: 109 MMHG

## 2024-03-06 DIAGNOSIS — F64.0 GENDER DYSPHORIA IN ADULT: Primary | ICD-10-CM

## 2024-03-06 PROCEDURE — 99213 OFFICE O/P EST LOW 20 MIN: CPT | Performed by: INTERNAL MEDICINE

## 2024-03-06 PROCEDURE — G2211 COMPLEX E/M VISIT ADD ON: HCPCS | Performed by: INTERNAL MEDICINE

## 2024-03-06 RX ORDER — GUANFACINE 4 MG/1
4 TABLET, EXTENDED RELEASE ORAL DAILY
COMMUNITY
Start: 2024-02-28

## 2024-03-06 NOTE — PROGRESS NOTES
Chief Complaint   Patient presents with    Gender Dysphoria     Pcp and pharmacy verified       History of Present Illness: Taylor Atwood is a 29 y.o. 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  He was first started on Testosterone in November in 2017, I was doing sub-Q injections. In January 2020 I switched over to gel.\"  At our initial visit in June 2021 he was taking the generic Testosterone 1.62%, he takes two pumps every day.    Pt was followed by Dr. Gi Lindsay of psychiatry for major depression, panic disorder and gender dysphoria.    Pt denies any recent illnesses, injuries or hospitalizations.    \"I have been getting some in-grown hairs and because of my OCD I pick a lot. Also when I go through bouts of my depression, I have trouble keeping up with the gel every day. I would like to go back to the injections.\"    Pt is \"about half-way through\" his Master's degree. \"I am on a break right now, I am about to start writing my dissertation. His study is \"museum studies\".    Pt is still taking the the generic Testosterone 1.62%, he takes two pumps every day.    He denies issues of CP, SOB, palpitations, mood swings, increased aggression.  He denies issues of HA, vision changes.  He denies issues of breast pain or gynecomastia.  He notes that his beard is growing well. He needs to trim about once per week or more.    His LMP was \"2018 and I have had a hysterectomy.\".    Current Outpatient Medications   Medication Sig    guanFACINE (INTUNIV) 4 MG TB24 extended release tablet Take 1 tablet by mouth daily    Testosterone (ANDROGEL) 20.25 MG/ACT (1.62%) GEL gel 2 pumps daily (one pump per shoulder)    desvenlafaxine succinate (PRISTIQ) 50 MG TB24 extended release tablet Take by mouth daily    fexofenadine (ALLEGRA) 180 MG tablet Take 1 tablet by mouth daily    propranolol (INDERAL) 20 MG tablet Take by mouth 2 times daily as needed     No current facility-administered medications for

## 2024-03-13 ENCOUNTER — TELEPHONE (OUTPATIENT)
Age: 29
End: 2024-03-13

## 2024-03-13 DIAGNOSIS — F64.0 GENDER DYSPHORIA IN ADULT: Primary | ICD-10-CM

## 2024-03-13 NOTE — TELEPHONE ENCOUNTER
Per Maryann message:  Dina Ruiz,    The pharmacy is holding a prescription for Xyosted, which is a pen. Dr. Gonzales and I discussed starting with that first to see if insurance will cover it then go to the vial if there's prolonged difficulties getting insurance to cover the pen.    Please order Xyosted.

## 2024-03-14 RX ORDER — TESTOSTERONE ENANTHATE 75 MG/.5ML
75 INJECTION SUBCUTANEOUS
Qty: 2 ML | Refills: 3 | Status: SHIPPED | OUTPATIENT
Start: 2024-03-14

## 2024-07-01 DIAGNOSIS — F64.0 GENDER DYSPHORIA IN ADULT: ICD-10-CM

## 2024-07-24 LAB
ERYTHROCYTE [DISTWIDTH] IN BLOOD BY AUTOMATED COUNT: 11.8 % (ref 11.6–15.4)
HCT VFR BLD AUTO: 48.3 % (ref 37.5–51)
HGB BLD-MCNC: 16.1 G/DL (ref 13–17.7)
MCH RBC QN AUTO: 30.5 PG (ref 26.6–33)
MCHC RBC AUTO-ENTMCNC: 33.3 G/DL (ref 31.5–35.7)
MCV RBC AUTO: 92 FL (ref 79–97)
PLATELET # BLD AUTO: 355 X10E3/UL (ref 150–450)
RBC # BLD AUTO: 5.28 X10E6/UL (ref 4.14–5.8)
WBC # BLD AUTO: 8.4 X10E3/UL (ref 3.4–10.8)

## 2024-07-25 LAB
TESTOST FREE SERPL-MCNC: 15.6 PG/ML (ref 9.3–26.5)
TESTOST SERPL-MCNC: 345.8 NG/DL (ref 264–916)

## 2024-07-30 ENCOUNTER — OFFICE VISIT (OUTPATIENT)
Age: 29
End: 2024-07-30
Payer: MEDICAID

## 2024-07-30 VITALS
DIASTOLIC BLOOD PRESSURE: 74 MMHG | SYSTOLIC BLOOD PRESSURE: 122 MMHG | HEART RATE: 83 BPM | BODY MASS INDEX: 43.45 KG/M2 | WEIGHT: 293.4 LBS | HEIGHT: 69 IN

## 2024-07-30 DIAGNOSIS — F64.0 GENDER DYSPHORIA IN ADULT: ICD-10-CM

## 2024-07-30 PROCEDURE — G2211 COMPLEX E/M VISIT ADD ON: HCPCS | Performed by: INTERNAL MEDICINE

## 2024-07-30 PROCEDURE — 99213 OFFICE O/P EST LOW 20 MIN: CPT | Performed by: INTERNAL MEDICINE

## 2024-07-30 RX ORDER — MECLIZINE HYDROCHLORIDE 25 MG/1
25 TABLET ORAL 3 TIMES DAILY PRN
COMMUNITY

## 2024-07-30 RX ORDER — TESTOSTERONE ENANTHATE 75 MG/.5ML
75 INJECTION SUBCUTANEOUS
Qty: 2 ML | Refills: 5 | Status: SHIPPED | OUTPATIENT
Start: 2024-07-30

## 2024-07-30 NOTE — PROGRESS NOTES
Chief Complaint   Patient presents with    Gender Dysphoria     Pcp and pharmacy verified     History of Present Illness: Taylor Atwood is a 29 y.o. 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  He was first started on Testosterone in November in 2017, I was doing sub-Q injections. In January 2020 I switched over to gel.\"  At our initial visit in June 2021 he was taking the generic Testosterone 1.62%, he takes two pumps every day.    Pt is followed by Dr. Gi Lindsay of psychiatry for major depression, panic disorder and gender dysphoria.    Pt denies any recent illnesses, injuries or hospitalizations.    Pt has been using the Xyosted 75mg every Wednesday.   He notes that he is tolerating this well and is not having the issues of  in-grown hairs around the injection site that he was having with the Topical testosterone.     Pt is \"about half-way through\" his Master's degree. \"I am on a break right now, I am about to start writing my dissertation. His study is \"museum studies\".    He denies issues of CP, SOB, palpitations, mood swings, increased aggression.  He denies issues of HA, vision changes.  He denies issues of breast pain or gynecomastia.  He notes that his beard is growing well. He needs to trim about once per week or more.    His LMP was \"2018 and I have had a hysterectomy.\".    Current Outpatient Medications   Medication Sig    meclizine (ANTIVERT) 25 MG tablet Take 1 tablet by mouth 3 times daily as needed    Testosterone Enanthate (XYOSTED) 75 MG/0.5ML SOAJ Inject 75 mg into the skin every 7 days Max Daily Amount: 75 mg    guanFACINE (INTUNIV) 4 MG TB24 extended release tablet Take 1 tablet by mouth daily    desvenlafaxine succinate (PRISTIQ) 50 MG TB24 extended release tablet Take by mouth daily    fexofenadine (ALLEGRA) 180 MG tablet Take 1 tablet by mouth daily    propranolol (INDERAL) 20 MG tablet Take by mouth 2 times daily as needed     No current

## 2025-01-01 DIAGNOSIS — F64.0 GENDER DYSPHORIA IN ADULT: ICD-10-CM

## 2025-01-15 DIAGNOSIS — F64.0 GENDER DYSPHORIA IN ADULT: ICD-10-CM

## 2025-01-15 RX ORDER — TESTOSTERONE ENANTHATE 75 MG/.5ML
75 INJECTION SUBCUTANEOUS
Qty: 2 ML | Refills: 1 | Status: SHIPPED | OUTPATIENT
Start: 2025-01-15

## 2025-01-23 LAB
ERYTHROCYTE [DISTWIDTH] IN BLOOD BY AUTOMATED COUNT: 11.9 % (ref 11.6–15.4)
HCT VFR BLD AUTO: 50 % (ref 37.5–51)
HGB BLD-MCNC: 16.7 G/DL (ref 13–17.7)
MCH RBC QN AUTO: 31.3 PG (ref 26.6–33)
MCHC RBC AUTO-ENTMCNC: 33.4 G/DL (ref 31.5–35.7)
MCV RBC AUTO: 94 FL (ref 79–97)
PLATELET # BLD AUTO: 363 X10E3/UL (ref 150–450)
RBC # BLD AUTO: 5.33 X10E6/UL (ref 4.14–5.8)
WBC # BLD AUTO: 9.3 X10E3/UL (ref 3.4–10.8)

## 2025-01-30 ENCOUNTER — TELEMEDICINE (OUTPATIENT)
Age: 30
End: 2025-01-30
Payer: MEDICAID

## 2025-01-30 DIAGNOSIS — F64.0 GENDER DYSPHORIA IN ADULT: Primary | ICD-10-CM

## 2025-01-30 LAB
TESTOST FREE SERPL-MCNC: 25.8 PG/ML (ref 9.3–26.5)
TESTOST SERPL-MCNC: 503.4 NG/DL (ref 264–916)

## 2025-01-30 PROCEDURE — 99213 OFFICE O/P EST LOW 20 MIN: CPT | Performed by: INTERNAL MEDICINE

## 2025-01-30 PROCEDURE — G2211 COMPLEX E/M VISIT ADD ON: HCPCS | Performed by: INTERNAL MEDICINE

## 2025-01-30 RX ORDER — HYDROXYZINE PAMOATE 25 MG/1
25 CAPSULE ORAL 2 TIMES DAILY PRN
COMMUNITY
Start: 2024-12-30

## 2025-01-30 RX ORDER — QUETIAPINE FUMARATE 50 MG/1
50 TABLET, FILM COATED ORAL
COMMUNITY
Start: 2024-12-30

## 2025-01-30 RX ORDER — OXAZEPAM 10 MG
CAPSULE ORAL NIGHTLY PRN
COMMUNITY
Start: 2025-01-29

## 2025-01-30 RX ORDER — TESTOSTERONE ENANTHATE 75 MG/.5ML
75 INJECTION SUBCUTANEOUS
Qty: 6 ML | Refills: 1 | Status: SHIPPED | OUTPATIENT
Start: 2025-01-30

## 2025-01-30 NOTE — PROGRESS NOTES
Component      Latest Ref Rng 1/23/2025   WBC      3.4 - 10.8 x10E3/uL 9.3    RBC      4.14 - 5.80 x10E6/uL 5.33    Hemoglobin Quant      13.0 - 17.7 g/dL 16.7    Hematocrit      37.5 - 51.0 % 50.0    MCV      79 - 97 fL 94    MCH      26.6 - 33.0 pg 31.3    MCHC      31.5 - 35.7 g/dL 33.4    RDW      11.6 - 15.4 % 11.9    Platelet Count      150 - 450 x10E3/uL 363    Testosterone      264.0 - 916.0 ng/dL 503.4    Testosterone, Free Direct      9.3 - 26.5 pg/mL 25.8      Assessment/Plan:   1) Female to Male Transgender > Pt did not tolerate IM testosterone, or the topical testosterone gel, but is doing well with the Xyosted pen 75mg weekly. His Testosterone last week was 503.4. His CBC was unremarkable.        Pt voices understanding and agreement with the plan.    RTC 6 months    Taylor Atwood, was evaluated through a synchronous (real-time) audio-video encounter. The patient (or guardian if applicable) is aware that this is a billable service, which includes applicable co-pays. This Virtual Visit was conducted with patient's (and/or legal guardian's) consent. Patient identification was verified, and a caregiver was present when appropriate.   The patient was located at Home: [address] [place] [id number]  Provider was located at Facility (Appt Dept): [address] [place] [address] [address]  [place] [address]  Confirm you are appropriately licensed, registered, or certified to deliver care in the state where the patient is located as indicated above. If you are not or unsure, please re-schedule the visit: Yes, I confirm.     --Dionicio Gonzales MD on 1/30/2025 at 11:24 AM    An electronic signature was used to authenticate this note.    Copy sent to:  Dr. Ramirez Felder

## 2025-04-14 ENCOUNTER — TELEPHONE (OUTPATIENT)
Age: 30
End: 2025-04-14

## 2025-07-01 DIAGNOSIS — F64.0 GENDER DYSPHORIA IN ADULT: ICD-10-CM

## 2025-07-24 LAB
ERYTHROCYTE [DISTWIDTH] IN BLOOD BY AUTOMATED COUNT: 11.9 % (ref 11.6–15.4)
HCT VFR BLD AUTO: 50 % (ref 37.5–51)
HGB BLD-MCNC: 17.1 G/DL (ref 13–17.7)
MCH RBC QN AUTO: 32.7 PG (ref 26.6–33)
MCHC RBC AUTO-ENTMCNC: 34.2 G/DL (ref 31.5–35.7)
MCV RBC AUTO: 96 FL (ref 79–97)
PLATELET # BLD AUTO: 366 X10E3/UL (ref 150–450)
RBC # BLD AUTO: 5.23 X10E6/UL (ref 4.14–5.8)
WBC # BLD AUTO: 9.3 X10E3/UL (ref 3.4–10.8)

## 2025-07-26 LAB
TESTOST FREE SERPL-MCNC: 22.2 PG/ML (ref 8.7–25.1)
TESTOST SERPL-MCNC: 364.2 NG/DL (ref 264–916)

## 2025-07-30 ENCOUNTER — OFFICE VISIT (OUTPATIENT)
Age: 30
End: 2025-07-30
Payer: MEDICAID

## 2025-07-30 VITALS
SYSTOLIC BLOOD PRESSURE: 110 MMHG | WEIGHT: 274.4 LBS | HEART RATE: 68 BPM | HEIGHT: 69 IN | BODY MASS INDEX: 40.64 KG/M2 | DIASTOLIC BLOOD PRESSURE: 74 MMHG

## 2025-07-30 DIAGNOSIS — F64.0 GENDER DYSPHORIA IN ADULT: ICD-10-CM

## 2025-07-30 PROCEDURE — G2211 COMPLEX E/M VISIT ADD ON: HCPCS | Performed by: INTERNAL MEDICINE

## 2025-07-30 PROCEDURE — 99213 OFFICE O/P EST LOW 20 MIN: CPT | Performed by: INTERNAL MEDICINE

## 2025-07-30 RX ORDER — TESTOSTERONE ENANTHATE 75 MG/.5ML
75 INJECTION SUBCUTANEOUS
Qty: 6 ML | Refills: 1 | Status: SHIPPED | OUTPATIENT
Start: 2025-07-30

## 2025-07-30 NOTE — PROGRESS NOTES
Chief Complaint   Patient presents with    Gender Dysphoria     Pcp and pharmacy verified     History of Present Illness: Taylor Atwood is a 30 y.o. 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  He was first started on Testosterone in November in 2017, I was doing sub-Q injections. In January 2020 I switched over to gel.\"  At our initial visit in June 2021 he was taking the generic Testosterone 1.62%, he takes two pumps every day.    Pt is followed by Dr. Gi Lindsay of psychiatry for major depression, panic disorder and gender dysphoria.    Pt denies any recent illnesses, injuries or hospitalizations.    Pt has been using the Xyosted 75mg every Wednesday.   He notes that he is tolerating this well and is not having the issues of in-grown hairs around the injection site that he was having with the Topical testosterone, or injection site issues.    Pt is \"about half-way through\" his Master's degree. \"I am on a break right now, I am about to start writing my dissertation. His study is \"museum studies\".    He denies issues of CP, SOB, palpitations, mood swings, increased aggression.  He denies issues of HA, vision changes.  He denies issues of breast pain or gynecomastia.  He notes that his beard is growing well. He needs to trim about once per week or more.    His LMP was \"2018 and I have had a hysterectomy.\".    Current Outpatient Medications   Medication Sig    oxazepam (SERAX) 10 MG capsule nightly as needed.    QUEtiapine (SEROQUEL) 50 MG tablet 1 tablet nightly as needed    hydrOXYzine pamoate (VISTARIL) 25 MG capsule 1 capsule 2 times daily as needed    Testosterone Enanthate (XYOSTED) 75 MG/0.5ML SOAJ Inject 75 mg into the skin every 7 days Max Daily Amount: 75 mg    meclizine (ANTIVERT) 25 MG tablet Take 1 tablet by mouth 3 times daily as needed    guanFACINE (INTUNIV) 4 MG TB24 extended release tablet Take 1 tablet by mouth daily    desvenlafaxine succinate (PRISTIQ) 50 MG TB24

## (undated) DEVICE — Z DISCONTINUED PER MEDLINE PACK PROCEDURE SURG PLAS

## (undated) DEVICE — DRAPE,CHEST,FENES,15X10,STERIL: Brand: MEDLINE

## (undated) DEVICE — NEEDLE HYPO 22GA L1.5IN BLK S STL HUB POLYPR SHLD REG BVL

## (undated) DEVICE — TRAY PREP DRY W/ PREM GLV 2 APPL 6 SPNG 2 UNDPD 1 OVERWRAP

## (undated) DEVICE — STERILE POLYISOPRENE POWDER-FREE SURGICAL GLOVES: Brand: PROTEXIS

## (undated) DEVICE — Device

## (undated) DEVICE — NDL CTR 10/20 DBL MAGS PK: Brand: CARDINAL HEALTH

## (undated) DEVICE — APPLICATOR BNDG 1MM ADH PREMIERPRO EXOFIN

## (undated) DEVICE — SURGICAL PROCEDURE PACK BASIN MAJ SET CUST NO CAUT

## (undated) DEVICE — SUTURE MCRYL SZ 3-0 L27IN ABSRB UD L19MM PS-2 3/8 CIR PRIM Y427H

## (undated) DEVICE — DRSG PATCH ANTIMIC 1INX4.0MM -- CONVERT TO ITEM 356053

## (undated) DEVICE — 3M™ TEGADERM™ TRANSPARENT FILM DRESSING FRAME STYLE, 1626W, 4 IN X 4-3/4 IN (10 CM X 12 CM), 50/CT 4CT/CASE: Brand: 3M™ TEGADERM™

## (undated) DEVICE — SYR 10ML LUER LOK 1/5ML GRAD --

## (undated) DEVICE — BULB SYRINGE, IRRIGATION WITH PROTECTIVE CAP, 60 CC, INDIVIDUALLY WRAPPED: Brand: DOVER

## (undated) DEVICE — SOLUTION LACTATED RINGERS INJECTION USP

## (undated) DEVICE — TUBING SUCT L9FT FOR AUTOFUSE INFLTR SYS

## (undated) DEVICE — LIGHT HANDLE: Brand: DEVON

## (undated) DEVICE — PICO SINGLE USE NEGATIVE PRESSURE WOUND THERAPY SYSTEM 15CM X 15CM  6IN. X 6IN.: Brand: PICO

## (undated) DEVICE — OCCLUSIVE GAUZE STRIP,3% BISMUTH TRIBROMOPHENATE IN PETROLATUM BLEND: Brand: XEROFORM

## (undated) DEVICE — ELECTRODE NDL 2.8IN COAT VALLEYLAB

## (undated) DEVICE — SKIN MARKER,REGULAR TIP WITH RULER AND LABELS: Brand: DEVON

## (undated) DEVICE — INTENDED FOR TISSUE SEPARATION, AND OTHER PROCEDURES THAT REQUIRE A SHARP SURGICAL BLADE TO PUNCTURE OR CUT.: Brand: BARD-PARKER ® CARBON RIB-BACK BLADES

## (undated) DEVICE — REM POLYHESIVE ADULT PATIENT RETURN ELECTRODE: Brand: VALLEYLAB

## (undated) DEVICE — SUT ETHLN 3-0 18IN PS2 BLK --

## (undated) DEVICE — SUTURE VCRL SZ 0 L27IN ABSRB UD SH L26MM 1/2 CIR TAPERPOINT J418H

## (undated) DEVICE — Z DISCONTINUEDSOLUTION PREP 2OZ 10% POVIDONE IOD SCR CAP BTL

## (undated) DEVICE — PLASMABLADE PS210-030S 3.0S LOCK: Brand: PLASMABLADE™

## (undated) DEVICE — DRAPE,REIN 53X77,STERILE: Brand: MEDLINE

## (undated) DEVICE — STERILE POLYISOPRENE POWDER-FREE SURGICAL GLOVES WITH EMOLLIENT COATING: Brand: PROTEXIS

## (undated) DEVICE — Z CONVERTED USE 2271116 TUBING ASPIR 9 FT STRL ULTRA-LIMP

## (undated) DEVICE — SUTURE MCRYL SZ 2-0 L27IN ABSRB UD SH L26MM TAPERPOINT NDL Y417H

## (undated) DEVICE — CURITY NON-ADHERENT STRIPS: Brand: CURITY

## (undated) DEVICE — INFECTION CONTROL KIT SYS

## (undated) DEVICE — CONTAINER,SPECIMEN,3OZ,OR STRL: Brand: MEDLINE

## (undated) DEVICE — STAPLER SKIN H3.9MM WIRE DIA0.58MM CRWN 6.9MM 35 STPL ROT

## (undated) DEVICE — TRAY CATH SIL 16FR 10 CA STATLOK

## (undated) DEVICE — SOLUTION IV 1000ML 0.9% SOD CHL

## (undated) DEVICE — SUT PROL 0 30IN CT1 BLU --

## (undated) DEVICE — 3M™ BAIR HUGGER® UNDERBODY BLANKET, FULL ACCESS, 10 PER CASE 63500: Brand: BAIR HUGGER™

## (undated) DEVICE — DEVON™ KNEE AND BODY STRAP 60" X 3" (1.5 M X 7.6 CM): Brand: DEVON

## (undated) DEVICE — Z DISCONTINUED USE (MFG CAT 7984-37) SOLUTION IV SODIUM CHL 0.9% 100 ML INJ

## (undated) DEVICE — KENDALL SCD EXPRESS SLEEVES, KNEE LENGTH, MEDIUM: Brand: KENDALL SCD

## (undated) DEVICE — MEDI-VAC NON-CONDUCTIVE SUCTION TUBING: Brand: CARDINAL HEALTH

## (undated) DEVICE — DRAPE FLD WRM W44XL66IN C6L FOR INTRATEMP SYS THERMABASIN

## (undated) DEVICE — BANDAGE COMPR SELF ADH 5 YDX4 IN TAN STRL PREMIERPRO LF

## (undated) DEVICE — STAPLER SKIN 35CT WD STRL DISP -- MULTIFIRE PREMIUM

## (undated) DEVICE — 3M™ MEDIPORE™ H SOFT CLOTH TAPE SHORT ROLL TAPE 6INCHES X 2 YARDS 16 ROLLS/CASE 2866S: Brand: 3M™ MEDIPORE™

## (undated) DEVICE — TOWEL SURG W17XL27IN STD BLU COT NONFENESTRATED PREWASHED

## (undated) DEVICE — GAUZE SPONGES,12 PLY: Brand: CURITY

## (undated) DEVICE — 3000CC GUARDIAN II: Brand: GUARDIAN

## (undated) DEVICE — SPONGE LAP 18X18IN STRL -- 5/PK

## (undated) DEVICE — DRAIN SURG 19FR L025IN DIA63MM SIL CHN RND FULL FLUT CLS